# Patient Record
Sex: FEMALE | Race: BLACK OR AFRICAN AMERICAN | NOT HISPANIC OR LATINO | Employment: FULL TIME | URBAN - METROPOLITAN AREA
[De-identification: names, ages, dates, MRNs, and addresses within clinical notes are randomized per-mention and may not be internally consistent; named-entity substitution may affect disease eponyms.]

---

## 2018-01-25 ENCOUNTER — HOSPITAL ENCOUNTER (EMERGENCY)
Facility: HOSPITAL | Age: 38
Discharge: HOME | End: 2018-01-25
Payer: MEDICAID

## 2018-01-25 VITALS
WEIGHT: 280 LBS | OXYGEN SATURATION: 97 % | HEART RATE: 78 BPM | DIASTOLIC BLOOD PRESSURE: 96 MMHG | BODY MASS INDEX: 41.35 KG/M2 | SYSTOLIC BLOOD PRESSURE: 146 MMHG | RESPIRATION RATE: 20 BRPM | TEMPERATURE: 99 F

## 2018-01-25 DIAGNOSIS — M54.9 BACK PAIN: ICD-10-CM

## 2018-01-25 DIAGNOSIS — M54.31 SCIATICA OF RIGHT SIDE: Primary | ICD-10-CM

## 2018-01-25 PROCEDURE — 96372 THER/PROPH/DIAG INJ SC/IM: CPT

## 2018-01-25 PROCEDURE — 63600175 PHARM REV CODE 636 W HCPCS: Performed by: PHYSICIAN ASSISTANT

## 2018-01-25 PROCEDURE — 99284 EMERGENCY DEPT VISIT MOD MDM: CPT | Mod: 25

## 2018-01-25 RX ORDER — METHYLPREDNISOLONE 4 MG/1
TABLET ORAL
Qty: 1 PACKAGE | Refills: 0 | Status: SHIPPED | OUTPATIENT
Start: 2018-01-25 | End: 2018-02-27 | Stop reason: ALTCHOICE

## 2018-01-25 RX ORDER — HYDROCODONE BITARTRATE AND ACETAMINOPHEN 10; 325 MG/1; MG/1
1 TABLET ORAL EVERY 4 HOURS PRN
Qty: 12 TABLET | Refills: 0 | Status: SHIPPED | OUTPATIENT
Start: 2018-01-25 | End: 2018-02-04

## 2018-01-25 RX ORDER — DEXAMETHASONE SODIUM PHOSPHATE 4 MG/ML
8 INJECTION, SOLUTION INTRA-ARTICULAR; INTRALESIONAL; INTRAMUSCULAR; INTRAVENOUS; SOFT TISSUE
Status: COMPLETED | OUTPATIENT
Start: 2018-01-25 | End: 2018-01-25

## 2018-01-25 RX ORDER — METHOCARBAMOL 500 MG/1
1000 TABLET, FILM COATED ORAL 3 TIMES DAILY
Qty: 30 TABLET | Refills: 0 | Status: SHIPPED | OUTPATIENT
Start: 2018-01-25 | End: 2018-01-30

## 2018-01-25 RX ADMIN — DEXAMETHASONE SODIUM PHOSPHATE 8 MG: 4 INJECTION, SOLUTION INTRAMUSCULAR; INTRAVENOUS at 02:01

## 2018-01-25 NOTE — ED PROVIDER NOTES
Encounter Date: 2018       History     Chief Complaint   Patient presents with    Back Pain     lower back pain radiating down right leg after slip and fall last week     The history is provided by the patient.   Back Pain    This is a new problem. The current episode started several days ago. The problem occurs daily. The problem has been unchanged. The pain is associated with falling. The pain is present in the lumbar spine. The quality of the pain is described as shooting. The pain radiates to the right leg. The symptoms are aggravated by twisting, certain positions and bending. Associated symptoms include leg pain. Pertinent negatives include no chest pain, no fever, no numbness, no weight loss, no headaches, no abdominal pain, no abdominal swelling, no bowel incontinence, no perianal numbness, no bladder incontinence, no dysuria, no pelvic pain, no paresthesias, no paresis, no tingling and no weakness. She has tried nothing for the symptoms. The treatment provided no relief.     Review of patient's allergies indicates:  No Known Allergies  Past Medical History:   Diagnosis Date    Cholelithiasis      Past Surgical History:   Procedure Laterality Date     SECTION      COLPOSCOPY       Family History   Problem Relation Age of Onset    Diabetes Mother     Hypertension Mother      Social History   Substance Use Topics    Smoking status: Current Every Day Smoker    Smokeless tobacco: Not on file    Alcohol use 0.0 oz/week     Review of Systems   Constitutional: Negative for chills, fever and weight loss.   HENT: Negative for sore throat.    Eyes: Negative for photophobia and redness.   Respiratory: Negative for cough and shortness of breath.    Cardiovascular: Negative for chest pain.   Gastrointestinal: Negative for abdominal pain, bowel incontinence, diarrhea and nausea.   Endocrine: Negative for polydipsia and polyphagia.   Genitourinary: Negative for bladder incontinence, dysuria and pelvic  pain.   Musculoskeletal: Positive for back pain. Negative for arthralgias and myalgias.   Skin: Negative for rash.   Neurological: Negative for tingling, weakness, numbness, headaches and paresthesias.   Hematological: Does not bruise/bleed easily.   Psychiatric/Behavioral: The patient is not nervous/anxious.    All other systems reviewed and are negative.      Physical Exam     Initial Vitals [01/25/18 1407]   BP Pulse Resp Temp SpO2   (!) 179/95 70 18 98.5 °F (36.9 °C) 97 %      MAP       123         Physical Exam    Nursing note and vitals reviewed.  Constitutional: Vital signs are normal. She appears well-developed and well-nourished. No distress.   HENT:   Head: Normocephalic and atraumatic.   Right Ear: External ear normal.   Left Ear: External ear normal.   Nose: Nose normal.   Mouth/Throat: Oropharynx is clear and moist.   Eyes: Conjunctivae, EOM and lids are normal. Pupils are equal, round, and reactive to light.   Neck: Normal range of motion and full passive range of motion without pain. Neck supple.   Cardiovascular: Normal rate, regular rhythm, S1 normal, S2 normal, normal heart sounds, intact distal pulses and normal pulses.   Pulmonary/Chest: Breath sounds normal. No respiratory distress. She has no wheezes. She has no rales.   Abdominal: Soft. Normal appearance and bowel sounds are normal. She exhibits no distension. There is no tenderness.   Musculoskeletal:        Lumbar back: She exhibits decreased range of motion, tenderness, pain and spasm. She exhibits no bony tenderness, no swelling, no edema, no deformity, no laceration and normal pulse.        Back:    Lymphadenopathy:     She has no cervical adenopathy.   Neurological: She is alert and oriented to person, place, and time. She has normal strength. No cranial nerve deficit or sensory deficit. Coordination and gait normal.   Skin: Skin is warm, dry and intact.   Psychiatric: She has a normal mood and affect. Her speech is normal and behavior  is normal. Judgment and thought content normal. Cognition and memory are normal.         ED Course   Procedures  Labs Reviewed - No data to display                            ED Course      Clinical Impression:   The primary encounter diagnosis was Sciatica of right side. A diagnosis of Back pain was also pertinent to this visit.    Disposition:   Disposition: Discharged  Condition: Stable                        FRANNIE Farias  01/25/18 1717

## 2018-02-05 ENCOUNTER — PATIENT MESSAGE (OUTPATIENT)
Dept: INTERNAL MEDICINE | Facility: CLINIC | Age: 38
End: 2018-02-05

## 2018-02-05 RX ORDER — IBUPROFEN AND FAMOTIDINE 26.6; 8 MG/1; MG/1
2 TABLET ORAL 3 TIMES DAILY
COMMUNITY
End: 2018-02-27 | Stop reason: ALTCHOICE

## 2018-02-06 ENCOUNTER — OFFICE VISIT (OUTPATIENT)
Dept: INTERNAL MEDICINE | Facility: CLINIC | Age: 38
End: 2018-02-06
Payer: MEDICAID

## 2018-02-06 VITALS
OXYGEN SATURATION: 98 % | BODY MASS INDEX: 41.35 KG/M2 | WEIGHT: 280 LBS | SYSTOLIC BLOOD PRESSURE: 140 MMHG | TEMPERATURE: 97 F | HEART RATE: 112 BPM | DIASTOLIC BLOOD PRESSURE: 90 MMHG

## 2018-02-06 DIAGNOSIS — M54.42 ACUTE BILATERAL LOW BACK PAIN WITH LEFT-SIDED SCIATICA: Primary | ICD-10-CM

## 2018-02-06 PROCEDURE — 99999 PR PBB SHADOW E&M-EST. PATIENT-LVL III: CPT | Mod: PBBFAC,,, | Performed by: NURSE PRACTITIONER

## 2018-02-06 PROCEDURE — 99214 OFFICE O/P EST MOD 30 MIN: CPT | Mod: S$PBB,,, | Performed by: NURSE PRACTITIONER

## 2018-02-06 PROCEDURE — 99213 OFFICE O/P EST LOW 20 MIN: CPT | Mod: PBBFAC | Performed by: NURSE PRACTITIONER

## 2018-02-06 PROCEDURE — 3008F BODY MASS INDEX DOCD: CPT | Mod: ,,, | Performed by: NURSE PRACTITIONER

## 2018-02-07 NOTE — PROGRESS NOTES
Subjective:       Patient ID: Karen Rudolph is a 37 y.o. female.    Chief Complaint: Back Pain    Patient presents with back pain that began after she slipped on ice several weeks ago. She has been treated at both [facility] and [facility] for sciatic back pain. She states imaging has been done at both ERs and were negative for acute process.       Back Pain   This is a recurrent problem. The current episode started 1 to 4 weeks ago. The problem occurs constantly. The problem has been waxing and waning since onset. The pain is present in the gluteal, lumbar spine and sacro-iliac. The quality of the pain is described as aching and cramping. The pain radiates to the left foot, left knee and left thigh. The pain is at a severity of 7/10. The pain is moderate. The pain is the same all the time. The symptoms are aggravated by bending, position, lying down, sitting, standing and twisting. Stiffness is present all day. Associated symptoms include leg pain, numbness, tingling and weakness. Pertinent negatives include no abdominal pain, bladder incontinence, bowel incontinence, chest pain, dysuria, fever, headaches, paresis, paresthesias, pelvic pain, perianal numbness or weight loss. Risk factors include obesity and recent trauma. She has tried analgesics, NSAIDs, bed rest, heat, home exercises, muscle relaxant and walking for the symptoms. The treatment provided no relief.     Review of Systems   Constitutional: Negative for chills, diaphoresis, fever, unexpected weight change and weight loss.   Cardiovascular: Negative for chest pain.   Gastrointestinal: Negative for abdominal pain and bowel incontinence.   Genitourinary: Negative for bladder incontinence, dysuria, hematuria and pelvic pain.        Denies urinary incontinence   Musculoskeletal: Positive for back pain.   Neurological: Positive for tingling, weakness and numbness. Negative for headaches and paresthesias.       Objective:      Physical Exam   Constitutional:  She is oriented to person, place, and time. She appears well-developed and well-nourished.   Cardiovascular: Normal rate and regular rhythm.  Exam reveals no friction rub.    No murmur heard.  Pulmonary/Chest: Effort normal and breath sounds normal.   Musculoskeletal:   Lumbar paraspinous muscles tender to palpation.   Negative straight leg bilaterally. Patient unable to lay flat and straighten left leg.    Neurological: She is alert and oriented to person, place, and time.   Psychiatric: She has a normal mood and affect.   Vitals reviewed.      Assessment:       1. Acute bilateral low back pain with left-sided sciatica        Plan:   Acute bilateral low back pain with left-sided sciatica  -     Ambulatory Referral to Physical/Occupational Therapy      Will start with PT and consider further imaging (MRI) if no improvement. Patient wishes to see ortho per last ER recommendation and will contact me with who she wishes to see after she contacts her insurance.   Follow-up in about 2 weeks (around 2/20/2018), or if symptoms worsen or fail to improve, for blood pressure with Dr. Ng.      Answers for HPI/ROS submitted by the patient on 2/5/2018   Back pain  genital pain: No

## 2018-02-13 ENCOUNTER — PATIENT OUTREACH (OUTPATIENT)
Dept: ADMINISTRATIVE | Facility: HOSPITAL | Age: 38
End: 2018-02-13

## 2018-02-27 ENCOUNTER — OFFICE VISIT (OUTPATIENT)
Dept: INTERNAL MEDICINE | Facility: CLINIC | Age: 38
End: 2018-02-27
Payer: MEDICAID

## 2018-02-27 VITALS
HEART RATE: 85 BPM | BODY MASS INDEX: 41.86 KG/M2 | DIASTOLIC BLOOD PRESSURE: 88 MMHG | WEIGHT: 282.63 LBS | SYSTOLIC BLOOD PRESSURE: 138 MMHG | OXYGEN SATURATION: 99 % | RESPIRATION RATE: 18 BRPM | HEIGHT: 69 IN | TEMPERATURE: 98 F

## 2018-02-27 DIAGNOSIS — M79.605 LEFT LEG PAIN: ICD-10-CM

## 2018-02-27 DIAGNOSIS — E66.01 MORBID OBESITY WITH BMI OF 40.0-44.9, ADULT: ICD-10-CM

## 2018-02-27 DIAGNOSIS — R03.0 BORDERLINE HIGH BLOOD PRESSURE: Primary | ICD-10-CM

## 2018-02-27 PROCEDURE — 99213 OFFICE O/P EST LOW 20 MIN: CPT | Mod: S$PBB,,, | Performed by: INTERNAL MEDICINE

## 2018-02-27 PROCEDURE — 99999 PR PBB SHADOW E&M-EST. PATIENT-LVL III: CPT | Mod: PBBFAC,,, | Performed by: INTERNAL MEDICINE

## 2018-02-27 PROCEDURE — 99213 OFFICE O/P EST LOW 20 MIN: CPT | Mod: PBBFAC | Performed by: INTERNAL MEDICINE

## 2018-02-27 NOTE — PATIENT INSTRUCTIONS
Weight Management: Healthy Eating  Food is your bodys fuel. You cant live without it. The key is to give your body enough nutrients and energy without eating too much. Reading food labels can help you make healthy choices. Also, learn new eating habits to manage your weight.     All the values on the label are based on one serving. The serving size is the average portion. Remember to multiply the values on the label by the number of servings you eat.   Eat less fat  A gram of fat has almost 2.5 times the calories of a gram of protein or carbohydrates. Try to balance your food choices so that only 20% to 35% of your calories comes from total fat. This means an average of 2½ to 3½ grams of fat for each 100 calories you eat.  Eat more fiber  High-fiber foods are digested more slowly than low-fiber foods, so you feel full longer. Try to get at least 25 grams of fiber each day for a 2000 calorie diet. Foods high in fiber include:  · Vegetables and fruits  · Whole-grain or bran breads, pastas, and cereals  · Legumes (beans) and peas  As you begin to eat more fiber, be sure to drink plenty of water to keep your digestive system working smoothly.  Tips  Do's and don'ts include:   · Dont skip meals. This often leads to overeating later on. Its best to spread your eating throughout the day.  · Eat a variety of foods, not just a few favorites.  · If you find yourself eating when youre not hungry, ask yourself why. Many of us eat when were bored, stressed, or just to be polite. Listen to your body. If youre not hungry, get busy doing something else instead of eating.  · Eat slower, shooting for 20 to 30 minutes for each meal. It takes 20 minutes for your stomach to tell your brain that its full. Slow eaters tend to eat less and are still satisfied, while fast eaters may tend to be overeaters.   · Pay attention to what you eat. Dont read or watch TV during your meal.  Date Last Reviewed: 1/31/2016  © 4395-5093 [employer]  [employer]. [address] [place] [address], [place] [address]. All rights reserved. This information is not intended as a substitute for professional medical care. Always follow your healthcare professional's instructions.

## 2018-02-28 NOTE — PROGRESS NOTES
Subjective:       Patient ID: Karen Rudolph is a 37 y.o. female.    Chief Complaint: Follow-up    Karen Rudolph  37 y.o.  Black or  female    Patient presents with:  Follow-up    HPI:   Presents to the clinic for two week f/u. She was seen by Tania Clark NP 2 weeks ago for acute back pain and was found to have elevated b/p. She denies a history of HTN. Her b/p is slightly elevated today.   She fell at the end of July. She landed on her buttock. She initially had a lot of numbness and tingling and back pain.   Her back pain has improved but she continues to have pain in her left leg. The pain is worse with standing. She has been placed on leave from work. She will start PT soon.     PMH: Reviewed    MEDS: Reviewed med card    ALLERGIES: Reviewed allergy card        Review of Systems   Constitutional: Negative for activity change and unexpected weight change.   HENT: Negative for hearing loss, rhinorrhea and trouble swallowing.    Eyes: Negative for discharge and visual disturbance.   Respiratory: Negative for chest tightness and wheezing.    Cardiovascular: Negative for chest pain and palpitations.   Gastrointestinal: Negative for blood in stool, constipation, diarrhea and vomiting.   Endocrine: Negative for polydipsia and polyuria.   Genitourinary: Negative for difficulty urinating, dysuria, hematuria and menstrual problem.   Musculoskeletal: Positive for arthralgias. Negative for joint swelling and neck pain.   Neurological: Negative for weakness and headaches.   Psychiatric/Behavioral: Negative for confusion and dysphoric mood.       Objective:      Physical Exam   Constitutional: She is oriented to person, place, and time. She appears well-developed and well-nourished. No distress.   Eyes: No scleral icterus.   Cardiovascular: Normal rate and regular rhythm.    Pulmonary/Chest: Effort normal and breath sounds normal. No respiratory distress.   Abdominal: Soft. Bowel sounds are normal.    Musculoskeletal:        Lumbar back: She exhibits no tenderness, no bony tenderness, no swelling, no deformity and no pain.   Neurological: She is alert and oriented to person, place, and time. She has normal strength. No sensory deficit. She exhibits normal muscle tone. Gait normal.   Skin: Skin is warm and dry.   Psychiatric: She has a normal mood and affect.   Vitals reviewed.      Assessment:       1. Borderline high blood pressure    2. Left leg pain    3. Morbid obesity with BMI of 40.0-44.9, adult        Plan:       Karen was seen today for follow-up.    Diagnoses and all orders for this visit:    Borderline high blood pressure  -     Lifestyle modifications discussed    Left leg pain  -     Agree with PT and time off from work    Morbid obesity with BMI of 40.0-44.9, adult  -     Lifestyle modifications discussed    F/U in 4 weeks for b/p and leg pain.     [place] paperwork will be completed.

## 2018-03-05 ENCOUNTER — CLINICAL SUPPORT (OUTPATIENT)
Dept: REHABILITATION | Facility: HOSPITAL | Age: 38
End: 2018-03-05
Attending: NURSE PRACTITIONER
Payer: MEDICAID

## 2018-03-05 DIAGNOSIS — M54.42 ACUTE BACK PAIN WITH SCIATICA, LEFT: Primary | ICD-10-CM

## 2018-03-05 PROCEDURE — 97161 PT EVAL LOW COMPLEX 20 MIN: CPT | Mod: PO | Performed by: PHYSICAL THERAPIST

## 2018-03-05 NOTE — PROGRESS NOTES
DATE OF INITIAL PHYSICAL THERAPY EVALUATION:             2018      REFERRING PROVIDER:       Tania Clark NP      REFERRING DIAGNOSIS:     Acute bilateral low back pain with left-sided sciatica [M54.42]      ORDERS:   Evaluate and treat as indicated    VISIT    #1       SUBJECTIVE:      Patients primary complaint(s):  Left LE pain  Lumbar stiffness  Impaired ADL's    History of present condition:    Patient reports she was injured in 2018 when she slipped on ice on her driveway and fell landing on her buttocks.  Initially she began to experience lumbar pain with numbness and tingling in the left LE.  Her lumbar pain has lessened and now she reports experiencing mostly lumbar stiffness; however, she continues to experience significant pain in the left LE.  She reports the pain is located throughout the lateral aspect of the left lower leg from just below the knee to her ankle.  She also complains of tenderness along the distribution of the left IT Band.  Patient reports her leg pain is constant and is aggravated by standing, bending, and lifting.  Pain increases at night and when first attempting to move about after sitting for an extended period of time.  Pain eases slightly after moving about for a few minutes; however, extended activity increases symptoms.      Pain Ratin/10      Patient reports the following functional activity limitations:  Most household chores limited; example, laundry, making the bed (bending and lifting impaired)  Prolonged standing and walking impaired  Unable to work due as a     (FUNCTIONAL ASSESSMENT TOOL)    MODIFIED OSWESTRY LOW BACK PAIN DISABILITY QUESTIONNAIRE  Complete during initial evaluation on 2018  The following scores are patient-reported and range from 0-5, with 0 being least impaired and 5 being most impaired.      Section 1- Pain intensity    Score 3/5   Section 2- Person care  Score 3/5   Section 3 Lifting- Optional  Score  3/5     Section 4  Walking  Score 1/5  Section 5 Sitting   Score 1/5   Section 6 Standing  Score 3/5  Section 7 Sleeping  Score 5/5   Section 8 Social Life   Score 4/5  Section 9 Traveling  Score 1/5   Section 10 Employ/home  Score 3/5    Patient reports 54% disability on the Modified Oswestry Low Back Pain Disability Questionnaire.    Past Medical History and co-morbidities:  Past Medical History:   Diagnosis Date    Cholelithiasis      Patient Active Problem List   Diagnosis    Morbid obesity with BMI of 40.0-44.9, adult     No current outpatient prescriptions on file.      Previous physical therapy and treatments:  Patient has not participated in a physical therapy program to date for current referring diagnoses.    Occupational/psychosocial/educational profile:  Not employed at this time as a ; unable to work due to leg pain    OBJECTIVE:    Musculoskeletal Exam:    Postural Exam  No significant postural deviations noted.  SI exam negative for ilial rotation.    ROM  Lumbar Spine:  Flexion:   50% of full expected motion; end point reported to be stiff in the lumbar region, increases left LE pain.     Extension   25% of full expected motion; end point reported to be stiff in the lumbar region; increases left LE pain.   Right side bending  75% of full expected motion   Left side bending  75% of full expected motion      Flexibility  Limited flexibility noted in the HS, IT Band and piriformis bilaterally.    Functional Strength Testing  No strength deficits or imbalances noted throughout the LE or hip girdle muscle groups.    Palpation  Tender along the left IT Band  Tender over left trochanteric bursa  Non tender over painful area in left lower leg region  Lumbar paraspinal muscle guarding present.    Neuromuscular Exam    Gait  Antalgic with weight bearing on the left LE    Transitional Movements  Complains of leg pain with sit to stand, sit to supine, supine to sit.    Sensation  No sensory disturbances  noted throughout the LE's  Patient did not complain of paresthesias.  Positive SLR left LE      PROBLEM LIST - ASSESSMENT  Patient presents with limited lumbar ROM and acute left LE pain which is limiting the patient's mobility and functional activities.  She would benefit from a trial of lumbar traction to address radiating left LE pain and muscle stimulation to decrease lumbar paraspinal muscle guarding.  Patient would also benefit from flexibility exercises for the lumbar, hip girdle, and LE muscle groups as well as core strengthening once she becomes less symptomatic.    Activity Limitations, Participation Restrictions, and CO-MORBIDITIES which may impact the plan of care and potentially impede the patient's progress in therapy include:  none    The patient does not present with any learning or communication barriers which may impact the plan of care and potentially impede the patient's progress in therapy.      CLINICAL PRESENTATION:  Patient's Clinical Presentation is STABLE.      RECOMMENDED PLAN OF CARE:  Out-patient physical therapy program 2 x weed for 6 weeks for the following:  Pelvic traction to address radiating left LE pain.  Muscle stimulation to decrease lumbar paraspinal muscle guarding and stiffness  Lumbar flexibility exercises to improve lumbar ROM  Stretching exercises for the hip girdle and LE muscle groups    TREATMENT PROVIDED:       Patient guided through lumbar flexion stretches in supine.  (5 mins with therapist)      PLAN:    Plan of Care forwarded to the insurance carrier for approval.        SHORT TERM GOALS:    1. Patient will report pain rating for left LE 3/10 or less  2. Instruct patient in lumbar flexibility exercises and stretching for the hip girdle and LE muscle groups  3. Patient to report compliance with daily stretching exercises.    LONG TERM GOALS:  1. Resolution of left LE radiating pain  2. Full, pain free ROM of the lumbar spine  3. Patient will report a 25% or greater  decrease in functional disability on the Oswestry functional assessment tool.      These services are reasonable and necessary for the conditions set forth above while under my care.

## 2018-03-09 ENCOUNTER — CLINICAL SUPPORT (OUTPATIENT)
Dept: REHABILITATION | Facility: HOSPITAL | Age: 38
End: 2018-03-09
Attending: NURSE PRACTITIONER
Payer: MEDICAID

## 2018-03-09 DIAGNOSIS — M54.42 ACUTE BACK PAIN WITH SCIATICA, LEFT: Primary | ICD-10-CM

## 2018-03-09 PROCEDURE — 97012 MECHANICAL TRACTION THERAPY: CPT | Mod: PO | Performed by: PHYSICAL THERAPIST

## 2018-03-09 PROCEDURE — 97014 ELECTRIC STIMULATION THERAPY: CPT | Mod: PO | Performed by: PHYSICAL THERAPIST

## 2018-03-09 NOTE — PROGRESS NOTES
DATE OF INITIAL PHYSICAL THERAPY EVALUATION:             2018      REFERRING PROVIDER:       Tania Clark NP      REFERRING DIAGNOSIS:     Acute bilateral low back pain with left-sided sciatica [M54.42]      ORDERS:   Evaluate and treat as indicated    VISIT    #2      SUBJECTIVE:  Patient comes in today complaining of not feeling well due to severe sinus congestion.       She states she has tried to work on the lumbar flexion exercises a few times but is unsure if they were helpful.  She would like to proceed with traction today.    Patients primary complaint(s):  Left LE pain  Lumbar stiffness  Impaired ADL's    History of present condition:  (from initial evaluation)  Patient reports she was injured in 2018 when she slipped on ice on her driveway and fell landing on her buttocks.  Initially she began to experience lumbar pain with numbness and tingling in the left LE.  Her lumbar pain has lessened and now she reports experiencing mostly lumbar stiffness; however, she continues to experience significant pain in the left LE.  She reports the pain is located throughout the lateral aspect of the left lower leg from just below the knee to her ankle.  She also complains of tenderness along the distribution of the left IT Band.  Patient reports her leg pain is constant and is aggravated by standing, bending, and lifting.  Pain increases at night and when first attempting to move about after sitting for an extended period of time.  Pain eases slightly after moving about for a few minutes; however, extended activity increases symptoms.      Pain Ratin/10      Patient reports the following functional activity limitations:  Most household chores limited; example, laundry, making the bed (bending and lifting impaired)  Prolonged standing and walking impaired  Unable to work due as a     (FUNCTIONAL ASSESSMENT TOOL)    MODIFIED OSWESTRY LOW BACK PAIN DISABILITY QUESTIONNAIRE  Complete  during initial evaluation on March 5, 2018  The following scores are patient-reported and range from 0-5, with 0 being least impaired and 5 being most impaired.      Section 1- Pain intensity    Score 3/5   Section 2- Person care  Score 3/5   Section 3 Lifting- Optional  Score 3/5     Section 4  Walking  Score 1/5  Section 5 Sitting   Score 1/5   Section 6 Standing  Score 3/5  Section 7 Sleeping  Score 5/5   Section 8 Social Life   Score 4/5  Section 9 Traveling  Score 1/5   Section 10 Employ/home  Score 3/5    Patient reports 54% disability on the Modified Oswestry Low Back Pain Disability Questionnaire.    Past Medical History and co-morbidities:  Past Medical History:   Diagnosis Date    Cholelithiasis      Patient Active Problem List   Diagnosis    Morbid obesity with BMI of 40.0-44.9, adult     No current outpatient prescriptions on file.      Previous physical therapy and treatments:  Patient has not participated in a physical therapy program to date for current referring diagnoses.    Occupational/psychosocial/educational profile:  Not employed at this time as a ; unable to work due to leg pain    OBJECTIVE:    Musculoskeletal Exam:  (from initial evaluation)    Postural Exam  No significant postural deviations noted.  SI exam negative for ilial rotation.    ROM  Lumbar Spine:  Flexion:   50% of full expected motion; end point reported to be stiff in the lumbar region, increases left LE pain.     Extension   25% of full expected motion; end point reported to be stiff in the lumbar region; increases left LE pain.   Right side bending  75% of full expected motion   Left side bending  75% of full expected motion      Flexibility  Limited flexibility noted in the HS, IT Band and piriformis bilaterally.    Functional Strength Testing  No strength deficits or imbalances noted throughout the LE or hip girdle muscle groups.    Palpation  Tender along the left IT Band  Tender over left trochanteric bursa  Non  tender over painful area in left lower leg region  Lumbar paraspinal muscle guarding present.    Neuromuscular Exam    Gait  Antalgic with weight bearing on the left LE    Transitional Movements  Complains of leg pain with sit to stand, sit to supine, supine to sit.    Sensation  No sensory disturbances noted throughout the LE's  Patient did not complain of paresthesias.  Positive SLR left LE      PROBLEM LIST - ASSESSMENT  Patient presents with limited lumbar ROM and acute left LE pain which is limiting the patient's mobility and functional activities.  She would benefit from a trial of lumbar traction to address radiating left LE pain and muscle stimulation to decrease lumbar paraspinal muscle guarding.  Patient would also benefit from flexibility exercises for the lumbar, hip girdle, and LE muscle groups as well as core strengthening once she becomes less symptomatic.    Activity Limitations, Participation Restrictions, and CO-MORBIDITIES which may impact the plan of care and potentially impede the patient's progress in therapy include:  none    The patient does not present with any learning or communication barriers which may impact the plan of care and potentially impede the patient's progress in therapy.      CLINICAL PRESENTATION:  Patient's Clinical Presentation is STABLE.      RECOMMENDED PLAN OF CARE:    Pelvic traction to address radiating left LE pain.  Muscle stimulation to decrease lumbar paraspinal muscle guarding and stiffness  Lumbar flexibility exercises to improve lumbar ROM  Stretching exercises for the hip girdle and LE muscle groups    TREATMENT PROVIDED:       -moist heat and muscle stimulation x 20 mins applied to the lumbar paraspinal muscle groups to decrease MFP and lumbar spine stiffness  -static lumbar traction in supine x 10 mins at 70 lbs    Patient reported a slight decrease in radiating left leg pain post traction.     She has been instructed in the following:  -lumbar flexion  stretches in supine    Will expand stretching exercise program when the patient is feeling better.        PLAN:    Patient to return to out-patient physical therapy to continue the recommended plan of care.    SHORT TERM GOALS:    1. Patient will report pain rating for left LE 3/10 or less  2. Instruct patient in lumbar flexibility exercises and stretching for the hip girdle and LE muscle groups  3. Patient to report compliance with daily stretching exercises.    LONG TERM GOALS:  1. Resolution of left LE radiating pain  2. Full, pain free ROM of the lumbar spine  3. Patient will report a 25% or greater decrease in functional disability on the Oswestry functional assessment tool.      These services are reasonable and necessary for the conditions set forth above while under my care.

## 2018-03-13 ENCOUNTER — CLINICAL SUPPORT (OUTPATIENT)
Dept: REHABILITATION | Facility: HOSPITAL | Age: 38
End: 2018-03-13
Attending: NURSE PRACTITIONER
Payer: MEDICAID

## 2018-03-13 DIAGNOSIS — M54.42 ACUTE BACK PAIN WITH SCIATICA, LEFT: Primary | ICD-10-CM

## 2018-03-13 PROCEDURE — 97110 THERAPEUTIC EXERCISES: CPT | Mod: PO | Performed by: PHYSICAL THERAPIST

## 2018-03-13 PROCEDURE — 97012 MECHANICAL TRACTION THERAPY: CPT | Mod: PO | Performed by: PHYSICAL THERAPIST

## 2018-03-13 PROCEDURE — 97014 ELECTRIC STIMULATION THERAPY: CPT | Mod: PO | Performed by: PHYSICAL THERAPIST

## 2018-03-13 NOTE — PROGRESS NOTES
DATE OF INITIAL PHYSICAL THERAPY EVALUATION:             2018      REFERRING PROVIDER:       Tania Clark NP      REFERRING DIAGNOSIS:     Acute bilateral low back pain with left-sided sciatica [M54.42]      ORDERS:   Evaluate and treat as indicated    VISIT    #3      SUBJECTIVE:  Patient states she is still not feeling well due to severe sinus congestion.   She reports the traction seemed to provide temporary relief during her previous visit.    She states she has continued to try to work on the lumbar flexion exercises.    She would like to proceed with traction today and is receptive to additional exercise instruction.    Patients primary complaint(s):  Left LE pain  Lumbar stiffness  Impaired ADL's    History of present condition:  (from initial evaluation)  Patient reports she was injured in 2018 when she slipped on ice on her driveway and fell landing on her buttocks.  Initially she began to experience lumbar pain with numbness and tingling in the left LE.  Her lumbar pain has lessened and now she reports experiencing mostly lumbar stiffness; however, she continues to experience significant pain in the left LE.  She reports the pain is located throughout the lateral aspect of the left lower leg from just below the knee to her ankle.  She also complains of tenderness along the distribution of the left IT Band.  Patient reports her leg pain is constant and is aggravated by standing, bending, and lifting.  Pain increases at night and when first attempting to move about after sitting for an extended period of time.  Pain eases slightly after moving about for a few minutes; however, extended activity increases symptoms.      Pain Ratin/10      Patient reports the following functional activity limitations:  Most household chores limited; example, laundry, making the bed (bending and lifting impaired)  Prolonged standing and walking impaired  Unable to work due as a  yoni    (FUNCTIONAL ASSESSMENT TOOL)    MODIFIED OSWESTRY LOW BACK PAIN DISABILITY QUESTIONNAIRE  Complete during initial evaluation on March 5, 2018  The following scores are patient-reported and range from 0-5, with 0 being least impaired and 5 being most impaired.      Section 1- Pain intensity    Score 3/5   Section 2- Person care  Score 3/5   Section 3 Lifting- Optional  Score 3/5     Section 4  Walking  Score 1/5  Section 5 Sitting   Score 1/5   Section 6 Standing  Score 3/5  Section 7 Sleeping  Score 5/5   Section 8 Social Life   Score 4/5  Section 9 Traveling  Score 1/5   Section 10 Employ/home  Score 3/5    Patient reports 54% disability on the Modified Oswestry Low Back Pain Disability Questionnaire.    Past Medical History and co-morbidities:  Past Medical History:   Diagnosis Date    Cholelithiasis      Patient Active Problem List   Diagnosis    Morbid obesity with BMI of 40.0-44.9, adult     No current outpatient prescriptions on file.      Previous physical therapy and treatments:  Patient has not participated in a physical therapy program to date for current referring diagnoses.    Occupational/psychosocial/educational profile:  Not employed at this time as a ; unable to work due to leg pain    OBJECTIVE:    Musculoskeletal Exam:  (from initial evaluation)    Postural Exam  No significant postural deviations noted.  SI exam negative for ilial rotation.    ROM  Lumbar Spine:  Flexion:   50% of full expected motion; end point reported to be stiff in the lumbar region, increases left LE pain.     Extension   25% of full expected motion; end point reported to be stiff in the lumbar region; increases left LE pain.   Right side bending  75% of full expected motion   Left side bending  75% of full expected motion      Flexibility  Limited flexibility noted in the HS, IT Band and piriformis bilaterally.    Functional Strength Testing  No strength deficits or imbalances noted throughout the LE or  hip girdle muscle groups.    Palpation  Tender along the left IT Band  Tender over left trochanteric bursa  Non tender over painful area in left lower leg region  Lumbar paraspinal muscle guarding present.    Neuromuscular Exam    Gait  Antalgic with weight bearing on the left LE    Transitional Movements  Complains of leg pain with sit to stand, sit to supine, supine to sit.    Sensation  No sensory disturbances noted throughout the LE's  Patient did not complain of paresthesias.  Positive SLR left LE      PROBLEM LIST - ASSESSMENT  Patient presents with limited lumbar ROM and acute left LE pain which is limiting the patient's mobility and functional activities.  She would benefit from a trial of lumbar traction to address radiating left LE pain and muscle stimulation to decrease lumbar paraspinal muscle guarding.  Patient would also benefit from flexibility exercises for the lumbar, hip girdle, and LE muscle groups as well as core strengthening once she becomes less symptomatic.    Activity Limitations, Participation Restrictions, and CO-MORBIDITIES which may impact the plan of care and potentially impede the patient's progress in therapy include:  none    The patient does not present with any learning or communication barriers which may impact the plan of care and potentially impede the patient's progress in therapy.      CLINICAL PRESENTATION:  Patient's Clinical Presentation is STABLE.      RECOMMENDED PLAN OF CARE:    Pelvic traction to address radiating left LE pain.  Muscle stimulation to decrease lumbar paraspinal muscle guarding and stiffness  Lumbar flexibility exercises to improve lumbar ROM  Stretching exercises for the hip girdle and LE muscle groups    TREATMENT PROVIDED:       -moist heat and muscle stimulation x 20 mins applied to the lumbar paraspinal muscle groups to decrease MFP and lumbar spine stiffness  -static lumbar traction in supine x 20 mins at 90 lbs    Patient reported a decrease in  radiating left leg pain post traction.     Patient was guided through the following exercises today:  (one on one with therapist 10 mins)  -Lumbar rotational ROM in supine  -Lumbar rotational stretches in supine  -Lumbar flexion stretches in supine  -hamstring stretching  -piriformis stretching  -IT Band stretching  -gluteus medius stretching  -stretching of the hip internal rotators    Will instruct patient in the following as tolerated:  -Level one trunk stabilization  ---abdominal crunches; neutral position  ---abdominal crunches; diagonal patterns  ---bridging  ---bridging with hip flexion lifts    PLAN:    Patient to return to out-patient physical therapy to continue the recommended plan of care.    SHORT TERM GOALS:    1. Patient will report pain rating for left LE 3/10 or less  2. Instruct patient in lumbar flexibility exercises and stretching for the hip girdle and LE muscle groups  3. Patient to report compliance with daily stretching exercises.    LONG TERM GOALS:  1. Resolution of left LE radiating pain  2. Full, pain free ROM of the lumbar spine  3. Patient will report a 25% or greater decrease in functional disability on the Oswestry functional assessment tool.      These services are reasonable and necessary for the conditions set forth above while under my care.

## 2018-03-15 ENCOUNTER — CLINICAL SUPPORT (OUTPATIENT)
Dept: REHABILITATION | Facility: HOSPITAL | Age: 38
End: 2018-03-15
Attending: NURSE PRACTITIONER
Payer: MEDICAID

## 2018-03-15 DIAGNOSIS — M54.42 ACUTE BACK PAIN WITH SCIATICA, LEFT: Primary | ICD-10-CM

## 2018-03-15 PROCEDURE — 97014 ELECTRIC STIMULATION THERAPY: CPT | Mod: PO | Performed by: PHYSICAL THERAPIST

## 2018-03-15 PROCEDURE — 97012 MECHANICAL TRACTION THERAPY: CPT | Mod: PO | Performed by: PHYSICAL THERAPIST

## 2018-03-15 NOTE — PROGRESS NOTES
DATE OF INITIAL PHYSICAL THERAPY EVALUATION:             2018      REFERRING PROVIDER:       Tania Clark NP      REFERRING DIAGNOSIS:     Acute bilateral low back pain with left-sided sciatica [M54.42]      ORDERS:   Evaluate and treat as indicated    VISIT    #3      SUBJECTIVE:  Patient states she experienced relief of her leg pain for two days following her last treatment of traction.  She reports, however, she cleaned her house yesterday and is now experiencing an increase in leg and back pain.    She states she worked through the exercises yesterday morning and tolerated them well.  She has not tried the exercises again since becoming more symptomatic.      Patients primary complaint(s):  Left LE pain  Lumbar stiffness  Impaired ADL's    History of present condition:  (from initial evaluation)  Patient reports she was injured in 2018 when she slipped on ice on her driveway and fell landing on her buttocks.  Initially she began to experience lumbar pain with numbness and tingling in the left LE.  Her lumbar pain has lessened and now she reports experiencing mostly lumbar stiffness; however, she continues to experience significant pain in the left LE.  She reports the pain is located throughout the lateral aspect of the left lower leg from just below the knee to her ankle.  She also complains of tenderness along the distribution of the left IT Band.  Patient reports her leg pain is constant and is aggravated by standing, bending, and lifting.  Pain increases at night and when first attempting to move about after sitting for an extended period of time.  Pain eases slightly after moving about for a few minutes; however, extended activity increases symptoms.      Pain Ratin/10      Patient reports the following functional activity limitations:  Most household chores limited; example, laundry, making the bed (bending and lifting impaired)  Prolonged standing and walking  impaired  Unable to work due as a     (FUNCTIONAL ASSESSMENT TOOL)    MODIFIED OSWESTRY LOW BACK PAIN DISABILITY QUESTIONNAIRE  Complete during initial evaluation on March 5, 2018  The following scores are patient-reported and range from 0-5, with 0 being least impaired and 5 being most impaired.      Section 1- Pain intensity    Score 3/5   Section 2- Person care  Score 3/5   Section 3 Lifting- Optional  Score 3/5     Section 4  Walking  Score 1/5  Section 5 Sitting   Score 1/5   Section 6 Standing  Score 3/5  Section 7 Sleeping  Score 5/5   Section 8 Social Life   Score 4/5  Section 9 Traveling  Score 1/5   Section 10 Employ/home  Score 3/5    Patient reports 54% disability on the Modified Oswestry Low Back Pain Disability Questionnaire.    Past Medical History and co-morbidities:  Past Medical History:   Diagnosis Date    Cholelithiasis      Patient Active Problem List   Diagnosis    Morbid obesity with BMI of 40.0-44.9, adult     No current outpatient prescriptions on file.      Previous physical therapy and treatments:  Patient has not participated in a prior physical therapy program to date for current referring diagnoses.    Occupational/psychosocial/educational profile:  Not employed at this time as a ; unable to work due to leg pain    OBJECTIVE:    Musculoskeletal Exam:  (from initial evaluation)    Postural Exam  No significant postural deviations noted.  SI exam negative for ilial rotation.    ROM  Lumbar Spine:  Flexion:   50% of full expected motion; end point reported to be stiff in the lumbar region, increases left LE pain.     Extension   25% of full expected motion; end point reported to be stiff in the lumbar region; increases left LE pain.   Right side bending  75% of full expected motion   Left side bending  75% of full expected motion      Flexibility  Limited flexibility noted in the HS, IT Band and piriformis bilaterally.    Functional Strength Testing  No strength deficits or  imbalances noted throughout the LE or hip girdle muscle groups.    Palpation  Tender along the left IT Band  Tender over left trochanteric bursa  Non tender over painful area in left lower leg region  Lumbar paraspinal muscle guarding present.    Neuromuscular Exam    Gait  Antalgic with weight bearing on the left LE    Transitional Movements  Complains of leg pain with sit to stand, sit to supine, supine to sit.    Sensation  No sensory disturbances noted throughout the LE's  Patient did not complain of paresthesias.  Positive SLR left LE      PROBLEM LIST - ASSESSMENT  Patient presents with limited lumbar ROM and acute left LE pain which is limiting the patient's mobility and functional activities.  She would benefit from a trial of lumbar traction to address radiating left LE pain and muscle stimulation to decrease lumbar paraspinal muscle guarding.  Patient is benefiting from flexibility exercises for the lumbar, hip girdle, and LE muscle groups.  Will proceed with core strengthening once she becomes less symptomatic.    Activity Limitations, Participation Restrictions, and CO-MORBIDITIES which may impact the plan of care and potentially impede the patient's progress in therapy include:  none    The patient does not present with any learning or communication barriers which may impact the plan of care and potentially impede the patient's progress in therapy.      CLINICAL PRESENTATION:  Patient's Clinical Presentation is STABLE.      RECOMMENDED PLAN OF CARE:    Pelvic traction to address radiating left LE pain.  Muscle stimulation to decrease lumbar paraspinal muscle guarding and stiffness  Lumbar flexibility exercises to improve lumbar ROM  Stretching exercises for the hip girdle and LE muscle groups    TREATMENT PROVIDED:       -moist heat and muscle stimulation x 20 mins applied to the lumbar paraspinal muscle groups to decrease MFP and lumbar spine stiffness  -static lumbar traction in supine x 20 mins at 90  lbs    Patient reported a decrease in radiating left leg pain following traction today.       Patient has been instructed in the following exercises.  -Lumbar rotational ROM in supine  -Lumbar rotational stretches in supine  -Lumbar flexion stretches in supine  -hamstring stretching  -piriformis stretching  -IT Band stretching  -gluteus medius stretching  -stretching of the hip internal rotators    Will instruct patient in the following as tolerated:  -Level one trunk stabilization  ---abdominal crunches; neutral position  ---abdominal crunches; diagonal patterns  ---bridging  ---bridging with hip flexion lifts    PLAN:    Patient to return to out-patient physical therapy to continue the recommended plan of care.    SHORT TERM GOALS:    1. Patient will report pain rating for left LE 3/10 or less  2. Instruct patient in lumbar flexibility exercises and stretching for the hip girdle and LE muscle groups  3. Patient to report compliance with daily stretching exercises.    LONG TERM GOALS:  1. Resolution of left LE radiating pain  2. Full, pain free ROM of the lumbar spine  3. Patient will report a 25% or greater decrease in functional disability on the Oswestry functional assessment tool.      These services are reasonable and necessary for the conditions set forth above while under my care.

## 2018-03-19 ENCOUNTER — CLINICAL SUPPORT (OUTPATIENT)
Dept: REHABILITATION | Facility: HOSPITAL | Age: 38
End: 2018-03-19
Attending: NURSE PRACTITIONER
Payer: MEDICAID

## 2018-03-19 DIAGNOSIS — M54.42 ACUTE BACK PAIN WITH SCIATICA, LEFT: Primary | ICD-10-CM

## 2018-03-19 PROCEDURE — 97014 ELECTRIC STIMULATION THERAPY: CPT | Mod: PO | Performed by: PHYSICAL THERAPIST

## 2018-03-19 PROCEDURE — 97110 THERAPEUTIC EXERCISES: CPT | Mod: PO | Performed by: PHYSICAL THERAPIST

## 2018-03-19 PROCEDURE — 97012 MECHANICAL TRACTION THERAPY: CPT | Mod: PO | Performed by: PHYSICAL THERAPIST

## 2018-03-19 NOTE — PROGRESS NOTES
DATE OF INITIAL PHYSICAL THERAPY EVALUATION:             2018      REFERRING PROVIDER:       Tania Clark NP      REFERRING DIAGNOSIS:     Acute bilateral low back pain with left-sided sciatica [M54.42]      ORDERS:   Evaluate and treat as indicated    VISIT    #4      SUBJECTIVE:  Patient states she experiences a slight decrease in radiating leg pain immediately following traction; however, her pain returns as soon as she begins to walk or engage in any type activity.    Patient is also complaining of myofascial pain in the right lumbar paraspinal and SI joint region.    She states she is working on the stretching exercises daily.       Patients primary complaint(s):  Left LE pain  Lumbar stiffness  Impaired ADL's    History of present condition:  (from initial evaluation)  Patient reports she was injured in 2018 when she slipped on ice on her driveway and fell landing on her buttocks.  Initially she began to experience lumbar pain with numbness and tingling in the left LE.  Her lumbar pain has lessened and now she reports experiencing mostly lumbar stiffness; however, she continues to experience significant pain in the left LE.  She reports the pain is located throughout the lateral aspect of the left lower leg from just below the knee to her ankle.  She also complains of tenderness along the distribution of the left IT Band.  Patient reports her leg pain is constant and is aggravated by standing, bending, and lifting.  Pain increases at night and when first attempting to move about after sitting for an extended period of time.  Pain eases slightly after moving about for a few minutes; however, extended activity increases symptoms.      Pain Ratin/10         4/10 post traction      Patient reports the following functional activity limitations:  Most household chores limited; example, laundry, making the bed (bending and lifting impaired)  Prolonged standing and walking  impaired  Unable to work due as a     (FUNCTIONAL ASSESSMENT TOOL)    MODIFIED OSWESTRY LOW BACK PAIN DISABILITY QUESTIONNAIRE  Complete during initial evaluation on March 5, 2018  The following scores are patient-reported and range from 0-5, with 0 being least impaired and 5 being most impaired.      Section 1- Pain intensity    Score 3/5   Section 2- Person care  Score 3/5   Section 3 Lifting- Optional  Score 3/5     Section 4  Walking  Score 1/5  Section 5 Sitting   Score 1/5   Section 6 Standing  Score 3/5  Section 7 Sleeping  Score 5/5   Section 8 Social Life   Score 4/5  Section 9 Traveling  Score 1/5   Section 10 Employ/home  Score 3/5    Patient reports 54% disability on the Modified Oswestry Low Back Pain Disability Questionnaire.    Past Medical History and co-morbidities:  Past Medical History:   Diagnosis Date    Cholelithiasis      Patient Active Problem List   Diagnosis    Morbid obesity with BMI of 40.0-44.9, adult     No current outpatient prescriptions on file.      Previous physical therapy and treatments:  Patient has not participated in a prior physical therapy program to date for current referring diagnoses.    Occupational/psychosocial/educational profile:  Not employed at this time as a ; unable to work due to leg pain    OBJECTIVE:    Musculoskeletal Exam:  (from initial evaluation)    Postural Exam  No significant postural deviations noted.  SI exam negative for ilial rotation.    ROM  Lumbar Spine:  Flexion:   50% of full expected motion; end point reported to be stiff in the lumbar region, increases left LE pain.     Extension   25% of full expected motion; end point reported to be stiff in the lumbar region; increases left LE pain.   Right side bending  75% of full expected motion   Left side bending  75% of full expected motion      Flexibility  Limited flexibility noted in the HS, IT Band and piriformis bilaterally.    Functional Strength Testing  No strength deficits or  imbalances noted throughout the LE or hip girdle muscle groups.    Palpation  Tender along the left IT Band  Tender over left trochanteric bursa  Non tender over painful area in left lower leg region  Lumbar paraspinal muscle guarding present.    Neuromuscular Exam    Gait  Antalgic with weight bearing on the left LE    Transitional Movements  Complains of leg pain with sit to stand, sit to supine, supine to sit.    Sensation  No sensory disturbances noted throughout the LE's  Patient did not complain of paresthesias.  Positive SLR left LE      PROBLEM LIST - ASSESSMENT  Patient presents with limited lumbar ROM and acute left LE pain which is limiting the patient's mobility and functional activities.  She would benefit from a trial of lumbar traction to address radiating left LE pain and muscle stimulation to decrease lumbar paraspinal muscle guarding.  Patient is benefiting from flexibility exercises for the lumbar, hip girdle, and LE muscle groups.  Will proceed with core strengthening once she becomes less symptomatic.    Activity Limitations, Participation Restrictions, and CO-MORBIDITIES which may impact the plan of care and potentially impede the patient's progress in therapy include:  none    The patient does not present with any learning or communication barriers which may impact the plan of care and potentially impede the patient's progress in therapy.      CLINICAL PRESENTATION:  Patient's Clinical Presentation is STABLE.      RECOMMENDED PLAN OF CARE:    Pelvic traction to address radiating left LE pain.  Muscle stimulation to decrease lumbar paraspinal muscle guarding and stiffness  Lumbar flexibility exercises to improve lumbar ROM  Stretching exercises for the hip girdle and LE muscle groups    TREATMENT PROVIDED:       -moist heat and muscle stimulation x 20 mins applied to the lumbar paraspinal muscle groups to decrease MFP and lumbar spine stiffness  -static lumbar traction in supine x 20 mins at 90  lbs  -passive stretching provided by the therapist (one on one 15 mins)  -Lumbar rotational stretches in supine  -Lumbar flexion stretches in supine  -hamstring stretching  -piriformis stretching  -IT Band stretching  -gluteus medius stretching  -stretching of the hip internal rotators      Patient has been instructed in the following exercises.  -Lumbar rotational ROM in supine  -Lumbar rotational stretches in supine  -Lumbar flexion stretches in supine  -hamstring stretching  -piriformis stretching  -IT Band stretching  -gluteus medius stretching  -stretching of the hip internal rotators    Will instruct patient in the following as tolerated:  -Level one trunk stabilization  ---abdominal crunches; neutral position  ---abdominal crunches; diagonal patterns  ---bridging  ---bridging with hip flexion lifts    PLAN:    Patient to return to out-patient physical therapy to continue the recommended plan of care.    SHORT TERM GOALS:    1. Patient will report pain rating for left LE 3/10 or less  2. Instruct patient in lumbar flexibility exercises and stretching for the hip girdle and LE muscle groups  3. Patient to report compliance with daily stretching exercises.    LONG TERM GOALS:  1. Resolution of left LE radiating pain  2. Full, pain free ROM of the lumbar spine  3. Patient will report a 25% or greater decrease in functional disability on the Oswestry functional assessment tool.      These services are reasonable and necessary for the conditions set forth above while under my care.

## 2018-03-22 ENCOUNTER — CLINICAL SUPPORT (OUTPATIENT)
Dept: REHABILITATION | Facility: HOSPITAL | Age: 38
End: 2018-03-22
Attending: NURSE PRACTITIONER
Payer: MEDICAID

## 2018-03-22 DIAGNOSIS — M54.42 ACUTE BACK PAIN WITH SCIATICA, LEFT: Primary | ICD-10-CM

## 2018-03-22 PROCEDURE — 97110 THERAPEUTIC EXERCISES: CPT | Mod: PO | Performed by: PHYSICAL THERAPIST

## 2018-03-22 PROCEDURE — 97014 ELECTRIC STIMULATION THERAPY: CPT | Mod: PO | Performed by: PHYSICAL THERAPIST

## 2018-03-22 PROCEDURE — 97012 MECHANICAL TRACTION THERAPY: CPT | Mod: PO | Performed by: PHYSICAL THERAPIST

## 2018-03-22 NOTE — PROGRESS NOTES
DATE OF INITIAL PHYSICAL THERAPY EVALUATION:             2018      REFERRING PROVIDER:       Tania Clark NP      REFERRING DIAGNOSIS:     Acute bilateral low back pain with left-sided sciatica [M54.42]      ORDERS:   Evaluate and treat as indicated    VISIT    #5      SUBJECTIVE:  Patient states the decrease in radiating leg pain experienced immediately following traction continues to be temporary.    Patient is complaining of myofascial pain in the right lumbar paraspinal and SI joint region.    She states she is working on the stretching exercises daily.       Patients primary complaint(s):  Left LE pain  Lumbar stiffness  Impaired ADL's    History of present condition:  (from initial evaluation)  Patient reports she was injured in 2018 when she slipped on ice on her driveway and fell landing on her buttocks.  Initially she began to experience lumbar pain with numbness and tingling in the left LE.  Her lumbar pain has lessened and now she reports experiencing mostly lumbar stiffness; however, she continues to experience significant pain in the left LE.  She reports the pain is located throughout the lateral aspect of the left lower leg from just below the knee to her ankle.  She also complains of tenderness along the distribution of the left IT Band.  Patient reports her leg pain is constant and is aggravated by standing, bending, and lifting.  Pain increases at night and when first attempting to move about after sitting for an extended period of time.  Pain eases slightly after moving about for a few minutes; however, extended activity increases symptoms.      Pain Ratin/10         4/10 post traction      Patient reports the following functional activity limitations:  Most household chores limited; example, laundry, making the bed (bending and lifting impaired)  Prolonged standing and walking impaired  Unable to work due as a     (FUNCTIONAL ASSESSMENT TOOL)    MODIFIED  OSWESTRY LOW BACK PAIN DISABILITY QUESTIONNAIRE  Complete during initial evaluation on March 5, 2018  The following scores are patient-reported and range from 0-5, with 0 being least impaired and 5 being most impaired.      Section 1- Pain intensity    Score 3/5   Section 2- Person care  Score 3/5   Section 3 Lifting- Optional  Score 3/5     Section 4  Walking  Score 1/5  Section 5 Sitting   Score 1/5   Section 6 Standing  Score 3/5  Section 7 Sleeping  Score 5/5   Section 8 Social Life   Score 4/5  Section 9 Traveling  Score 1/5   Section 10 Employ/home  Score 3/5    Patient reports 54% disability on the Modified Oswestry Low Back Pain Disability Questionnaire.    Past Medical History and co-morbidities:  Past Medical History:   Diagnosis Date    Cholelithiasis      Patient Active Problem List   Diagnosis    Morbid obesity with BMI of 40.0-44.9, adult     No current outpatient prescriptions on file.      Previous physical therapy and treatments:  Patient has not participated in a prior physical therapy program to date for current referring diagnoses.    Occupational/psychosocial/educational profile:  Not employed at this time as a ; unable to work due to leg pain    OBJECTIVE:    Musculoskeletal Exam:  (from initial evaluation)    Postural Exam  No significant postural deviations noted.  SI exam negative for ilial rotation.    ROM  Lumbar Spine:  Flexion:   50% of full expected motion; end point reported to be stiff in the lumbar region, increases left LE pain.     Extension   25% of full expected motion; end point reported to be stiff in the lumbar region; increases left LE pain.   Right side bending  75% of full expected motion   Left side bending  75% of full expected motion      Flexibility  Limited flexibility noted in the HS, IT Band and piriformis bilaterally.    Functional Strength Testing  No strength deficits or imbalances noted throughout the LE or hip girdle muscle groups.    Palpation  Tender  along the left IT Band  Tender over left trochanteric bursa  Non tender over painful area in left lower leg region  Lumbar paraspinal muscle guarding present.    Neuromuscular Exam    Gait  Antalgic with weight bearing on the left LE    Transitional Movements  Complains of leg pain with sit to stand, sit to supine, supine to sit.    Sensation  No sensory disturbances noted throughout the LE's  Patient did not complain of paresthesias.  Positive SLR left LE      PROBLEM LIST - ASSESSMENT  Patient presents with limited lumbar ROM and acute left LE pain which is limiting the patient's mobility and functional activities.  She would benefit from a trial of lumbar traction to address radiating left LE pain and muscle stimulation to decrease lumbar paraspinal muscle guarding.  Patient is benefiting from flexibility exercises for the lumbar, hip girdle, and LE muscle groups.  Will proceed with core strengthening once she becomes less symptomatic.    Activity Limitations, Participation Restrictions, and CO-MORBIDITIES which may impact the plan of care and potentially impede the patient's progress in therapy include:  none    The patient does not present with any learning or communication barriers which may impact the plan of care and potentially impede the patient's progress in therapy.      CLINICAL PRESENTATION:  Patient's Clinical Presentation is STABLE.      RECOMMENDED PLAN OF CARE:    Pelvic traction to address radiating left LE pain.  Muscle stimulation to decrease lumbar paraspinal muscle guarding and stiffness  Lumbar flexibility exercises to improve lumbar ROM  Stretching exercises for the hip girdle and LE muscle groups    TREATMENT PROVIDED:       -moist heat and muscle stimulation x 20 mins applied to the lumbar paraspinal muscle groups to decrease MFP and lumbar spine stiffness  -static lumbar traction in supine x 20 mins at 90 lbs  -passive stretching provided by the therapist (one on one 15 mins)  -Lumbar  rotational stretches in supine  -Lumbar flexion stretches in supine  -hamstring stretching  -piriformis stretching  -IT Band stretching  -gluteus medius stretching  -stretching of the hip internal rotators      Patient has been instructed in the following exercises.  -Lumbar rotational ROM in supine  -Lumbar rotational stretches in supine  -Lumbar flexion stretches in supine  -hamstring stretching  -piriformis stretching  -IT Band stretching  -gluteus medius stretching  -stretching of the hip internal rotators    Will instruct patient in the following as tolerated:  -Level one trunk stabilization  ---abdominal crunches; neutral position  ---abdominal crunches; diagonal patterns  ---bridging  ---bridging with hip flexion lifts    PLAN:    Patient to return to out-patient physical therapy to continue the recommended plan of care.    SHORT TERM GOALS:    1. Patient will report pain rating for left LE 3/10 or less  2. Instruct patient in lumbar flexibility exercises and stretching for the hip girdle and LE muscle groups  3. Patient to report compliance with daily stretching exercises.    LONG TERM GOALS:  1. Resolution of left LE radiating pain  2. Full, pain free ROM of the lumbar spine  3. Patient will report a 25% or greater decrease in functional disability on the Oswestry functional assessment tool.      These services are reasonable and necessary for the conditions set forth above while under my care.

## 2018-03-27 ENCOUNTER — OFFICE VISIT (OUTPATIENT)
Dept: INTERNAL MEDICINE | Facility: CLINIC | Age: 38
End: 2018-03-27
Payer: MEDICAID

## 2018-03-27 VITALS
HEIGHT: 69 IN | OXYGEN SATURATION: 99 % | DIASTOLIC BLOOD PRESSURE: 88 MMHG | BODY MASS INDEX: 41.83 KG/M2 | TEMPERATURE: 98 F | WEIGHT: 282.44 LBS | HEART RATE: 85 BPM | SYSTOLIC BLOOD PRESSURE: 118 MMHG

## 2018-03-27 DIAGNOSIS — M79.605 LEFT LEG PAIN: Primary | ICD-10-CM

## 2018-03-27 DIAGNOSIS — R03.0 ELEVATED BLOOD PRESSURE READING WITHOUT DIAGNOSIS OF HYPERTENSION: ICD-10-CM

## 2018-03-27 DIAGNOSIS — R51.9 RECURRENT HEADACHE: ICD-10-CM

## 2018-03-27 PROCEDURE — 99999 PR PBB SHADOW E&M-EST. PATIENT-LVL III: CPT | Mod: PBBFAC,,, | Performed by: INTERNAL MEDICINE

## 2018-03-27 PROCEDURE — 99214 OFFICE O/P EST MOD 30 MIN: CPT | Mod: S$PBB,,, | Performed by: INTERNAL MEDICINE

## 2018-03-27 PROCEDURE — 99213 OFFICE O/P EST LOW 20 MIN: CPT | Mod: PBBFAC | Performed by: INTERNAL MEDICINE

## 2018-03-27 RX ORDER — TIZANIDINE 4 MG/1
4 TABLET ORAL EVERY 12 HOURS PRN
Qty: 30 TABLET | Refills: 0 | Status: SHIPPED | OUTPATIENT
Start: 2018-03-27 | End: 2019-07-12

## 2018-03-27 NOTE — PROGRESS NOTES
Subjective:       Patient ID: Karen Rudolph is a 37 y.o. female.    Chief Complaint: Leg Pain    Karen Rudolph  37 y.o.  Black or  female    Patient presents with:  Leg Pain      HPI:  Presents to the clinic for a 4 week f/u on left leg pain. She is currently in physical therapy but does not feel it is helping. She is no longer taking prescribed medication.   She states the pain in her back occurs on occasion but the pain in her left leg is constant. She denies weakness, numbness or tingling in her leg. She denies hip or knee pain. She describes it as dull and states it feels like muscle spasms.   She is also concerned about her b/p fluctuating. She only checks it when she has a headache and states it is high. This occurs approximately 2-3 times per week.   She does not have a history of HTN.      PMH: Reviewed    MEDS: Reviewed med card    ALLERGIES: Reviewed allergy card        Review of Systems   Constitutional: Positive for activity change. Negative for unexpected weight change.   HENT: Negative for hearing loss, rhinorrhea and trouble swallowing.    Eyes: Negative for discharge and visual disturbance.   Respiratory: Negative for chest tightness and wheezing.    Cardiovascular: Negative for chest pain and palpitations.   Gastrointestinal: Negative for blood in stool, constipation, diarrhea and vomiting.   Endocrine: Negative for polydipsia and polyuria.   Genitourinary: Negative for difficulty urinating, dysuria, hematuria and menstrual problem.   Musculoskeletal: Positive for arthralgias. Negative for gait problem, joint swelling and neck pain.   Neurological: Positive for headaches. Negative for weakness and numbness.   Psychiatric/Behavioral: Negative for confusion and dysphoric mood.       Objective:      Physical Exam   Constitutional: She is oriented to person, place, and time. She appears well-developed and well-nourished. No distress.   Eyes: No scleral icterus.   Cardiovascular:  Normal rate.    Pulmonary/Chest: Effort normal. No respiratory distress.   Musculoskeletal:        Lumbar back: She exhibits no tenderness, no bony tenderness, no swelling, no edema, no deformity, no laceration, no pain and no spasm.        Left upper leg: She exhibits tenderness (lateral leg--mild). She exhibits no bony tenderness, no swelling and no edema.   Neurological: She is alert and oriented to person, place, and time. She has normal strength. No sensory deficit. She exhibits normal muscle tone. Gait normal.   Skin: Skin is warm and dry.   Psychiatric: She has a normal mood and affect.   Vitals reviewed.      Assessment:       1. Left leg pain        Plan:       Karen was seen today for leg pain.    Diagnoses and all orders for this visit:    Left leg pain  -     Ambulatory consult to Physiatry  -     tiZANidine (ZANAFLEX) 4 MG tablet; Take 1 tablet (4 mg total) by mouth every 12 (twelve) hours as needed.    Elevated blood pressure reading without diagnosis of hypertension  -     Recommend home b/p monitoring daily over the next 2 weeks    Recurrent headache  -     Recommend keeping a diary    Advised her to notify me of b/p readings in the next 2 weeks.

## 2018-03-27 NOTE — PATIENT INSTRUCTIONS
Tizanidine tablets or capsules  What is this medicine?  TIZANIDINE (lu oro) helps to relieve muscle spasms. It may be used to help in the treatment of multiple sclerosis and spinal cord injury.  How should I use this medicine?  Take this medicine by mouth with a full glass of water. Take this medicine on an empty stomach, at least 30 minutes before or 2 hours after food. Do not take with food unless you talk with your doctor. Follow the directions on the prescription label. Take your medicine at regular intervals. Do not take your medicine more often than directed. Do not stop taking except on your doctor's advice. Suddenly stopping the medicine can be very dangerous.  Talk to your pediatrician regarding the use of this medicine in children.  Patients over 65 years old may have a stronger reaction and need a smaller dose.  What side effects may I notice from receiving this medicine?  Side effects that you should report to your doctor or health care professional as soon as possible:  · allergic reactions like skin rash, itching or hives, swelling of the face, lips, or tongue  · breathing problems  · hallucinations  · signs and symptoms of liver injury like dark yellow or brown urine; general ill feeling or flu-like symptoms; light-colored stools; loss of appetite; nausea; right upper quadrant belly pain; unusually weak or tired; yellowing of the eyes or skin  · signs and symptoms of low blood pressure like dizziness; feeling faint or lightheaded, falls; unusually weak or tired  · unusually slow heartbeat  · unusually weak or tired  Side effects that usually do not require medical attention (report to your doctor or health care professional if they continue or are bothersome):  · blurred vision  · constipation  · dizziness  · dry mouth  · tiredness  What may interact with this medicine?  Do not take this medicine with any of the following  medications:  · ciprofloxacin  · cisapride  · dofetilide  · dronedarone  · fluvoxamine  · narcotic medicines for cough  · pimozide  · thiabendazole  · thioridazine  · ziprasidone  This medicine may also interact with the following medications:  · acyclovir  · alcohol  · antihistamines for allergy, cough and cold  · baclofen  · certain antibiotics like levofloxacin, ofloxacin  · certain medicines for anxiety or sleep  · certain medicines for blood pressure, heart disease, irregular heart beat  · certain medicines for depression like amitriptyline, fluoxetine, sertraline  · certain medicines for seizures like phenobarbital, primidone  · certain medicines for stomach problems like cimetidine, famotidine  · female hormones, like estrogens or progestins and birth control pills, patches, rings, or injections  · general anesthetics like halothane, isoflurane, methoxyflurane, propofol  · local anesthetics like lidocaine, pramoxine, tetracaine  · medicines that relax muscles for surgery  · narcotic medicines for pain  · other medicines that prolong the QT interval (cause an abnormal heart rhythm)  · phenothiazines like chlorpromazine, mesoridazine, prochlorperazine  · ticlopidine  · zileuton  What if I miss a dose?  If you miss a dose, take it as soon as you can. If it is almost time for your next dose, take only that dose. Do not take double or extra doses.  Where should I keep my medicine?  Keep out of the reach of children.  Store at room temperature between 15 and 30 degrees C (59 and 86 degrees F). Throw away any unused medicine after the expiration date.  What should I tell my health care provider before I take this medicine?  They need to know if you have any of these conditions:  · kidney disease  · liver disease  · low blood pressure  · mental disorder  · an unusual or allergic reaction to tizanidine, other medicines, lactose (tablets only), foods, dyes, or preservatives  · pregnant or trying to get  pregnant  · breast-feeding  What should I watch for while using this medicine?  Tell your doctor or health care professional if your symptoms do not start to get better or if they get worse.  You may get drowsy or dizzy. Do not drive, use machinery, or do anything that needs mental alertness until you know how this medicine affects you. Do not stand or sit up quickly, especially if you are an older patient. This reduces the risk of dizzy or fainting spells. Alcohol may interfere with the effect of this medicine. Avoid alcoholic drinks.  If you are taking another medicine that also causes drowsiness, you may have more side effects. Give your health care provider a list of all medicines you use. Your doctor will tell you how much medicine to take. Do not take more medicine than directed. Call emergency for help if you have problems breathing or unusual sleepiness.  Your mouth may get dry. Chewing sugarless gum or sucking hard candy, and drinking plenty of water may help. Contact your doctor if the problem does not go away or is severe.  NOTE:This sheet is a summary. It may not cover all possible information. If you have questions about this medicine, talk to your doctor, pharmacist, or health care provider. Copyright© 2017 [employer]

## 2018-04-05 ENCOUNTER — CLINICAL SUPPORT (OUTPATIENT)
Dept: REHABILITATION | Facility: HOSPITAL | Age: 38
End: 2018-04-05
Attending: NURSE PRACTITIONER
Payer: MEDICAID

## 2018-04-05 DIAGNOSIS — M54.42 ACUTE BACK PAIN WITH SCIATICA, LEFT: Primary | ICD-10-CM

## 2018-04-05 PROCEDURE — 97014 ELECTRIC STIMULATION THERAPY: CPT | Mod: PO | Performed by: PHYSICAL THERAPIST

## 2018-04-05 PROCEDURE — 97012 MECHANICAL TRACTION THERAPY: CPT | Mod: PO | Performed by: PHYSICAL THERAPIST

## 2018-04-05 NOTE — PROGRESS NOTES
DATE OF INITIAL PHYSICAL THERAPY EVALUATION:             2018      REFERRING PROVIDER:       Tania Clark NP      REFERRING DIAGNOSIS:     Acute bilateral low back pain with left-sided sciatica [M54.42]      ORDERS:   Evaluate and treat as indicated    VISIT    #7      SUBJECTIVE:  Patient is complaining of an increase in radiating leg pain today.  She states she has been referred to physiatry.  Patient requesting continuation of the traction.    She states she is working on the stretching exercises daily and only tolerating them fair.    Patients primary complaint(s):  Left LE pain  Lumbar stiffness  Impaired ADL's    History of present condition:  (from initial evaluation)  Patient reports she was injured in 2018 when she slipped on ice on her driveway and fell landing on her buttocks.  Initially she began to experience lumbar pain with numbness and tingling in the left LE.  Her lumbar pain has lessened and now she reports experiencing mostly lumbar stiffness; however, she continues to experience significant pain in the left LE.  She reports the pain is located throughout the lateral aspect of the left lower leg from just below the knee to her ankle.  She also complains of tenderness along the distribution of the left IT Band.  Patient reports her leg pain is constant and is aggravated by standing, bending, and lifting.  Pain increases at night and when first attempting to move about after sitting for an extended period of time.  Pain eases slightly after moving about for a few minutes; however, extended activity increases symptoms.      Pain Ratin/10         4/10 post traction      Patient reports the following functional activity limitations:  Most household chores limited; example, laundry, making the bed (bending and lifting impaired)  Prolonged standing and walking impaired  Unable to work due as a     (FUNCTIONAL ASSESSMENT TOOL)    MODIFIED OSWESTRY LOW BACK PAIN  DISABILITY QUESTIONNAIRE  Complete during initial evaluation on March 5, 2018  The following scores are patient-reported and range from 0-5, with 0 being least impaired and 5 being most impaired.      Section 1- Pain intensity    Score 3/5   Section 2- Person care  Score 3/5   Section 3 Lifting- Optional  Score 3/5     Section 4  Walking  Score 1/5  Section 5 Sitting   Score 1/5   Section 6 Standing  Score 3/5  Section 7 Sleeping  Score 5/5   Section 8 Social Life   Score 4/5  Section 9 Traveling  Score 1/5   Section 10 Employ/home  Score 3/5    Patient reports 54% disability on the Modified Oswestry Low Back Pain Disability Questionnaire.    Past Medical History and co-morbidities:  Past Medical History:   Diagnosis Date    Cholelithiasis      Patient Active Problem List   Diagnosis    Morbid obesity with BMI of 40.0-44.9, adult       Current Outpatient Prescriptions:     tiZANidine (ZANAFLEX) 4 MG tablet, Take 1 tablet (4 mg total) by mouth every 12 (twelve) hours as needed., Disp: 30 tablet, Rfl: 0      Previous physical therapy and treatments:  Patient has not participated in a prior physical therapy program to date for current referring diagnoses.    Occupational/psychosocial/educational profile:  Not employed at this time as a ; unable to work due to leg pain    OBJECTIVE:    Musculoskeletal Exam:  (from initial evaluation)    Postural Exam  No significant postural deviations noted.  SI exam negative for ilial rotation.    ROM  Lumbar Spine:  Flexion:   50% of full expected motion; end point reported to be stiff in the lumbar region, increases left LE pain.     Extension   25% of full expected motion; end point reported to be stiff in the lumbar region; increases left LE pain.   Right side bending  75% of full expected motion   Left side bending  75% of full expected motion      Flexibility  Limited flexibility noted in the HS, IT Band and piriformis bilaterally.    Functional Strength Testing  No  strength deficits or imbalances noted throughout the LE or hip girdle muscle groups.    Palpation  Tender along the left IT Band  Tender over left trochanteric bursa  Non tender over painful area in left lower leg region  Lumbar paraspinal muscle guarding present.    Neuromuscular Exam    Gait  Antalgic with weight bearing on the left LE    Transitional Movements  Complains of leg pain with sit to stand, sit to supine, supine to sit.    Sensation  No sensory disturbances noted throughout the LE's  Patient did not complain of paresthesias.  Positive SLR left LE      PROBLEM LIST - ASSESSMENT  Patient presents with limited lumbar ROM and acute left LE pain which is limiting the patient's mobility and functional activities.  She would benefit from a trial of lumbar traction to address radiating left LE pain and muscle stimulation to decrease lumbar paraspinal muscle guarding.  Patient is benefiting from flexibility exercises for the lumbar, hip girdle, and LE muscle groups.  Will proceed with core strengthening once she becomes less symptomatic.    Activity Limitations, Participation Restrictions, and CO-MORBIDITIES which may impact the plan of care and potentially impede the patient's progress in therapy include:  none    The patient does not present with any learning or communication barriers which may impact the plan of care and potentially impede the patient's progress in therapy.      CLINICAL PRESENTATION:  Patient's Clinical Presentation is STABLE.      RECOMMENDED PLAN OF CARE:    Pelvic traction to address radiating left LE pain.  Muscle stimulation to decrease lumbar paraspinal muscle guarding and stiffness  Lumbar flexibility exercises to improve lumbar ROM  Stretching exercises for the hip girdle and LE muscle groups    TREATMENT PROVIDED:       -moist heat and muscle stimulation x 20 mins applied to the lumbar paraspinal muscle groups to decrease MFP and lumbar spine stiffness  -static lumbar traction in  supine x 20 mins at 90 lbs  -passive stretching provided by the therapist (one on one 5 mins)  -Lumbar rotational stretches in supine  -Lumbar flexion stretches in supine  -hamstring stretching  -piriformis stretching  -IT Band stretching  -gluteus medius stretching  -stretching of the hip internal rotators    Patient was unable to tolerate the stretching today due to complaints of muscle spasms in the left LE.    Patient has been instructed in the following exercises.  -Lumbar rotational ROM in supine  -Lumbar rotational stretches in supine  -Lumbar flexion stretches in supine  -hamstring stretching  -piriformis stretching  -IT Band stretching  -gluteus medius stretching  -stretching of the hip internal rotators    Will instruct patient in the following as tolerated:  -Level one trunk stabilization  ---abdominal crunches; neutral position  ---abdominal crunches; diagonal patterns  ---bridging  ---bridging with hip flexion lifts    PLAN:    Patient to return to out-patient physical therapy to continue the recommended plan of care.    SHORT TERM GOALS:    1. Patient will report pain rating for left LE 3/10 or less  2. Instruct patient in lumbar flexibility exercises and stretching for the hip girdle and LE muscle groups  3. Patient to report compliance with daily stretching exercises.    LONG TERM GOALS:  1. Resolution of left LE radiating pain  2. Full, pain free ROM of the lumbar spine  3. Patient will report a 25% or greater decrease in functional disability on the Oswestry functional assessment tool.      These services are reasonable and necessary for the conditions set forth above while under my care.

## 2018-04-10 ENCOUNTER — CLINICAL SUPPORT (OUTPATIENT)
Dept: REHABILITATION | Facility: HOSPITAL | Age: 38
End: 2018-04-10
Attending: NURSE PRACTITIONER
Payer: MEDICAID

## 2018-04-10 DIAGNOSIS — M54.42 ACUTE BACK PAIN WITH SCIATICA, LEFT: Primary | ICD-10-CM

## 2018-04-10 PROCEDURE — 97014 ELECTRIC STIMULATION THERAPY: CPT | Mod: PO | Performed by: PHYSICAL THERAPIST

## 2018-04-10 PROCEDURE — 97012 MECHANICAL TRACTION THERAPY: CPT | Mod: PO | Performed by: PHYSICAL THERAPIST

## 2018-04-12 NOTE — PROGRESS NOTES
DATE OF INITIAL PHYSICAL THERAPY EVALUATION:             2018      REFERRING PROVIDER:       Tania Clark NP      REFERRING DIAGNOSIS:     Acute bilateral low back pain with left-sided sciatica [M54.42]      ORDERS:   Evaluate and treat as indicated    VISIT    #8      SUBJECTIVE:  Patient states the only thing providing any relief at this time is traction; however, benefit from traction continues to be temporary.  Patient reports she is waiting to see Dr. Nava.    She states she is working on the stretching exercises daily and only tolerating them fair.    Patients primary complaint(s):  Left LE pain  Lumbar stiffness  Impaired ADL's    History of present condition:  (from initial evaluation)  Patient reports she was injured in 2018 when she slipped on ice on her driveway and fell landing on her buttocks.  Initially she began to experience lumbar pain with numbness and tingling in the left LE.  Her lumbar pain has lessened and now she reports experiencing mostly lumbar stiffness; however, she continues to experience significant pain in the left LE.  She reports the pain is located throughout the lateral aspect of the left lower leg from just below the knee to her ankle.  She also complains of tenderness along the distribution of the left IT Band.  Patient reports her leg pain is constant and is aggravated by standing, bending, and lifting.  Pain increases at night and when first attempting to move about after sitting for an extended period of time.  Pain eases slightly after moving about for a few minutes; however, extended activity increases symptoms.      Pain Ratin/10         4/10 post traction      Patient reports the following functional activity limitations:  Most household chores limited; example, laundry, making the bed (bending and lifting impaired)  Prolonged standing and walking impaired  Unable to work due as a     (FUNCTIONAL ASSESSMENT TOOL)    MODIFIED  OSWESTRY LOW BACK PAIN DISABILITY QUESTIONNAIRE  Complete during initial evaluation on March 5, 2018  The following scores are patient-reported and range from 0-5, with 0 being least impaired and 5 being most impaired.      Section 1- Pain intensity    Score 3/5   Section 2- Person care  Score 3/5   Section 3 Lifting- Optional  Score 3/5     Section 4  Walking  Score 1/5  Section 5 Sitting   Score 1/5   Section 6 Standing  Score 3/5  Section 7 Sleeping  Score 5/5   Section 8 Social Life   Score 4/5  Section 9 Traveling  Score 1/5   Section 10 Employ/home  Score 3/5    Patient reports 54% disability on the Modified Oswestry Low Back Pain Disability Questionnaire.    Past Medical History and co-morbidities:  Past Medical History:   Diagnosis Date    Cholelithiasis      Patient Active Problem List   Diagnosis    Morbid obesity with BMI of 40.0-44.9, adult       Current Outpatient Prescriptions:     tiZANidine (ZANAFLEX) 4 MG tablet, Take 1 tablet (4 mg total) by mouth every 12 (twelve) hours as needed., Disp: 30 tablet, Rfl: 0      Previous physical therapy and treatments:  Patient has not participated in a prior physical therapy program to date for current referring diagnoses.    Occupational/psychosocial/educational profile:  Not employed at this time as a ; unable to work due to leg pain    OBJECTIVE:    Musculoskeletal Exam:  (from initial evaluation)    Postural Exam  No significant postural deviations noted.  SI exam negative for ilial rotation.    ROM  Lumbar Spine:  Flexion:   50% of full expected motion; end point reported to be stiff in the lumbar region, increases left LE pain.     Extension   25% of full expected motion; end point reported to be stiff in the lumbar region; increases left LE pain.   Right side bending  75% of full expected motion   Left side bending  75% of full expected motion      Flexibility  Limited flexibility noted in the HS, IT Band and piriformis bilaterally.    Functional  Strength Testing  No strength deficits or imbalances noted throughout the LE or hip girdle muscle groups.    Palpation  Tender along the left IT Band  Tender over left trochanteric bursa  Non tender over painful area in left lower leg region  Lumbar paraspinal muscle guarding present.    Neuromuscular Exam    Gait  Antalgic with weight bearing on the left LE    Transitional Movements  Complains of leg pain with sit to stand, sit to supine, supine to sit.    Sensation  No sensory disturbances noted throughout the LE's  Patient did not complain of paresthesias.  Positive SLR left LE      PROBLEM LIST - ASSESSMENT  Patient presents with limited lumbar ROM and acute left LE pain which is limiting the patient's mobility and functional activities.  Lumbar traction is providing temporary relief to her radiating left LE pain as she waits to see Dr. Nava.  Muscle stimulation relieves lumbar paraspinal muscle guarding.  Patient is benefiting from flexibility exercises for the lumbar, hip girdle, and LE muscle groups.  Will proceed with core strengthening once she becomes less symptomatic.    Activity Limitations, Participation Restrictions, and CO-MORBIDITIES which may impact the plan of care and potentially impede the patient's progress in therapy include:  none    The patient does not present with any learning or communication barriers which may impact the plan of care and potentially impede the patient's progress in therapy.      CLINICAL PRESENTATION:  Patient's Clinical Presentation is STABLE.      RECOMMENDED PLAN OF CARE:    Pelvic traction to address radiating left LE pain.  Muscle stimulation to decrease lumbar paraspinal muscle guarding and stiffness  Lumbar flexibility exercises to improve lumbar ROM  Stretching exercises for the hip girdle and LE muscle groups    TREATMENT PROVIDED:       -moist heat and muscle stimulation x 20 mins applied to the lumbar paraspinal muscle groups to decrease MFP and lumbar  spine stiffness  -static lumbar traction in supine x 20 mins at 90 lbs  -passive stretching provided by the therapist - deferred  -Lumbar rotational stretches in supine  -Lumbar flexion stretches in supine  -hamstring stretching  -piriformis stretching  -IT Band stretching  -gluteus medius stretching  -stretching of the hip internal rotators    Patient was unable to tolerate the stretching today due to complaints of muscle spasms in the left LE.    Patient has been instructed in the following exercises.  -Lumbar rotational ROM in supine  -Lumbar rotational stretches in supine  -Lumbar flexion stretches in supine  -hamstring stretching  -piriformis stretching  -IT Band stretching  -gluteus medius stretching  -stretching of the hip internal rotators    Will instruct patient in the following as tolerated:  -Level one trunk stabilization  ---abdominal crunches; neutral position  ---abdominal crunches; diagonal patterns  ---bridging  ---bridging with hip flexion lifts    PLAN:    Patient to return to out-patient physical therapy to continue the recommended plan of care.    SHORT TERM GOALS:    1. Patient will report pain rating for left LE 3/10 or less  2. Instruct patient in lumbar flexibility exercises and stretching for the hip girdle and LE muscle groups  3. Patient to report compliance with daily stretching exercises.    LONG TERM GOALS:  1. Resolution of left LE radiating pain  2. Full, pain free ROM of the lumbar spine  3. Patient will report a 25% or greater decrease in functional disability on the Oswestry functional assessment tool.      These services are reasonable and necessary for the conditions set forth above while under my care.

## 2018-04-13 ENCOUNTER — CLINICAL SUPPORT (OUTPATIENT)
Dept: REHABILITATION | Facility: HOSPITAL | Age: 38
End: 2018-04-13
Attending: NURSE PRACTITIONER
Payer: MEDICAID

## 2018-04-13 DIAGNOSIS — M54.42 ACUTE BACK PAIN WITH SCIATICA, LEFT: Primary | ICD-10-CM

## 2018-04-13 PROCEDURE — 97110 THERAPEUTIC EXERCISES: CPT | Mod: PO | Performed by: PHYSICAL THERAPIST

## 2018-04-13 PROCEDURE — 97014 ELECTRIC STIMULATION THERAPY: CPT | Mod: PO | Performed by: PHYSICAL THERAPIST

## 2018-04-13 PROCEDURE — 97140 MANUAL THERAPY 1/> REGIONS: CPT | Mod: PO | Performed by: PHYSICAL THERAPIST

## 2018-04-13 NOTE — PROGRESS NOTES
DATE OF INITIAL PHYSICAL THERAPY EVALUATION:             2018      REFERRING PROVIDER:       Tania Clark NP      REFERRING DIAGNOSIS:     Acute bilateral low back pain with left-sided sciatica [M54.42]      ORDERS:   Evaluate and treat as indicated    VISIT    #9      SUBJECTIVE:  Patient states she is not as uncomfortable today, although she experienced mild muscle spasms in the LE's following her last treatment of traction.   Traction continues to be beneficial overall.  Patient reports she is waiting to see Dr. Nava on .    She states she is working on the stretching exercises daily and only tolerating them fair.    Patients primary complaint(s):  Left LE pain  Lumbar stiffness  Impaired ADL's    History of present condition:  (from initial evaluation)  Patient reports she was injured in 2018 when she slipped on ice on her driveway and fell landing on her buttocks.  Initially she began to experience lumbar pain with numbness and tingling in the left LE.  Her lumbar pain has lessened and now she reports experiencing mostly lumbar stiffness; however, she continues to experience significant pain in the left LE.  She reports the pain is located throughout the lateral aspect of the left lower leg from just below the knee to her ankle.  She also complains of tenderness along the distribution of the left IT Band.  Patient reports her leg pain is constant and is aggravated by standing, bending, and lifting.  Pain increases at night and when first attempting to move about after sitting for an extended period of time.  Pain eases slightly after moving about for a few minutes; however, extended activity increases symptoms.      Pain Ratin/10         4/10 post traction      Patient reports the following functional activity limitations:  Most household chores limited; example, laundry, making the bed (bending and lifting impaired)  Prolonged standing and walking  impaired  Unable to work due as a     (FUNCTIONAL ASSESSMENT TOOL)    MODIFIED OSWESTRY LOW BACK PAIN DISABILITY QUESTIONNAIRE  Complete during initial evaluation on March 5, 2018  The following scores are patient-reported and range from 0-5, with 0 being least impaired and 5 being most impaired.      Section 1- Pain intensity    Score 3/5   Section 2- Person care  Score 3/5   Section 3 Lifting- Optional  Score 3/5     Section 4  Walking  Score 1/5  Section 5 Sitting   Score 1/5   Section 6 Standing  Score 3/5  Section 7 Sleeping  Score 5/5   Section 8 Social Life   Score 4/5  Section 9 Traveling  Score 1/5   Section 10 Employ/home  Score 3/5    Patient reports 54% disability on the Modified Oswestry Low Back Pain Disability Questionnaire.    Past Medical History and co-morbidities:  Past Medical History:   Diagnosis Date    Cholelithiasis      Patient Active Problem List   Diagnosis    Morbid obesity with BMI of 40.0-44.9, adult       Current Outpatient Prescriptions:     tiZANidine (ZANAFLEX) 4 MG tablet, Take 1 tablet (4 mg total) by mouth every 12 (twelve) hours as needed., Disp: 30 tablet, Rfl: 0      Previous physical therapy and treatments:  Patient has not participated in a prior physical therapy program to date for current referring diagnoses.    Occupational/psychosocial/educational profile:  Not employed at this time as a ; unable to work due to leg pain    OBJECTIVE:    Musculoskeletal Exam:  (from initial evaluation)    Postural Exam  No significant postural deviations noted.  SI exam negative for ilial rotation.    ROM  Lumbar Spine:  Flexion:   50% of full expected motion; end point reported to be stiff in the lumbar region, increases left LE pain.     Extension   25% of full expected motion; end point reported to be stiff in the lumbar region; increases left LE pain.   Right side bending  75% of full expected motion   Left side bending  75% of full expected  motion      Flexibility  Limited flexibility noted in the HS, IT Band and piriformis bilaterally.    Functional Strength Testing  No strength deficits or imbalances noted throughout the LE or hip girdle muscle groups.    Palpation  Tender along the left IT Band  Tender over left trochanteric bursa  Non tender over painful area in left lower leg region  Lumbar paraspinal muscle guarding present.    Neuromuscular Exam    Gait  Antalgic with weight bearing on the left LE    Transitional Movements  Complains of leg pain with sit to stand, sit to supine, supine to sit.    Sensation  No sensory disturbances noted throughout the LE's  Patient did not complain of paresthesias.  Positive SLR left LE      PROBLEM LIST - ASSESSMENT  Patient presents with limited lumbar ROM and acute left LE pain which is limiting the patient's mobility and functional activities.  Lumbar traction is providing temporary relief to her radiating left LE pain as she waits to see Dr. Nava.  Muscle stimulation relieves lumbar paraspinal muscle guarding.  Patient is benefiting from flexibility exercises for the lumbar, hip girdle, and LE muscle groups.  Will proceed with core strengthening once she becomes less symptomatic.    Activity Limitations, Participation Restrictions, and CO-MORBIDITIES which may impact the plan of care and potentially impede the patient's progress in therapy include:  none    The patient does not present with any learning or communication barriers which may impact the plan of care and potentially impede the patient's progress in therapy.      CLINICAL PRESENTATION:  Patient's Clinical Presentation is STABLE.      RECOMMENDED PLAN OF CARE:    Pelvic traction to address radiating left LE pain.  Muscle stimulation to decrease lumbar paraspinal muscle guarding and stiffness  Lumbar flexibility exercises to improve lumbar ROM  Stretching exercises for the hip girdle and LE muscle groups    TREATMENT PROVIDED:       -moist heat  and muscle stimulation x 20 mins applied to the lumbar paraspinal muscle groups to decrease MFP and lumbar spine stiffness  -static lumbar traction in supine x 20 mins at 90 lbs  -passive stretching provided by the therapist - 10 mins one on one with there therapist  -Lumbar rotational stretches in supine  -Lumbar flexion stretches in supine  -hamstring stretching  -piriformis stretching  -IT Band stretching  -gluteus medius stretching  -stretching of the hip internal rotators      Patient has been instructed in the following exercises.  -Lumbar rotational ROM in supine  -Lumbar rotational stretches in supine  -Lumbar flexion stretches in supine  -hamstring stretching  -piriformis stretching  -IT Band stretching  -gluteus medius stretching  -stretching of the hip internal rotators    Will instruct patient in the following as tolerated:  -Level one trunk stabilization  ---abdominal crunches; neutral position  ---abdominal crunches; diagonal patterns  ---bridging  ---bridging with hip flexion lifts    PLAN:    Patient to return to out-patient physical therapy to continue the recommended plan of care.    SHORT TERM GOALS:    1. Patient will report pain rating for left LE 3/10 or less  2. Instruct patient in lumbar flexibility exercises and stretching for the hip girdle and LE muscle groups  3. Patient to report compliance with daily stretching exercises.    LONG TERM GOALS:  1. Resolution of left LE radiating pain  2. Full, pain free ROM of the lumbar spine  3. Patient will report a 25% or greater decrease in functional disability on the Oswestry functional assessment tool.      These services are reasonable and necessary for the conditions set forth above while under my care.

## 2018-04-16 ENCOUNTER — CLINICAL SUPPORT (OUTPATIENT)
Dept: REHABILITATION | Facility: HOSPITAL | Age: 38
End: 2018-04-16
Attending: NURSE PRACTITIONER
Payer: MEDICAID

## 2018-04-16 DIAGNOSIS — M54.42 ACUTE BACK PAIN WITH SCIATICA, LEFT: Primary | ICD-10-CM

## 2018-04-16 PROCEDURE — 97014 ELECTRIC STIMULATION THERAPY: CPT | Mod: PO | Performed by: PHYSICAL THERAPIST

## 2018-04-16 PROCEDURE — 97012 MECHANICAL TRACTION THERAPY: CPT | Mod: PO | Performed by: PHYSICAL THERAPIST

## 2018-04-16 NOTE — PROGRESS NOTES
DATE OF INITIAL PHYSICAL THERAPY EVALUATION:             March 5, 2018      REFERRING PROVIDER:       Tania Clark NP      REFERRING DIAGNOSIS:     Acute bilateral low back pain with left-sided sciatica [M54.42]      ORDERS:   Evaluate and treat as indicated    VISIT    #10      SUBJECTIVE:  Patient states she is experiencing less pain overall today.  She has not experienced and additional episodes of LE muscle spasms.  She reports benefiting from the traction.    Patient reports she is waiting to see Dr. Nava on April 20th.    She states she is working on the stretching exercises daily and only tolerating them fair.    Patients primary complaint(s):  Left LE pain  Lumbar stiffness  Impaired ADL's    History of present condition:  (from initial evaluation)  Patient reports she was injured in January 2018 when she slipped on ice on her driveway and fell landing on her buttocks.  Initially she began to experience lumbar pain with numbness and tingling in the left LE.  Her lumbar pain has lessened and now she reports experiencing mostly lumbar stiffness; however, she continues to experience significant pain in the left LE.  She reports the pain is located throughout the lateral aspect of the left lower leg from just below the knee to her ankle.  She also complains of tenderness along the distribution of the left IT Band.  Patient reports her leg pain is constant and is aggravated by standing, bending, and lifting.  Pain increases at night and when first attempting to move about after sitting for an extended period of time.  Pain eases slightly after moving about for a few minutes; however, extended activity increases symptoms.      Pain Rating:     3/10        Patient reports the following functional activity limitations:  Most household chores limited; example, laundry, making the bed (bending and lifting impaired)  Prolonged standing and walking impaired  Unable to work due as a     (FUNCTIONAL  ASSESSMENT TOOL)    MODIFIED OSWESTRY LOW BACK PAIN DISABILITY QUESTIONNAIRE  Complete during initial evaluation on March 5, 2018  The following scores are patient-reported and range from 0-5, with 0 being least impaired and 5 being most impaired.      Section 1- Pain intensity    Score 3/5   Section 2- Person care  Score 3/5   Section 3 Lifting- Optional  Score 3/5     Section 4  Walking  Score 1/5  Section 5 Sitting   Score 1/5   Section 6 Standing  Score 3/5  Section 7 Sleeping  Score 5/5   Section 8 Social Life   Score 4/5  Section 9 Traveling  Score 1/5   Section 10 Employ/home  Score 3/5    Patient reports 54% disability on the Modified Oswestry Low Back Pain Disability Questionnaire.    Past Medical History and co-morbidities:  Past Medical History:   Diagnosis Date    Cholelithiasis      Patient Active Problem List   Diagnosis    Morbid obesity with BMI of 40.0-44.9, adult       Current Outpatient Prescriptions:     tiZANidine (ZANAFLEX) 4 MG tablet, Take 1 tablet (4 mg total) by mouth every 12 (twelve) hours as needed., Disp: 30 tablet, Rfl: 0      Previous physical therapy and treatments:  Patient has not participated in a prior physical therapy program to date for current referring diagnoses.    Occupational/psychosocial/educational profile:  Not employed at this time as a ; unable to work due to leg pain    OBJECTIVE:    Musculoskeletal Exam:  (from initial evaluation)    Postural Exam  No significant postural deviations noted.  SI exam negative for ilial rotation.    ROM  Lumbar Spine:  Flexion:   50% of full expected motion; end point reported to be stiff in the lumbar region, increases left LE pain.     Extension   25% of full expected motion; end point reported to be stiff in the lumbar region; increases left LE pain.   Right side bending  75% of full expected motion   Left side bending  75% of full expected motion      Flexibility  Limited flexibility noted in the HS, IT Band and  piriformis bilaterally.    Functional Strength Testing  No strength deficits or imbalances noted throughout the LE or hip girdle muscle groups.    Palpation  Tender along the left IT Band  Tender over left trochanteric bursa  Non tender over painful area in left lower leg region  Lumbar paraspinal muscle guarding present.    Neuromuscular Exam    Gait  Antalgic with weight bearing on the left LE    Transitional Movements  Complains of leg pain with sit to stand, sit to supine, supine to sit.    Sensation  No sensory disturbances noted throughout the LE's  Patient did not complain of paresthesias.  Positive SLR left LE      PROBLEM LIST - ASSESSMENT  Patient presents with limited lumbar ROM and acute left LE pain which is limiting the patient's mobility and functional activities.  Lumbar traction is providing temporary relief to her radiating left LE pain as she waits to see Dr. Nava.  Muscle stimulation relieves lumbar paraspinal muscle guarding.  Patient is benefiting from flexibility exercises for the lumbar, hip girdle, and LE muscle groups.  Will proceed with core strengthening once she becomes less symptomatic.    Activity Limitations, Participation Restrictions, and CO-MORBIDITIES which may impact the plan of care and potentially impede the patient's progress in therapy include:  none    The patient does not present with any learning or communication barriers which may impact the plan of care and potentially impede the patient's progress in therapy.      CLINICAL PRESENTATION:  Patient's Clinical Presentation is STABLE.      RECOMMENDED PLAN OF CARE:    Pelvic traction to address radiating left LE pain.  Muscle stimulation to decrease lumbar paraspinal muscle guarding and stiffness  Lumbar flexibility exercises to improve lumbar ROM  Stretching exercises for the hip girdle and LE muscle groups    TREATMENT PROVIDED:       -moist heat and muscle stimulation x 20 mins applied to the lumbar paraspinal muscle  groups to decrease MFP and lumbar spine stiffness  -static lumbar traction in supine x 20 mins at 90 lbs      -passive stretching provided by the therapist - deferred  -Lumbar rotational stretches in supine  -Lumbar flexion stretches in supine  -hamstring stretching  -piriformis stretching  -IT Band stretching  -gluteus medius stretching  -stretching of the hip internal rotators      Patient has been instructed in the following exercises.  -Lumbar rotational ROM in supine  -Lumbar rotational stretches in supine  -Lumbar flexion stretches in supine  -hamstring stretching  -piriformis stretching  -IT Band stretching  -gluteus medius stretching  -stretching of the hip internal rotators    Will instruct patient in the following as tolerated:  -Level one trunk stabilization  ---abdominal crunches; neutral position  ---abdominal crunches; diagonal patterns  ---bridging  ---bridging with hip flexion lifts    PLAN:    Patient to return to out-patient physical therapy to continue the recommended plan of care.    SHORT TERM GOALS:    1. Patient will report pain rating for left LE 3/10 or less  2. Instruct patient in lumbar flexibility exercises and stretching for the hip girdle and LE muscle groups  3. Patient to report compliance with daily stretching exercises.    LONG TERM GOALS:  1. Resolution of left LE radiating pain  2. Full, pain free ROM of the lumbar spine  3. Patient will report a 25% or greater decrease in functional disability on the Oswestry functional assessment tool.      These services are reasonable and necessary for the conditions set forth above while under my care.

## 2018-04-19 ENCOUNTER — CLINICAL SUPPORT (OUTPATIENT)
Dept: REHABILITATION | Facility: HOSPITAL | Age: 38
End: 2018-04-19
Attending: NURSE PRACTITIONER
Payer: MEDICAID

## 2018-04-19 DIAGNOSIS — M54.42 ACUTE BACK PAIN WITH SCIATICA, LEFT: Primary | ICD-10-CM

## 2018-04-19 PROCEDURE — 97012 MECHANICAL TRACTION THERAPY: CPT | Mod: PO | Performed by: PHYSICAL THERAPIST

## 2018-04-19 PROCEDURE — 97014 ELECTRIC STIMULATION THERAPY: CPT | Mod: PO | Performed by: PHYSICAL THERAPIST

## 2018-04-19 NOTE — PROGRESS NOTES
DATE OF INITIAL PHYSICAL THERAPY EVALUATION:             2018      REFERRING PROVIDER:       Tania Clark NP      REFERRING DIAGNOSIS:     Acute bilateral low back pain with left-sided sciatica [M54.42]      ORDERS:   Evaluate and treat as indicated    VISIT    #11      SUBJECTIVE:  Patient states she is experiencing an increase in lumbar and leg pain today after working a busy shift at work yesterday.  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    Patient reports she is waiting to see Dr. Nava on .    She states she is working on the stretching exercises daily and only tolerating them fair.  She has not been able to tolerated initiating core exercises.      Patients primary complaint(s):  Left LE pain  Lumbar stiffness  Impaired ADL's    History of present condition:  (from initial evaluation)  Patient reports she was injured in 2018 when she slipped on ice on her driveway and fell landing on her buttocks.  Initially she began to experience lumbar pain with numbness and tingling in the left LE.  Her lumbar pain has lessened and now she reports experiencing mostly lumbar stiffness; however, she continues to experience significant pain in the left LE.  She reports the pain is located throughout the lateral aspect of the left lower leg from just below the knee to her ankle.  She also complains of tenderness along the distribution of the left IT Band.  Patient reports her leg pain is constant and is aggravated by standing, bending, and lifting.  Pain increases at night and when first attempting to move about after sitting for an extended period of time.  Pain eases slightly after moving about for a few minutes; however, extended activity increases symptoms.      Pain Ratin/10        Patient reports the following functional activity limitations:  Most household chores limited; example, laundry, making the bed (bending and lifting  impaired)  Prolonged standing and walking impaired  Unable to work due as a     (FUNCTIONAL ASSESSMENT TOOL)    MODIFIED OSWESTRY LOW BACK PAIN DISABILITY QUESTIONNAIRE  Complete during initial evaluation on March 5, 2018  The following scores are patient-reported and range from 0-5, with 0 being least impaired and 5 being most impaired.      Section 1- Pain intensity    Score 3/5   Section 2- Person care  Score 3/5   Section 3 Lifting- Optional  Score 3/5     Section 4  Walking  Score 1/5  Section 5 Sitting   Score 1/5   Section 6 Standing  Score 3/5  Section 7 Sleeping  Score 5/5   Section 8 Social Life   Score 4/5  Section 9 Traveling  Score 1/5   Section 10 Employ/home  Score 3/5    Patient reports 54% disability on the Modified Oswestry Low Back Pain Disability Questionnaire.    Past Medical History and co-morbidities:  Past Medical History:   Diagnosis Date    Cholelithiasis      Patient Active Problem List   Diagnosis    Morbid obesity with BMI of 40.0-44.9, adult       Current Outpatient Prescriptions:     tiZANidine (ZANAFLEX) 4 MG tablet, Take 1 tablet (4 mg total) by mouth every 12 (twelve) hours as needed., Disp: 30 tablet, Rfl: 0      Previous physical therapy and treatments:  Patient has not participated in a prior physical therapy program to date for current referring diagnoses.    Occupational/psychosocial/educational profile:  Not employed at this time as a ; unable to work due to leg pain    OBJECTIVE:    Musculoskeletal Exam:  (from initial evaluation)    Postural Exam  No significant postural deviations noted.  SI exam negative for ilial rotation.    ROM  Lumbar Spine:  Flexion:   50% of full expected motion; end point reported to be stiff in the lumbar region, increases left LE pain.     Extension   25% of full expected motion; end point reported to be stiff in the lumbar region; increases left LE pain.   Right side bending  75% of full expected motion   Left side bending  75%  of full expected motion      Flexibility  Limited flexibility noted in the HS, IT Band and piriformis bilaterally.    Functional Strength Testing  No strength deficits or imbalances noted throughout the LE or hip girdle muscle groups.    Palpation  Tender along the left IT Band  Tender over left trochanteric bursa  Non tender over painful area in left lower leg region  Lumbar paraspinal muscle guarding present.    Neuromuscular Exam    Gait  Antalgic with weight bearing on the left LE    Transitional Movements  Complains of leg pain with sit to stand, sit to supine, supine to sit.    Sensation  No sensory disturbances noted throughout the LE's  Patient did not complain of paresthesias.  Positive SLR left LE      PROBLEM LIST - ASSESSMENT  Patient presents with limited lumbar ROM and acute left LE pain which is limiting the patient's mobility and functional activities.  Lumbar traction is providing temporary relief to her radiating left LE pain as she waits to see Dr. Nava.  Muscle stimulation relieves lumbar paraspinal muscle guarding.  Patient is benefiting from flexibility exercises for the lumbar, hip girdle, and LE muscle groups.  Will proceed with core strengthening once she becomes less symptomatic.    Activity Limitations, Participation Restrictions, and CO-MORBIDITIES which may impact the plan of care and potentially impede the patient's progress in therapy include:  none    The patient does not present with any learning or communication barriers which may impact the plan of care and potentially impede the patient's progress in therapy.      CLINICAL PRESENTATION:  Patient's Clinical Presentation is STABLE.      RECOMMENDED PLAN OF CARE:    Pelvic traction to address radiating left LE pain.  Muscle stimulation to decrease lumbar paraspinal muscle guarding and stiffness  Lumbar flexibility exercises to improve lumbar ROM  Stretching exercises for the hip girdle and LE muscle groups    TREATMENT PROVIDED:        -moist heat and muscle stimulation x 20 mins applied to the lumbar paraspinal muscle groups to decrease MFP and lumbar spine stiffness  -static lumbar traction in supine x 20 mins at 90 lbs    Patient has not been able to tolerate initiating core exercises.        -passive stretching provided by the therapist - deferred  -Lumbar rotational stretches in supine  -Lumbar flexion stretches in supine  -hamstring stretching  -piriformis stretching  -IT Band stretching  -gluteus medius stretching  -stretching of the hip internal rotators      Patient has been instructed in the following exercises.  -Lumbar rotational ROM in supine  -Lumbar rotational stretches in supine  -Lumbar flexion stretches in supine  -hamstring stretching  -piriformis stretching  -IT Band stretching  -gluteus medius stretching  -stretching of the hip internal rotators    Will instruct patient in the following as tolerated:  -Level one trunk stabilization  ---abdominal crunches; neutral position  ---abdominal crunches; diagonal patterns  ---bridging  ---bridging with hip flexion lifts    PLAN:    Patient to return to out-patient physical therapy to continue the recommended plan of care.    SHORT TERM GOALS:    1. Patient will report pain rating for left LE 3/10 or less  2. Instruct patient in lumbar flexibility exercises and stretching for the hip girdle and LE muscle groups  3. Patient to report compliance with daily stretching exercises.    LONG TERM GOALS:  1. Resolution of left LE radiating pain  2. Full, pain free ROM of the lumbar spine  3. Patient will report a 25% or greater decrease in functional disability on the Oswestry functional assessment tool.      These services are reasonable and necessary for the conditions set forth above while under my care.

## 2018-04-20 ENCOUNTER — OFFICE VISIT (OUTPATIENT)
Dept: PHYSICAL MEDICINE AND REHAB | Facility: CLINIC | Age: 38
End: 2018-04-20
Payer: MEDICAID

## 2018-04-20 VITALS
RESPIRATION RATE: 14 BRPM | WEIGHT: 282 LBS | DIASTOLIC BLOOD PRESSURE: 93 MMHG | HEART RATE: 81 BPM | HEIGHT: 69 IN | SYSTOLIC BLOOD PRESSURE: 148 MMHG | BODY MASS INDEX: 41.77 KG/M2

## 2018-04-20 DIAGNOSIS — M47.26 OSTEOARTHRITIS OF SPINE WITH RADICULOPATHY, LUMBAR REGION: Primary | ICD-10-CM

## 2018-04-20 DIAGNOSIS — E66.01 MORBID OBESITY WITH BMI OF 40.0-44.9, ADULT: ICD-10-CM

## 2018-04-20 PROCEDURE — 99213 OFFICE O/P EST LOW 20 MIN: CPT | Mod: PBBFAC,PO | Performed by: PHYSICAL MEDICINE & REHABILITATION

## 2018-04-20 PROCEDURE — 99204 OFFICE O/P NEW MOD 45 MIN: CPT | Mod: S$PBB,,, | Performed by: PHYSICAL MEDICINE & REHABILITATION

## 2018-04-20 PROCEDURE — 99999 PR PBB SHADOW E&M-EST. PATIENT-LVL III: CPT | Mod: PBBFAC,,, | Performed by: PHYSICAL MEDICINE & REHABILITATION

## 2018-04-20 RX ORDER — GABAPENTIN 300 MG/1
300 CAPSULE ORAL NIGHTLY
Qty: 30 CAPSULE | Refills: 1 | Status: SHIPPED | OUTPATIENT
Start: 2018-04-20 | End: 2019-02-06 | Stop reason: SDUPTHER

## 2018-04-20 RX ORDER — ETODOLAC 400 MG/1
400 TABLET, FILM COATED ORAL 2 TIMES DAILY
Qty: 60 TABLET | Refills: 1 | Status: SHIPPED | OUTPATIENT
Start: 2018-04-20 | End: 2019-02-06 | Stop reason: SDUPTHER

## 2018-04-20 NOTE — PATIENT INSTRUCTIONS
Degenerative Disk Disease    Spinal disks are gel-filled cushions between the bones, or vertebrae, of the spine. The disks act like shock absorbers. Over time, the disks may break down. This is called degenerative disk disease.  This condition can affect the neck or back. It is one of the most common causes of low back pain. It is the leading cause of disability in people under age 45 in the [place]. The pain often remains localized to the lower back or neck. Muscle spasm is often present and adds to the pain.  Disk degeneration is a natural part of aging. But it is not painful for most people. It may also occur as a result of repeated minor injuries due to daily activities, sports, or accidents. It may lead to osteoarthritis of the spine. Back pain related to disk disease may come and go. Or it may become chronic and last for months or years. The disk may bulge or rupture. This is called a slipped disk or herniated disk. That can put pressure on a nearby spinal nerve and cause neck or back pain that spreads down one arm or leg.  X-rays or an MRI may help to diagnose this condition. For acute pain, treatment includes anti-inflammatory medicines, muscle relaxants, rest, ice, or heat. Strong prescription pain medicines, called opioids, may be needed for short-term treatment if pain suddenly gets worse. Opioid medicines can be addictive. So they are not advised for long-term pain management. Other types of medicines are preferred. Surgery is generally not used to treat this condition unless there is a complication.    Home care  · For neck pain: Use a comfortable pillow that supports the head and keeps the spine in a neutral position. Your head should not be tilted forward or backward.  · For back pain: Avoid sitting for long periods of time. This puts more stress on the lower back than standing or walking. Starting a regular exercise program to strengthen the supporting muscles of the spine will make it easier  to live with degenerative disk disease.  · Apply an ice pack over the injured area for no more than 15 to 20 minutes. Do this every 3 to 6 hours for the first 24 to 48 hours. To make an ice pack, put ice cubes in a plastic bag that seals at the top. Wrap the bag in a clean, thin towel or cloth. Never put ice or an ice pack directly on the skin. Keep using ice packs to ease pain and swelling as needed. After 48 hours, apply heat (warm shower or warm bath) for 20 minutes several times a day, or switch between ice and heat.   · You may use over-the-counter pain medicine to control pain, unless another pain medicine was prescribed. If you have chronic liver or kidney disease or ever had a stomach ulcer or GI bleeding, talk with your provider before using these medicines.  Follow-up care  Follow up with your healthcare provider, or as directed.  If X-rays, a CT scan or an MRI were done, you will be notified of any new findings that may affect your care.  When to seek medical advice  Contact your healthcare provider right away if any of these occur:  · Increasing back pain  · Your foot drags when you walk, a condition called foot drop  · You have new weakness, numbness, or pain in one or both arms or legs  · Loss of bowel or bladder control  · Numbness or tingling in the buttock or groin area  Date Last Reviewed: 11/23/2015  © 4617-0276 [employer]. [address] [place] [address]. All rights reserved. This information is not intended as a substitute for professional medical care. Always follow your healthcare professional's instructions.        Osteoarthritis: Natural and Alternative Treatments     Therapeutic massage is one alternative treatment option.   The treatment for osteoarthritis includes lifestyle changes like weight loss and exercise. Medicines and surgery may also be part of the treatment. There are also many natural and alternative treatments. These treatments may also help relieve  pain and stiffness caused by osteoarthritis.  Heat and cold  Using heat and cold treatments are simple ways to lessen arthritis symptoms:  · Heat soothes stiff joints and tired muscles. Heat works well before exercise, for example. Heat treatments include:  ¨ A warm shower or baths, or soak (for example, fill the sink with warm water and move your fingers, hands, and wrists around in the water)  ¨ A moist heating pad  ¨ A warm, moist wash cloth  ¨ An electric blanket or throw  · Cold treatments help to numb painful areas and decrease swelling. Cold treatments include the following wrapped in a thin towel:  ¨ An ice pack or bag of ice  ¨ A gel-filled cold pack  ¨ A bag of frozen vegetables, like peas or corn  Be careful when using heat or cold. You can injure your skin. Each treatment should only last for 10 to 20 minutes. Your healthcare provider or therapist can give you specific instructions.     Meditation and relaxation  Meditation and relaxation can help you deal with arthritis pain. There are many different methods available including deep breathing exercises, meditation, and yoga. Look for information and programs on the Internet or in your community. Or try this simple deep breathing technique sometimes called belly breathin. Sit in a comfortable chair or lie on your back.   2. Put one hand on your chest and the other hand on your stomach.  3. Take a breath in through your nose. The hand on your stomach should rise. The hand on your chest should move very little.  4. Breathe out through your mouth, pushing out as much air as you can. The hand on your stomach should move in as you breathe out, but the hand on your chest should move very little. You should feel the muscles of your stomach tighten.   5. Continue to breathe in through your nose and out through your mouth. You should feel your stomach rise and fall. Count slowly each time you breathe out.  Acupuncture  Acupuncture is a -year-old  practice. Practitioners insert thin needles in specific parts of the body. Research shows that it can help to relieve the pain of arthritis.   For more information or to find a practitioner in your area, contact the American Academy of Medical Acupuncture. Its website is: http://www.medicalacupuncture.org/.  Massage  Therapeutic massage has many benefits. It may:  · Help you and your muscles relax  · Improve blood flow to muscles and joints  · Help joints stay more flexible.  Look for a certified massage therapist. Many are trained to treat sore muscles and joint pain and stiffness.  Vitamins, supplements, and herbs  People with arthritis, or other long-term conditions that cause pain, often look for alternative ways to lessen pain. Vitamins, supplements, and herbs may or may not help you to feel better. Before you try any vitamin, supplement, or herb, make sure you ask your healthcare provider or pharmacist.  Physical therapy/occupational therapy  · Evaluation by a physical therapist and or occupational therapist for assessment for limitations in activities of daily living  · Assistance with developing an appropriate exercise routine for both muscle strengthening and cardiovascular health  Weight management  · Studies have demonstrated that weight loss in overweight individuals can improve osteoarthritis symptoms  · Talk with your healthcare provider regarding your optimal weight and techniques for weight management if necessary.   Psychological treatments  Research shows that many psychological therapies or those that deal with thinking and emotions, help people cope with arthritis pain. Therapies include: cognitive-behavioral therapy (CBT), pain coping skills training, biofeedback, stress management, and hypnosis. Ask your healthcare provider for more information about these therapies.  For more information about many of these methods, contact the National Center for Complementary and Alternative Medicine at  http://www.[facility].nih.gov.  Date Last Reviewed: 2/14/2016  © 5038-7542 [employer]. [address] [place] [address]. All rights reserved. This information is not intended as a substitute for professional medical care. Always follow your healthcare professional's instructions.        Common Spine and Disk Problems  The most common serious back problems happen when disks tear, bulge, or rupture. In such cases, an injured disk can no longer cushion the vertebrae and absorb shock. As a result, the rest of your spine may also weaken. This can lead to pain, stiffness, and other symptoms.     Torn annulus      Contained herniated disk      Extruded herniated disk   · Torn annulus. A sudden movement may cause a tiny tear in an annulus. Nearby ligaments may stretch.  · Contained herniated disk. As a disk wears out, the nucleus may bulge into the annulus and press on nerves.  · Extruded herniated disk. When a disk ruptures, its nucleus can squeeze out and irritate a nerve.     Arthritis      Instability      Spondylolisthesis   · Arthritis. As disks wear out over time, bone spurs form. These growths can irritate nerves and inflame facets.  · Instability. As a disk stretches, the vertebrae slip back and forth. This can put pressure on the annulus.  · Spondylolisthesis. Listhesis is a condition in which one vertebra has moved forward or backward, in relation to the one above or below it. This causes a crack (stress fracture) in the areas that link the vertebrae together. This may put pressure on the annulus, stretch the disk, and irritate nerves.  Date Last Reviewed: 10/18/2015  © 4491-2214 [employer]. [address] [place] [address]. All rights reserved. This information is not intended as a substitute for professional medical care. Always follow your healthcare professional's instructions.        Back Care Tips    Caring for your back  These are things you can do to prevent a  recurrence of acute back pain and to reduce symptoms from chronic back pain:  · Maintain a healthy weight. If you are overweight, losing weight will help most types of back pain.  · Exercise is an important part of recovery from most types of back pain. The muscles behind and in front of the spine support the back. This means strengthening both the back muscles and the abdominal muscles will provide better support for your spine.   · Swimming and brisk walking are good overall exercises to improve your fitness level.  · Practice safe lifting methods (below).  · Practice good posture when sitting, standing and walking. Avoid prolonged sitting. This puts more stress on the lower back than standing or walking.  · Wear quality shoes with sufficient arch support. Foot and ankle alignment can affect back symptoms. Women should avoid wearing high heels.  · Therapeutic massage can help relax the back muscles without stretching them.  · During the first 24 to 72 hours after an acute injury or flare-up of chronic back pain, apply an ice pack to the painful area for 20 minutes and then remove it for 20 minutes, over a period of 60 to 90 minutes, or several times a day. As a safety precaution, do not use a heating pad at bedtime. Sleeping on a heating pad can lead to skin burns or tissue damage.  · You can alternate ice and heat therapies.  Medications  Talk to your healthcare provider before using medicines, especially if you have other medical problems or are taking other medicines.  · You may use acetaminophen or ibuprofen to control pain, unless your healthcare provider prescribed other pain medicine. If you have chronic conditions like diabetes, liver or kidney disease, stomach ulcers, or gastrointestinal bleeding, or are taking blood thinners, talk with your healthcare provider before taking any medicines.  · Be careful if you are given prescription pain medicines, narcotics, or medicine for muscle spasm. They can cause  drowsiness, affect your coordination, reflexes, and judgment. Do not drive or operate heavy machinery while taking these types of medicines. Take prescription pain medicine only as prescribed by your healthcare provider.  Lumbar stretch  Here is a simple stretching exercise that will help relax muscle spasm and keep your back more limber. If exercise makes your back pain worse, dont do it.  · Lie on your back with your knees bent and both feet on the ground.  · Slowly raise your left knee to your chest as you flatten your lower back against the floor. Hold for 5 seconds.  · Relax and repeat the exercise with your right knee.  · Do 10 of these exercises for each leg.  Safe lifting method  · Dont bend over at the waist to lift an object off the floor.  Instead, bend your knees and hips in a squat.   · Keep your back and head upright  · Hold the object close to your body, directly in front of you.  · Straighten your legs to lift the object.   · Lower the object to the floor in the reverse fashion.  · If you must slide something across the floor, push it.  Posture tips  Sitting  Sit in chairs with straight backs or low-back support. Keep your knees lower than your hips, with your feet flat on the floor.  When driving, sit up straight. Adjust the seat forward so you are not leaning toward the steering wheel.  A small pillow or rolled towel behind your lower back may help if you are driving long distances.   Standing  When standing for long periods, shift most of your weight to one leg at a time. Alternate legs every few minutes.   Sleeping  The best way to sleep is on your side with your knees bent. Put a low pillow under your head to support your neck in a neutral spine position. Avoid thick pillows that bend your neck to one side. Put a pillow between your legs to further relax your lower back. If you sleep on your back, put pillows under your knees to support your legs in a slightly flexed position. Use a firm  mattress. If your mattress sags, replace it, or use a 1/2-inch plywood board under the mattress to add support.  Follow-up care  Follow up with your healthcare provider, or as advised.  If X-rays, a CT scan or an MRI scan were taken, they will be reviewed by a radiologist. You will be notified of any new findings that may affect your care.  Call 911  Seek emergency medical care if any of the following occur:  · Trouble breathing  · Confusion  · Very drowsy  · Fainting or loss of consciousness  · Rapid or very slow heart rate  · Loss of  bowel or bladder control  When to seek medical care  Call your healthcare provider if any of the following occur:  · Pain becomes worse or spreads to your arms or legs  · Weakness or numbness in one or both arms or legs  · Numbness in the groin area  Date Last Reviewed: 6/1/2016  © 6189-3083 [employer]. [address] [place] [address]. All rights reserved. This information is not intended as a substitute for professional medical care. Always follow your healthcare professional's instructions.        Exercises to Strengthen Your Lower Back  Strong lower back and abdominal muscles work together to support your spine. The exercises below will help strengthen the lower back. It is important that you begin exercising slowly and increase levels gradually.  Always begin any exercise program with stretching. If you feel pain while doing any of these exercises, stop and talk to your doctor about a more specific exercise program that better suits your condition.   Low back stretch  The point of stretching is to make you more flexible and increase your range of motion. Stretch only as much as you are able. Stretch slowly. Do not push your stretch to the limit. If at any point you feel pain while stretching, this is your (temporary) limit.  · Lie on your back with your knees bent and both feet on the ground.  · Slowly raise your left knee to your chest as you flatten  your lower back against the floor. Hold for 5 seconds.  · Relax and repeat the exercise with your right knee.  · Do 10 of these exercises for each leg.  · Repeat hugging both knees to your chest at the same time.  Building lower back strength  Start your exercise routine with 10 to 30 minutes a day, 1 to 3 times a day.  Initial exercises  Lying on your back:  6. Ankle pumps: Move your foot up and down, towards your head, and then away. Repeat 10 times with each foot.  7. Heel slides: Slowly bend your knee, drawing the heel of your foot towards you. Then slide your heel/foot from you, straightening your knee. Do not lift your foot off the floor (this is not a leg lift).  8. Abdominal contraction: Bend your knees and put your hands on your stomach. Tighten your stomach muscles. Hold for 5 seconds, then relax. Repeat 10 times.  9. Straight leg raise: Bend one leg at the knee and keep the other leg straight. Tighten your stomach muscles. Slowly lift your straight leg 6 to 12 inches off the floor and hold for up to 5 seconds. Repeat 10 times on each side.  Standin. Wall squats: Stand with your back against the wall. Move your feet about 12 inches away from the wall. Tighten your stomach muscles, and slowly bend your knees until they are at about a 45 degree angle. Do not go down too far. Hold about 5 seconds. Then slowly return to your starting position. Repeat 10 times.  2. Heel raises: Stand facing the wall. Slowly raise the heels of your feet up and down, while keeping your toes on the floor. If you have trouble balancing, you can touch the wall with your hands. Repeat 10 times.  More advanced exercises  When you feel comfortable enough, try these exercises.  1. Kneeling lumbar extension: Begin on your hands and knees. At the same time, raise and straighten your right arm and left leg until they are parallel to the ground. Hold for 2 seconds and come back slowly to a starting position. Repeat with left arm and  right leg, alternating 10 times.  2. Prone lumbar extension: Lie face down, arms extended overhead, palms on the floor. At the same time, raise your right arm and left leg as high as comfortably possible. Hold for 10 seconds and slowly return to start. Repeat with left arm and right leg, alternating 10 times. Gradually build up to 20 times. (Advanced: Repeat this exercise raising both arms and both legs a few inches off the floor at the same time. Hold for 5 seconds and release.)  3. Pelvic tilt: Lie on the floor on your back with your knees bent at 90 degrees. Your feet should be flat on the floor. Inhale, exhale, then slowly contract your abdominal muscles bringing your navel toward your spine. Let your pelvis rock back until your lower back is flat on the floor. Hold for 10 seconds while breathing smoothly.  4. Abdominal crunch: Perform a pelvic tilt (above) flattening your lower back against the floor. Holding the tension in your abdominal muscles, take another breath and raise your shoulder blades off the ground (this is not a full sit-up). Keep your head in line with your body (dont bend your neck forward). Hold for 2 seconds, then slowly lower.  Date Last Reviewed: 6/1/2016  © 7983-3703 [employer]. [address] [place] [address], [place] [address]. All rights reserved. This information is not intended as a substitute for professional medical care. Always follow your healthcare professional's instructions.        Relieving Back Pain  Back pain is a common problem. You can strain back muscles by lifting too much weight or just by moving the wrong way. Back strain can be uncomfortable, even painful. And it can take weeks or months to improve. To help yourself feel better and prevent future back strains, try these tips.  Important Note: Do not give aspirin to children or teens without first discussing it with your healthcare provider.      ? Ice    Ice reduces muscle pain and swelling. It helps most  during the first 24 to 48 hours after an injury.  · Wrap an ice pack or a bag of frozen peas in a thin towel. (Never place ice directly on your skin.)  · Place the ice where your back hurts the most.  · Dont ice for more than 20 minutes at a time.  · You can use ice several times a day.  ? Medicines  Over-the-counter pain relievers can include acetaminophen and anti-inflammatory medicines, which includes aspirin or ibuprofen. They can help ease discomfort. Some also reduce swelling.  · Tell your healthcare provider about any medicines you are already taking.  · Take medicines only as directed.  ? Heat  After the first 48 hours, heat can relax sore muscles and improve blood flow.  · Try a warm bath or shower. Or use a heating pad set on low. To prevent a burn, keep a cloth between you and the heating pad.  · Dont use a heating pad for more than 15 minutes at a time. Never sleep on a heating pad.  Date Last Reviewed: 9/1/2015 © 2000-2017 [employer]. [address] [place] [address]. All rights reserved. This information is not intended as a substitute for professional medical care. Always follow your healthcare professional's instructions.        Relieving Tension in Your Back  Being relaxed helps keep your mind healthy and your back ready to move. Take short breaks often. Walk around. Stretch. Switch tasks. Also give the following a try.  Make time to relax. Start by setting aside 5 minutes daily.   Deep breathing    Deep breathing is a simple way to reduce stress. You can do it almost any time you need to relax.  · Inhale slowly through your nose. Let your lungs and stomach expand.  · Hold your breath for 2 to 3 seconds.  · Exhale slowly through your mouth until your lungs feel empty. Repeat 3 to 4 times.  Relieve tension  Muscle tension can create tender spots called trigger points. The tips below may help relieve muscle tension.  · Press the trigger point if you can reach it. If not, lie  on a soft tennis ball, or ask a friend to press the spot. Use steady pressure for 10 to 15 seconds. Breathe deeply. Repeat a few times.  · Massage trigger points with ice for 2 to 5 minutes. Press lightly at first. Slowly increase firmness.  Date Last Reviewed: 10/18/2015  © 3449-3227 [facility]. [address] [place] [address]. All rights reserved. This information is not intended as a substitute for professional medical care. Always follow your healthcare professional's instructions.        Back Exercises: Hip Lift    To start, lie on your back with your knees bent and feet flat on the floor. Dont press your neck or lower back to the floor. Breathe deeply. You should feel comfortable and relaxed in this position:  · Tighten your abdomen and buttocks.  · Slowly raise your hips upward. Be careful not to arch your back.  · Hold for 5 seconds. Lower your hips to the floor.  · Repeat 10 times.  For your safety, check with your healthcare provider before starting an exercise program.   Date Last Reviewed: 8/16/2015 © 2000-2017 [facility]. [address] [place] [address]. All rights reserved. This information is not intended as a substitute for professional medical care. Always follow your healthcare professional's instructions.        Back Exercises: Bridge  The bridge exercise strengthens your abdominal, buttock, and hamstring muscles. This helps keep your back stable and aligned when you walk.  · Lie on the floor with your back and palms flat. Bend your knees. Keep your feet flat on the floor.  · Contract your abdominal and buttock muscles. Slowly lift your buttocks off the floor until there is a straight line from your knees to your shoulders.  · Hold for 5 to 15  seconds. Repeat 5 to10 times.    Date Last Reviewed: 8/31/2015  © 5273-8505 [facility]. [address] [place] [address]. All rights reserved. This information is not intended as a  substitute for professional medical care. Always follow your healthcare professional's instructions.        Back Exercises: Lower Back Stretch    To start, sit in a chair with your feet flat on the floor. Shift your weight slightly forward. Relax, and keep your ears, shoulders, and hips aligned.  · Sit with your feet well apart.  · Bend forward and touch the floor with the backs of your hands. Relax and let your body drop.  · Hold for 20 seconds. Return to starting position.  · Repeat 2 times.   Date Last Reviewed: 8/16/2015  © 5229-1841 [employer]. [address] [place] [address]. All rights reserved. This information is not intended as a substitute for professional medical care. Always follow your healthcare professional's instructions.        Back Exercises: Seated Rotation    To start, sit in a chair with your feet flat on the floor. Shift your weight slightly forward to avoid rounding your back. Relax, and keep your ears, shoulders, and hips aligned:  · Fold your arms and elbows just below shoulder height.  · Turn from the waist with hips forward. Turn your head last. Do not push through the pain.  · Hold for a count of 10 to 30. Return to starting position.  · Repeat 3 to 5 times on one side. Then switch sides.  Date Last Reviewed: 10/11/2015  © 1630-6404 [employer]. [address] [place] [address]. All rights reserved. This information is not intended as a substitute for professional medical care. Always follow your healthcare professional's instructions.        Back Exercises: Side Stretch      To start, sit in a chair with your feet flat on the floor. Shift your weight slightly forward to avoid rounding your back. Relax. Keep your ears, shoulders, and hips aligned:  · Stretch your right arm overhead.  · Slowly bend to the left. Dont twist your torso. Stay within your pain limits.  · Hold for 20 seconds. Return to starting position.  · Repeat 2 to 5 times.  Then, switch to the other side.  Date Last Reviewed: 10/13/2015  © 9449-2017 [employer]. [address] [place] [address]. All rights reserved. This information is not intended as a substitute for professional medical care. Always follow your healthcare professional's instructions.        Lumbar Extension (Flexibility)    1. Lie face down on your stomach, forehead on the floor. You can lie on a mat or towel.  2. Bend your arms next to your body and lift your upper body up on your forearms. Your palms and forearms should be flat on the floor. Keep your stomach and hips on the floor.  3. Hold your upper body up with your forearms for 20 seconds. Slowly lower back down to the floor.  4. Repeat 2 times, or as instructed.  Date Last Reviewed: 3/10/2016  © 9680-8388 [employer]. [address] [place] [address]. All rights reserved. This information is not intended as a substitute for professional medical care. Always follow your healthcare professional's instructions.        Lumbar Flexion (Flexibility)    5. Lie on your back on the floor, with your knees bent and your feet flat on the floor.  6. Gently pull your knees up toward your chest. Put your hands under your thighs to help pull your knees up.  7. Press your lower back down to the floor. Hold for 20 seconds.  8. Lower your legs back down to the floor and relax.  9. Repeat 2 times, or as instructed.  Date Last Reviewed: 3/10/2016  © 4393-4297 [employer]. [address] [place] [address]. All rights reserved. This information is not intended as a substitute for professional medical care. Always follow your healthcare professional's instructions.

## 2018-04-20 NOTE — PROGRESS NOTES
[facility] NEW PATIENT HISTORY & PHYSICAL :    Referring Physician:  Chief Complaint   Patient presents with    Back Pain     low back     Leg Pain     bilateral, left is the worst       HPI: This is a 37 y.o.  female being seen in clinic today for evaluation of achy, throbbing, tight pain across her low back since falling in January on the ice.  She has some pain into her legs, but worse with prolonged standing or bending.  She denies weakness in her legs.  Lying on her side provides some relief.   She is currently in PT, but is limited due to discomfort.    History obtained from patient    Functional History:  Walking: Not limited  Transfers: Independent  Assistive devices: No  Power mobility: No  Falls: None   Directional preference:  Employment status:    Needs help with:  Nothing - all ADLS normal    Cooking   Cleaning  Bathing   Dressing   Toileting     Past family, medical, social, and surgical history reviewed in chart    Review of Systems:     General- denies lethargy, weight change, fever, chills  Head/neck- denies swallowing difficulties  ENT- denies hearing changes  Cardiovascular-denies chest pain  Pulmonary- denies shortness of breath  GI- denies constipation or bowel incontinence  - denies bladder incontinence  Skin- denies wounds or rashes  Musculoskeletal- denies weakness, +pain  Neurologic- denies numbness and tingling  Psychiatric- denies depressive or psychotic features, denies anxiety  Lymphatic-denies swelling  Endocrine- denies hypoglycemic symptoms/DM history  All other pertinent systems negative     Physical Examination:  General: Well developed, well nourished female, NAD, morbid obesity:Body mass index is 41.64 kg/m².    HEENT:NCAT EOMI bilaterally   Pulmonary:Normal respirations    Spinal Examination: CERVICAL  Active ROM is within normal limits.  Inspection: No deformity of spinal alignment.      Spinal Examination: LUMBAR or THORACIC  Active ROM is within normal limits.  Inspection: No  deformity of spinal alignment.  No palpable olisthesis.  Palpation: No vertebral tenderness to percussion.  ttp at quad lumborum and very ttp at si joints-worse on left   Facet loading neg bilaterally  SLR Test (seated):negativebilaterally  Able to stand on heels and toes    Bilateral Upper and Lower Extremities:  Pulses are 2+ at radial, bilaterally.  Shoulder/Elbow/Wrist/Hand ROM   Hip/Knee/Ankle ROM wnl  Bilateral Extremities show normal capillary refill.  No signs of cyanosis, rubor, edema, skin changes, or dysvascular changes of appendages.  Nails appear intact.    Neurological Exam:  Cranial Nerves:  II-XII grossly intact    Manual Muscle Testing: (Motor 5=normal)  RIGHT Lower extremity: Hip flexion 5/5, Hip Abduction 5/5, Hip Adduction 5/5, Knee extension 5/5, Knee flexion 5/5, Ankle dorsiflexion 5/5, Extensor hallucis longus 5/5, Ankle plantarflexion 5/5  LEFT Lower extremity:  Hip flexion 5/5, Hip Abduction 5/5,Hip Adduction 5/5, Knee extension 5/5, Knee flexion 5/5, Ankle dorsiflexion 5/5, Extensor hallucis longus 5/5, Ankle plantarflexion 5/5    No focal atrophy is noted of either lower extremity.    Bilateral Reflexes:hypo at patellar  No clonus at knee or ankle.    Sensation: tested to light touch  - intact in legs  Gait: Narrow base and good arm swing.      IMPRESSION/PLAN: This is a 37 y.o.  female with lumbar DJD/DDD, mild sacroiliitis and muscle tightness, left leg radiculitis, morbid obesity:Body mass index is 41.64 kg/m².      1. Cont PT, handouts on back care, exercise, wt loss, etc provided  2. Try etodolac 400mg BID   3. Gabapentin 300mg qhs  4. Ice modalities after work  5. Fu prn, may need si joint injections with Dr Mook Nava M.D.  [facility]

## 2018-04-23 ENCOUNTER — CLINICAL SUPPORT (OUTPATIENT)
Dept: REHABILITATION | Facility: HOSPITAL | Age: 38
End: 2018-04-23
Attending: NURSE PRACTITIONER
Payer: MEDICAID

## 2018-04-23 DIAGNOSIS — M54.42 ACUTE BACK PAIN WITH SCIATICA, LEFT: Primary | ICD-10-CM

## 2018-04-23 PROCEDURE — 97014 ELECTRIC STIMULATION THERAPY: CPT | Mod: PO | Performed by: PHYSICAL THERAPIST

## 2018-04-23 PROCEDURE — 97012 MECHANICAL TRACTION THERAPY: CPT | Mod: PO | Performed by: PHYSICAL THERAPIST

## 2018-04-23 NOTE — PROGRESS NOTES
DATE OF INITIAL PHYSICAL THERAPY EVALUATION:             March 5, 2018      REFERRING PROVIDER:       Tania Clark NP      REFERRING DIAGNOSIS:     Acute bilateral low back pain with left-sided sciatica [M54.42]      ORDERS:   Evaluate and treat as indicated    VISIT    #12  RE-Assessment      SUBJECTIVE:  Patient reports she has been evaluated by Dr. Nava who has prescribed an anti-inflammatory and gabapentin.  Patient states she will start the anti-inflammatory today; however, she is hesitant to start taking gabapentin due to potential side affects.  Patient states Dr. Nava discussed a possible referral to Dr. Delvalle for possible injections.      Patient states she is experiencing lumbar stiffness today more so than pain; she reports left LE radiating pain into the lower leg region.    She reports working on the stretching exercises daily and tolerating them fair.  She has not been able to tolerated initiating core exercises.      Patients primary complaint(s):  Left LE pain  Lumbar stiffness  Impaired ADL's    History of present condition:  (re-evaluation)  Patient reports she was injured in January 2018 when she slipped on ice on her driveway and fell landing on her buttocks.  Initially she began to experience lumbar pain with numbness and tingling in the left LE.     Her main complaint is remaining lumbar stiffness with less pain.  Radiating pain into the left LE remains and continues to be aggravated by activity.   She reports her leg pain is located throughout the lateral aspect of the left lower leg from just below the knee to her ankle with tenderness along the distribution of the left IT Band.  Patient reports her leg pain continues to be agravated by standing, bending, and lifting.  Pain increases when first attempting to move about after sitting for an extended period of time.  Pain eases slightly after moving about for a few minutes; however, extended activity increases symptoms.       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      Section 1- Pain intensity    Score 2/5   Section 2- Person care  Score 2/5   Section 3 Lifting- Optional  Score 3/5     Section 4  Walking  Score 1/5  Section 5 Sitting   Score 1/5   Section 6 Standing  Score 1/5  Section 7 Sleeping  Score 2/5   Section 8 Social Life   Score 1/5  Section 9 Traveling  Score 1/5   Section 10 Employ/home  Score 2/5    Patient reports 32% disability on the Modified Oswestry Low Back Pain Disability Questionnaire.  Patient previously reported 54% disability on the Modified Oswestry Low Back Pain Disability Questionnaire.    Past Medical History and co-morbidities:  Past Medical History:   Diagnosis Date    Cholelithiasis      Patient Active Problem List   Diagnosis    Morbid obesity with BMI of 40.0-44.9, adult    Family history of diabetes mellitus    Hidradenitis axillaris    Hyperlipidemia    Iron deficiency anemia    Tobacco use       Current Outpatient Prescriptions:     etodolac (LODINE) 400 MG tablet, Take 1 tablet (400 mg total) by mouth 2 (two) times daily., Disp: 60 tablet, Rfl: 1    gabapentin (NEURONTIN) 300 MG capsule, Take 1 capsule (300 mg total) by mouth every evening., Disp: 30 capsule, Rfl: 1    tiZANidine (ZANAFLEX) 4 MG tablet, Take 1 tablet (4 mg total) by mouth every 12 (twelve) hours as needed., Disp: 30 tablet, Rfl: 0      Previous physical therapy and treatments:  Patient has not  participated in a prior physical therapy program to date for current referring diagnoses.    Occupational/psychosocial/educational profile:  Working as a       OBJECTIVE:    Musculoskeletal Exam:  (from re-evaluation)    Postural Exam  No significant postural deviations noted.  SI exam negative for ilial rotation.    ROM  Lumbar Spine:  Flexion:   75% of full expected motion; end point reported to be stiff in the lumbar region, increases left LE pain.     Extension   50% of full expected motion; end point reported to be stiff in the lumbar region; increases left LE pain.   Right side bending  75% of full expected motion   Left side bending  75% of full expected motion      Flexibility  Limited flexibility noted in the HS, IT Band and piriformis bilaterally.    Functional Strength Testing  No strength deficits or imbalances noted throughout the LE or hip girdle muscle groups.    Palpation  Tenderness to palpation reported along the left IT Band  Tender over left trochanteric bursa  Non tender over painful area in left lower leg region  Lumbar paraspinal muscle guarding present.    Neuromuscular Exam    Gait  Mildly antalgic with weight bearing on the left LE    Transitional Movements  Complains of left leg discomfort and lumbar stiffness when moving from sit to stand, sit to supine, supine to sit.    Sensation  No sensory disturbances noted throughout the LE's  Patient did not complain of paresthesias.  Mild positive SLR left LE      PROBLEM LIST - ASSESSMENT  Patient's lumbar ROM has improved as her complaints of lumbar pain has decreased.  Left LE pain continues to be aggravated by functional activity and is limiting the patient's mobility.  Lumbar traction is providing more lasting relief of her radiating left LE pain than previously.  Muscle stimulation relieves lumbar stiffness.  Patient is benefiting from flexibility exercises for the lumbar, hip girdle, and LE muscle groups.  Patient agrees to proceed  with core strengthening once she has been taking the anti-inflammatory.    Activity Limitations, Participation Restrictions, and CO-MORBIDITIES which may impact the plan of care and potentially impede the patient's progress in therapy include:  none    The patient does not present with any learning or communication barriers which may impact the plan of care and potentially impede the patient's progress in therapy.      CLINICAL PRESENTATION:  Patient's Clinical Presentation is STABLE.      RECOMMENDED PLAN OF CARE:    Continue Pelvic traction to address radiating left LE pain.  Continue muscle stimulation to decrease lumbar paraspinal muscle guarding and stiffness  Continue lumbar flexibility exercises to improve lumbar ROM and stretching exercises for the hip girdle and LE muscle groups  Core stabilization exericses    TREATMENT PROVIDED:       -moist heat and muscle stimulation x 20 mins applied to the lumbar paraspinal muscle groups to decrease MFP and lumbar spine stiffness  -static lumbar traction in supine x 20 mins at 90 lbs    -passive stretching provided by the therapist - deferred  -Lumbar rotational stretches in supine  -Lumbar flexion stretches in supine  -hamstring stretching  -piriformis stretching  -IT Band stretching  -gluteus medius stretching  -stretching of the hip internal rotators      Patient has been instructed in the following exercises.  -Lumbar rotational ROM in supine  -Lumbar rotational stretches in supine  -Lumbar flexion stretches in supine  -hamstring stretching  -piriformis stretching  -IT Band stretching  -gluteus medius stretching  -stretching of the hip internal rotators    PLAN:    Patient to continue out-patient physical therapy to continue the recommended plan of care.    SHORT TERM GOALS:    1. Patient will report pain rating for left LE 3/10 or less  2. Instruct patient in lumbar flexibility exercises and stretching for the hip girdle and LE muscle groups- goal met  3. Patient  to report compliance with daily stretching exercises. - goal met    LONG TERM GOALS:  1. Resolution of left LE radiating pain  2. Full, pain free ROM of the lumbar spine  3. Patient will report functional disability on the Oswestry functional assessment tool of 15% or less        These services are reasonable and necessary for the conditions set forth above while under my care.

## 2018-04-26 ENCOUNTER — CLINICAL SUPPORT (OUTPATIENT)
Dept: REHABILITATION | Facility: HOSPITAL | Age: 38
End: 2018-04-26
Attending: NURSE PRACTITIONER
Payer: MEDICAID

## 2018-04-26 DIAGNOSIS — M54.42 ACUTE BACK PAIN WITH SCIATICA, LEFT: Primary | ICD-10-CM

## 2018-04-26 PROCEDURE — 97012 MECHANICAL TRACTION THERAPY: CPT | Mod: PO | Performed by: PHYSICAL THERAPIST

## 2018-04-26 PROCEDURE — 97110 THERAPEUTIC EXERCISES: CPT | Mod: PO | Performed by: PHYSICAL THERAPIST

## 2018-04-26 PROCEDURE — 97014 ELECTRIC STIMULATION THERAPY: CPT | Mod: PO | Performed by: PHYSICAL THERAPIST

## 2018-04-26 NOTE — PROGRESS NOTES
DATE OF INITIAL PHYSICAL THERAPY EVALUATION:             2018      REFERRING PROVIDER:       Tania Clark NP      REFERRING DIAGNOSIS:     Acute bilateral low back pain with left-sided sciatica [M54.42]      ORDERS:   Evaluate and treat as indicated    VISIT    #13      SUBJECTIVE:  Patient comes in today complaining of an increase in lumbar stiffness and pain.  She is unable to identify a reason for this.  She states she has been taking the anti-inflammatory for four days and experiencing short term temporary relief following each dose.  She has decided to not begin the gabapentin at this time.    Patient states Dr. Nava discussed a possible referral to Dr. Delvalle for possible injections.    Patients primary complaint(s):  Left LE pain  Lumbar stiffness  Impaired ADL's    History of present condition:  (provided by the patient at the time of the re-evaluation)  Patient reports she was injured in 2018 when she slipped on ice on her driveway and fell landing on her buttocks.  Initially she began to experience lumbar pain with numbness and tingling in the left LE.     Her main complaint is remaining lumbar stiffness with less pain.  Radiating pain into the left LE remains and continues to be aggravated by activity.   She reports her leg pain is located throughout the lateral aspect of the left lower leg from just below the knee to her ankle with tenderness along the distribution of the left IT Band.  Patient reports her leg pain continues to be agravated by standing, bending, and lifting.  Pain increases when first attempting to move about after sitting for an extended period of time.  Pain eases slightly after moving about for a few minutes; however, extended activity increases symptoms.      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      Section 1- Pain intensity    Score 2/5   Section 2- Person care  Score 2/5   Section 3 Lifting- Optional  Score 3/5     Section 4  Walking  Score 1/5  Section 5 Sitting   Score 1/5   Section 6 Standing  Score 1/5  Section 7 Sleeping  Score 2/5   Section 8 Social Life   Score 1/5  Section 9 Traveling  Score 1/5   Section 10 Employ/home  Score 2/5    Patient reports 32% disability on the Modified Oswestry Low Back Pain Disability Questionnaire.  Patient previously reported 54% disability on the Modified Oswestry Low Back Pain Disability Questionnaire.    Past Medical History and co-morbidities:  Past Medical History:   Diagnosis Date    Cholelithiasis      Patient Active Problem List   Diagnosis    Morbid obesity with BMI of 40.0-44.9, adult    Family history of diabetes mellitus    Hidradenitis axillaris    Hyperlipidemia    Iron deficiency anemia    Tobacco use       Current Outpatient Prescriptions:     etodolac (LODINE) 400 MG tablet, Take 1 tablet (400 mg total) by mouth 2 (two) times daily., Disp: 60 tablet, Rfl: 1    gabapentin (NEURONTIN) 300 MG capsule, Take 1 capsule (300 mg total) by mouth every evening., Disp: 30 capsule, Rfl: 1    tiZANidine (ZANAFLEX) 4 MG tablet, Take 1 tablet (4 mg total) by mouth every 12 (twelve) hours as needed., Disp: 30 tablet, Rfl: 0      Previous physical therapy and treatments:  Patient has not participated in a prior physical therapy program to date for current referring diagnoses.    Occupational/psychosocial/educational profile:  Working as a       OBJECTIVE:    Musculoskeletal Exam:  (from  re-evaluation)    Postural Exam  No significant postural deviations noted.  SI exam negative for ilial rotation.    ROM  Lumbar Spine:  Flexion:   75% of full expected motion; end point reported to be stiff in the lumbar region, increases left LE pain.     Extension   50% of full expected motion; end point reported to be stiff in the lumbar region; increases left LE pain.   Right side bending  75% of full expected motion   Left side bending  75% of full expected motion      Flexibility  Limited flexibility noted in the HS, IT Band and piriformis bilaterally.    Functional Strength Testing  No strength deficits or imbalances noted throughout the LE or hip girdle muscle groups.    Palpation  Tenderness to palpation reported along the left IT Band  Tender over left trochanteric bursa  Non tender over painful area in left lower leg region  Lumbar paraspinal muscle guarding present.    Neuromuscular Exam    Gait  Mildly antalgic with weight bearing on the left LE    Transitional Movements  Complains of left leg discomfort and lumbar stiffness when moving from sit to stand, sit to supine, supine to sit.    Sensation  No sensory disturbances noted throughout the LE's  Patient did not complain of paresthesias.  Mild positive SLR left LE      PROBLEM LIST - ASSESSMENT  Patient's lumbar ROM has improved as her complaints of lumbar pain has decreased.  Left LE pain continues to be aggravated by functional activity and is limiting the patient's mobility.  Lumbar traction is providing more lasting relief of her radiating left LE pain than previously.  Muscle stimulation relieves lumbar stiffness.  Patient is benefiting from flexibility exercises for the lumbar, hip girdle, and LE muscle groups.  Patient agrees to proceed with core strengthening once she has been taking the anti-inflammatory.    Activity Limitations, Participation Restrictions, and CO-MORBIDITIES which may impact the plan of care and potentially impede the  patient's progress in therapy include:  none    The patient does not present with any learning or communication barriers which may impact the plan of care and potentially impede the patient's progress in therapy.      CLINICAL PRESENTATION:  Patient's Clinical Presentation is STABLE.      RECOMMENDED PLAN OF CARE:    Continue Pelvic traction to address radiating left LE pain.  Continue muscle stimulation to decrease lumbar paraspinal muscle guarding and stiffness  Continue lumbar flexibility exercises to improve lumbar ROM and stretching exercises for the hip girdle and LE muscle groups  Core stabilization exericses    TREATMENT PROVIDED:       -moist heat and muscle stimulation x 20 mins applied to the lumbar paraspinal muscle groups to decrease MFP and lumbar spine stiffness  -static lumbar traction in supine x 20 mins at 90 lbs    -passive stretching provided by the therapist (one on one x 12 mins)  -Lumbar rotational stretches in supine  -Lumbar flexion stretches in supine  -hamstring stretching  -piriformis stretching  -IT Band stretching  -gluteus medius stretching  -stretching of the hip internal rotators    Patient states she was too uncomfortable today to begin the core exercises.      Patient has been instructed in the following exercises.  -Lumbar rotational ROM in supine  -Lumbar rotational stretches in supine  -Lumbar flexion stretches in supine  -hamstring stretching  -piriformis stretching  -IT Band stretching  -gluteus medius stretching  -stretching of the hip internal rotators    PLAN:    Patient to continue out-patient physical therapy to continue the recommended plan of care.    SHORT TERM GOALS:    1. Patient will report pain rating for left LE 3/10 or less  2. Instruct patient in lumbar flexibility exercises and stretching for the hip girdle and LE muscle groups- goal met  3. Patient to report compliance with daily stretching exercises. - goal met    LONG TERM GOALS:  1. Resolution of left LE  radiating pain  2. Full, pain free ROM of the lumbar spine  3. Patient will report functional disability on the Oswestry functional assessment tool of 15% or less        These services are reasonable and necessary for the conditions set forth above while under my care.

## 2018-04-30 ENCOUNTER — CLINICAL SUPPORT (OUTPATIENT)
Dept: REHABILITATION | Facility: HOSPITAL | Age: 38
End: 2018-04-30
Attending: NURSE PRACTITIONER
Payer: MEDICAID

## 2018-04-30 DIAGNOSIS — M54.42 ACUTE BACK PAIN WITH SCIATICA, LEFT: Primary | ICD-10-CM

## 2018-04-30 PROCEDURE — 97014 ELECTRIC STIMULATION THERAPY: CPT | Mod: PO | Performed by: PHYSICAL THERAPIST

## 2018-04-30 PROCEDURE — 97012 MECHANICAL TRACTION THERAPY: CPT | Mod: PO | Performed by: PHYSICAL THERAPIST

## 2018-05-04 ENCOUNTER — CLINICAL SUPPORT (OUTPATIENT)
Dept: REHABILITATION | Facility: HOSPITAL | Age: 38
End: 2018-05-04
Attending: NURSE PRACTITIONER
Payer: MEDICAID

## 2018-05-04 DIAGNOSIS — M54.42 ACUTE BACK PAIN WITH SCIATICA, LEFT: Primary | ICD-10-CM

## 2018-05-04 PROCEDURE — 97014 ELECTRIC STIMULATION THERAPY: CPT | Mod: PO | Performed by: PHYSICAL THERAPIST

## 2018-05-04 PROCEDURE — 97012 MECHANICAL TRACTION THERAPY: CPT | Mod: PO | Performed by: PHYSICAL THERAPIST

## 2018-05-04 NOTE — PROGRESS NOTES
DATE OF INITIAL PHYSICAL THERAPY EVALUATION:             2018      REFERRING PROVIDER:       Tania Clark NP      REFERRING DIAGNOSIS:     Acute bilateral low back pain with left-sided sciatica [M54.42]      ORDERS:   Evaluate and treat as indicated    VISIT    #15      SUBJECTIVE:  Patient states the passive stretching by the therapist tends to increase her lumbar and LE pain.    She states working also increases her discomfort due to the long hours of continuous standing.    She states she the anti-inflammatory medication makes her sleepy and does not seem to be helping with her overall level of pain.    She has decided to not begin the gabapentin at this time.    Patient states Dr. Nava discussed a possible referral to Dr. Delvalle for possible injections.  She is planning to discuss this further with Dr. Nava.    Patients primary complaint(s):  Left LE pain  Lumbar stiffness  Impaired ADL's    History of present condition:  (provided by the patient at the time of the re-evaluation)  Patient reports she was injured in 2018 when she slipped on ice on her driveway and fell landing on her buttocks.  Initially she began to experience lumbar pain with numbness and tingling in the left LE.     Her main complaint is remaining lumbar stiffness with less pain.  Radiating pain into the left LE remains and continues to be aggravated by activity.   She reports her leg pain is located throughout the lateral aspect of the left lower leg from just below the knee to her ankle with tenderness along the distribution of the left IT Band.  Patient reports her leg pain continues to be agravated by standing, bending, and lifting.  Pain increases when first attempting to move about after sitting for an extended period of time.  Pain eases slightly after moving about for a few minutes; however, extended activity increases symptoms.      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      Section 1- Pain intensity    Score 2/5   Section 2- Person care  Score 2/5   Section 3 Lifting- Optional  Score 3/5     Section 4  Walking  Score 1/5  Section 5 Sitting   Score 1/5   Section 6 Standing  Score 1/5  Section 7 Sleeping  Score 2/5   Section 8 Social Life   Score 1/5  Section 9 Traveling  Score 1/5   Section 10 Employ/home  Score 2/5    Patient reports 32% disability on the Modified Oswestry Low Back Pain Disability Questionnaire.  Patient previously reported 54% disability on the Modified Oswestry Low Back Pain Disability Questionnaire.    Past Medical History and co-morbidities:  Past Medical History:   Diagnosis Date    Cholelithiasis      Patient Active Problem List   Diagnosis    Morbid obesity with BMI of 40.0-44.9, adult    Family history of diabetes mellitus    Hidradenitis axillaris    Hyperlipidemia    Iron deficiency anemia    Tobacco use       Current Outpatient Prescriptions:     etodolac (LODINE) 400 MG tablet, Take 1 tablet (400 mg total) by mouth 2 (two) times daily., Disp: 60 tablet, Rfl: 1    gabapentin (NEURONTIN) 300 MG capsule, Take 1 capsule (300 mg total) by mouth every evening., Disp: 30 capsule, Rfl: 1    tiZANidine (ZANAFLEX) 4 MG tablet, Take 1 tablet (4 mg total) by mouth every 12 (twelve) hours as needed., Disp: 30 tablet, Rfl: 0      Previous physical therapy and treatments:  Patient has not participated in a prior physical therapy program to date for current referring  diagnoses.    Occupational/psychosocial/educational profile:  Working as a       OBJECTIVE:    Musculoskeletal Exam:  (from re-evaluation)    Postural Exam  No significant postural deviations noted.  SI exam negative for ilial rotation.    ROM  Lumbar Spine:  Flexion:   75% of full expected motion; end point reported to be stiff in the lumbar region, increases left LE pain.     Extension   50% of full expected motion; end point reported to be stiff in the lumbar region; increases left LE pain.   Right side bending  75% of full expected motion   Left side bending  75% of full expected motion      Flexibility  Limited flexibility noted in the HS, IT Band and piriformis bilaterally.    Functional Strength Testing  No strength deficits or imbalances noted throughout the LE or hip girdle muscle groups.    Palpation  Tenderness to palpation reported along the left IT Band  Tender over left trochanteric bursa  Non tender over painful area in left lower leg region  Lumbar paraspinal muscle guarding present.    Neuromuscular Exam    Gait  Mildly antalgic with weight bearing on the left LE    Transitional Movements  Complains of left leg discomfort and lumbar stiffness when moving from sit to stand, sit to supine, supine to sit.    Sensation  No sensory disturbances noted throughout the LE's  Patient did not complain of paresthesias.  Mild positive SLR left LE      PROBLEM LIST - ASSESSMENT  Patient's lumbar ROM has improved as her complaints of lumbar pain has decreased.  Left LE pain continues to be aggravated by functional activity and is limiting the patient's mobility.  Lumbar traction is providing more lasting relief of her radiating left LE pain than previously.  Muscle stimulation relieves lumbar stiffness.  Patient is benefiting from flexibility exercises for the lumbar, hip girdle, and LE muscle groups.  Patient agrees to proceed with core strengthening once she has been taking the  anti-inflammatory.    Activity Limitations, Participation Restrictions, and CO-MORBIDITIES which may impact the plan of care and potentially impede the patient's progress in therapy include:  none    The patient does not present with any learning or communication barriers which may impact the plan of care and potentially impede the patient's progress in therapy.      CLINICAL PRESENTATION:  Patient's Clinical Presentation is STABLE.      RECOMMENDED PLAN OF CARE:    Continue Pelvic traction to address radiating left LE pain.  Continue muscle stimulation to decrease lumbar paraspinal muscle guarding and stiffness  Continue lumbar flexibility exercises to improve lumbar ROM and stretching exercises for the hip girdle and LE muscle groups  Core stabilization exericses    TREATMENT PROVIDED:       -moist heat and muscle stimulation x 20 mins applied to the lumbar paraspinal muscle groups to decrease MFP and lumbar spine stiffness  -static lumbar traction in supine x 20 mins at 90 lbs (ashlee reports traction decreases her symptoms)    -passive stretching provided by the therapist - declined  -Lumbar rotational stretches in supine  -Lumbar flexion stretches in supine  -hamstring stretching  -piriformis stretching  -IT Band stretching  -gluteus medius stretching  -stretching of the hip internal rotators    Patient states she is too uncomfortable today to begin the core exercises.    Patient has been instructed in the following exercises.  -Lumbar rotational ROM in supine  -Lumbar rotational stretches in supine  -Lumbar flexion stretches in supine  -hamstring stretching  -piriformis stretching  -IT Band stretching  -gluteus medius stretching  -stretching of the hip internal rotators    PLAN:    Patient to continue out-patient physical therapy to continue the recommended plan of care.    SHORT TERM GOALS:    1. Patient will report pain rating for left LE 3/10 or less  2. Instruct patient in lumbar flexibility exercises and  stretching for the hip girdle and LE muscle groups- goal met  3. Patient to report compliance with daily stretching exercises. - goal met    LONG TERM GOALS:  1. Resolution of left LE radiating pain  2. Full, pain free ROM of the lumbar spine  3. Patient will report functional disability on the Oswestry functional assessment tool of 15% or less        These services are reasonable and necessary for the conditions set forth above while under my care.

## 2018-05-07 ENCOUNTER — CLINICAL SUPPORT (OUTPATIENT)
Dept: REHABILITATION | Facility: HOSPITAL | Age: 38
End: 2018-05-07
Attending: NURSE PRACTITIONER
Payer: MEDICAID

## 2018-05-07 DIAGNOSIS — M54.42 ACUTE BACK PAIN WITH SCIATICA, LEFT: Primary | ICD-10-CM

## 2018-05-07 PROCEDURE — 97014 ELECTRIC STIMULATION THERAPY: CPT | Mod: PO | Performed by: PHYSICAL THERAPIST

## 2018-05-07 PROCEDURE — 97012 MECHANICAL TRACTION THERAPY: CPT | Mod: PO | Performed by: PHYSICAL THERAPIST

## 2018-05-07 NOTE — PROGRESS NOTES
DATE OF INITIAL PHYSICAL THERAPY EVALUATION:             March 5, 2018      REFERRING PROVIDER:       Tania Clark NP      REFERRING DIAGNOSIS:     Acute bilateral low back pain with left-sided sciatica [M54.42]      ORDERS:   Evaluate and treat as indicated    VISIT    #16      SUBJECTIVE:    Patient comes in today complaining of pain in her LE's.  Traction continue to provide relief of her leg pain; however, with activity her leg pain returns.      Patient prevously reported the passive stretching by the therapist tends to increase her lumbar and LE pain.    She states working also increases her discomfort due to the long hours of continuous standing.    She has reported the anti-inflammatory medication makes her sleepy and does not seem to be helping with her overall level of pain.    She has not yet begun the gabapentin.    Patient states Dr. Nava discussed a possible referral to Dr. Delvalle for possible injections.  She is planning to discuss this further with Dr. Nava.    Patients primary complaint(s):  Left LE pain  Lumbar stiffness  Impaired ADL's    History of present condition:  (provided by the patient at the time of the re-evaluation)  Patient reports she was injured in January 2018 when she slipped on ice on her driveway and fell landing on her buttocks.  Initially she began to experience lumbar pain with numbness and tingling in the left LE.     Her main complaint is remaining lumbar stiffness with less pain.  Radiating pain into the left LE remains and continues to be aggravated by activity.   She reports her leg pain is located throughout the lateral aspect of the left lower leg from just below the knee to her ankle with tenderness along the distribution of the left IT Band.  Patient reports her leg pain continues to be agravated by standing, bending, and lifting.  Pain increases when first attempting to move about after sitting for an extended period of time.  Pain eases slightly  after moving about for a few minutes; however, extended activity increases symptoms.      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      Section 1- Pain intensity    Score 2/5   Section 2- Person care  Score 2/5   Section 3 Lifting- Optional  Score 3/5     Section 4  Walking  Score 1/5  Section 5 Sitting   Score 1/5   Section 6 Standing  Score 1/5  Section 7 Sleeping  Score 2/5   Section 8 Social Life   Score 1/5  Section 9 Traveling  Score 1/5   Section 10 Employ/home  Score 2/5    Patient reports 32% disability on the Modified Oswestry Low Back Pain Disability Questionnaire.  Patient previously reported 54% disability on the Modified Oswestry Low Back Pain Disability Questionnaire.    Past Medical History and co-morbidities:  Past Medical History:   Diagnosis Date    Cholelithiasis      Patient Active Problem List   Diagnosis    Morbid obesity with BMI of 40.0-44.9, adult    Family history of diabetes mellitus    Hidradenitis axillaris    Hyperlipidemia    Iron deficiency anemia    Tobacco use       Current Outpatient Prescriptions:     etodolac (LODINE) 400 MG tablet, Take 1 tablet (400 mg total) by mouth 2 (two) times daily., Disp: 60 tablet, Rfl: 1    gabapentin (NEURONTIN) 300 MG capsule, Take 1 capsule (300 mg total) by mouth every evening., Disp: 30 capsule, Rfl: 1    tiZANidine (ZANAFLEX) 4 MG tablet, Take 1 tablet (4 mg total) by mouth every 12 (twelve) hours as needed., Disp: 30  tablet, Rfl: 0      Previous physical therapy and treatments:  Patient has not participated in a prior physical therapy program to date for current referring diagnoses.    Occupational/psychosocial/educational profile:  Working as a       OBJECTIVE:    Musculoskeletal Exam:  (from re-evaluation)    Postural Exam  No significant postural deviations noted.  SI exam negative for ilial rotation.    ROM  Lumbar Spine:  Flexion:   75% of full expected motion; end point reported to be stiff in the lumbar region, increases left LE pain.     Extension   50% of full expected motion; end point reported to be stiff in the lumbar region; increases left LE pain.   Right side bending  75% of full expected motion   Left side bending  75% of full expected motion      Flexibility  Limited flexibility noted in the HS, IT Band and piriformis bilaterally.    Functional Strength Testing  No strength deficits or imbalances noted throughout the LE or hip girdle muscle groups.    Palpation  Tenderness to palpation reported along the left IT Band  Tender over left trochanteric bursa  Non tender over painful area in left lower leg region  Lumbar paraspinal muscle guarding present.    Neuromuscular Exam    Gait  Mildly antalgic with weight bearing on the left LE    Transitional Movements  Complains of left leg discomfort and lumbar stiffness when moving from sit to stand, sit to supine, supine to sit.    Sensation  No sensory disturbances noted throughout the LE's  Patient did not complain of paresthesias.  Mild positive SLR left LE      PROBLEM LIST - ASSESSMENT  Patient's lumbar ROM has improved as her complaints of lumbar pain has decreased.  Left LE pain continues to be aggravated by functional activity and is limiting the patient's mobility.  Lumbar traction is providing more lasting relief of her radiating left LE pain than previously.  Muscle stimulation relieves lumbar stiffness.  Patient is benefiting from flexibility exercises  for the lumbar, hip girdle, and LE muscle groups.  Patient agrees to proceed with core strengthening once she has been taking the anti-inflammatory.    Activity Limitations, Participation Restrictions, and CO-MORBIDITIES which may impact the plan of care and potentially impede the patient's progress in therapy include:  none    The patient does not present with any learning or communication barriers which may impact the plan of care and potentially impede the patient's progress in therapy.      CLINICAL PRESENTATION:  Patient's Clinical Presentation is STABLE.      RECOMMENDED PLAN OF CARE:    Continue Pelvic traction to address radiating left LE pain.  Continue muscle stimulation to decrease lumbar paraspinal muscle guarding and stiffness  Continue lumbar flexibility exercises to improve lumbar ROM and stretching exercises for the hip girdle and LE muscle groups  Core stabilization exericses    TREATMENT PROVIDED:       -moist heat and muscle stimulation x 20 mins applied to the lumbar paraspinal muscle groups to decrease MFP and lumbar spine stiffness  -static lumbar traction in supine x 20 mins at 90 lbs  Patient reported experiencing less radiating leg pain post traction today.    -passive stretching provided by the therapist - declined  -Lumbar rotational stretches in supine  -Lumbar flexion stretches in supine  -hamstring stretching  -piriformis stretching  -IT Band stretching  -gluteus medius stretching  -stretching of the hip internal rotators    Patient states she is too uncomfortable today to begin the core exercises.    Patient has been instructed in the following exercises.  -Lumbar rotational ROM in supine  -Lumbar rotational stretches in supine  -Lumbar flexion stretches in supine  -hamstring stretching  -piriformis stretching  -IT Band stretching  -gluteus medius stretching  -stretching of the hip internal rotators    PLAN:    Patient to continue out-patient physical therapy to continue the  recommended plan of care.    SHORT TERM GOALS:    1. Patient will report pain rating for left LE 3/10 or less  2. Instruct patient in lumbar flexibility exercises and stretching for the hip girdle and LE muscle groups- goal met  3. Patient to report compliance with daily stretching exercises. - goal met    LONG TERM GOALS:  1. Resolution of left LE radiating pain  2. Full, pain free ROM of the lumbar spine  3. Patient will report functional disability on the Oswestry functional assessment tool of 15% or less        These services are reasonable and necessary for the conditions set forth above while under my care.

## 2018-05-11 ENCOUNTER — CLINICAL SUPPORT (OUTPATIENT)
Dept: REHABILITATION | Facility: HOSPITAL | Age: 38
End: 2018-05-11
Attending: NURSE PRACTITIONER
Payer: MEDICAID

## 2018-05-11 DIAGNOSIS — M54.42 ACUTE BACK PAIN WITH SCIATICA, LEFT: Primary | ICD-10-CM

## 2018-05-11 PROCEDURE — 97012 MECHANICAL TRACTION THERAPY: CPT | Mod: PO | Performed by: PHYSICAL THERAPIST

## 2018-05-11 PROCEDURE — 97014 ELECTRIC STIMULATION THERAPY: CPT | Mod: PO | Performed by: PHYSICAL THERAPIST

## 2018-05-11 NOTE — PROGRESS NOTES
"    DATE OF INITIAL PHYSICAL THERAPY EVALUATION:             March 5, 2018      REFERRING PROVIDER:       Tania Clark NP      REFERRING DIAGNOSIS:     Acute bilateral low back pain with left-sided sciatica [M54.42]      ORDERS:   Evaluate and treat as indicated    VISIT    #17      SUBJECTIVE:    "My back and legs are hurting."      She reports traction provides temporary relief of her leg pain; however, with activity her leg pain returns.      Patient prevously reported the passive stretching by the therapist tends to increase her lumbar and LE pain.    She states working also increases her discomfort due to the long hours of continuous standing.     She states the anti-inflammatory medication makes her sleepy and does not seem to be helping with her overall level of pain.    She reports taking the gabapentin every other day.    Patient states Dr. Nava discussed a possible referral to Dr. Delvalle for possible injections.  She is planning to discuss this further with Dr. Nava.    Patients primary complaint(s):  Left LE pain  Lumbar stiffness  Impaired ADL's    History of present condition:  (provided by the patient at the time of the re-evaluation)  Patient reports she was injured in January 2018 when she slipped on ice on her driveway and fell landing on her buttocks.  Initially she began to experience lumbar pain with numbness and tingling in the left LE.     Her main complaint is remaining lumbar stiffness with less pain.  Radiating pain into the left LE remains and continues to be aggravated by activity.   She reports her leg pain is located throughout the lateral aspect of the left lower leg from just below the knee to her ankle with tenderness along the distribution of the left IT Band.  Patient reports her leg pain continues to be agravated by standing, bending, and lifting.  Pain increases when first attempting to move about after sitting for an extended period of time.  Pain eases slightly after " moving about for a few minutes; however, extended activity increases symptoms.      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      Section 1- Pain intensity    Score 2/5   Section 2- Person care  Score 2/5   Section 3 Lifting- Optional  Score 3/5     Section 4  Walking  Score 1/5  Section 5 Sitting   Score 1/5   Section 6 Standing  Score 1/5  Section 7 Sleeping  Score 2/5   Section 8 Social Life   Score 1/  Section 9 Traveling  Score 1/   Section 10 Employ/home  Score 2/5    Patient reports 32% disability on the Modified Oswestry Low Back Pain Disability Questionnaire.  Patient previously reported 54% disability on the Modified Oswestry Low Back Pain Disability Questionnaire.    Past Medical History and co-morbidities:  Past Medical History:   Diagnosis Date    Cholelithiasis      Patient Active Problem List   Diagnosis    Morbid obesity with BMI of 40.0-44.9, adult    Family history of diabetes mellitus    Hidradenitis axillaris    Hyperlipidemia    Iron deficiency anemia    Tobacco use       Current Outpatient Prescriptions:     etodolac (LODINE) 400 MG tablet, Take 1 tablet (400 mg total) by mouth 2 (two) times daily., Disp: 60 tablet, Rfl: 1    gabapentin (NEURONTIN) 300 MG capsule, Take 1 capsule (300 mg total) by mouth every evening., Disp: 30 capsule, Rfl: 1    tiZANidine (ZANAFLEX) 4 MG tablet, Take 1 tablet (4 mg total) by mouth every 12 (twelve) hours as needed., Disp: 30 tablet,  Rfl: 0      Previous physical therapy and treatments:  Patient has not participated in a prior physical therapy program to date for current referring diagnoses.    Occupational/psychosocial/educational profile:  Working as a       OBJECTIVE:    Musculoskeletal Exam:  (from re-evaluation)    Postural Exam  No significant postural deviations noted.  SI exam negative for ilial rotation.    ROM  Lumbar Spine:  Flexion:   75% of full expected motion; end point reported to be stiff in the lumbar region, increases left LE pain.     Extension   50% of full expected motion; end point reported to be stiff in the lumbar region; increases left LE pain.   Right side bending  75% of full expected motion   Left side bending  75% of full expected motion      Flexibility  Limited flexibility noted in the HS, IT Band and piriformis bilaterally.    Functional Strength Testing  No strength deficits or imbalances noted throughout the LE or hip girdle muscle groups.    Palpation  Tenderness to palpation reported along the left IT Band  Tender over left trochanteric bursa  Non tender over painful area in left lower leg region  Lumbar paraspinal muscle guarding present.    Neuromuscular Exam    Gait  Mildly antalgic with weight bearing on the left LE    Transitional Movements  Complains of left leg discomfort and lumbar stiffness when moving from sit to stand, sit to supine, supine to sit.    Sensation  No sensory disturbances noted throughout the LE's  Patient did not complain of paresthesias.  Mild positive SLR left LE      PROBLEM LIST - ASSESSMENT  Patient's lumbar ROM has improved as her complaints of lumbar pain has decreased.  Left LE pain continues to be aggravated by functional activity and is limiting the patient's mobility.  Lumbar traction is providing more lasting relief of her radiating left LE pain than previously.  Muscle stimulation relieves lumbar stiffness.  Patient is benefiting from flexibility exercises for the  lumbar, hip girdle, and LE muscle groups.  Patient agrees to proceed with core strengthening once she has been taking the anti-inflammatory.    Activity Limitations, Participation Restrictions, and CO-MORBIDITIES which may impact the plan of care and potentially impede the patient's progress in therapy include:  none    The patient does not present with any learning or communication barriers which may impact the plan of care and potentially impede the patient's progress in therapy.      CLINICAL PRESENTATION:  Patient's Clinical Presentation is STABLE.      RECOMMENDED PLAN OF CARE:    Continue Pelvic traction to address radiating left LE pain.  Continue muscle stimulation to decrease lumbar paraspinal muscle guarding and stiffness  Continue lumbar flexibility exercises to improve lumbar ROM and stretching exercises for the hip girdle and LE muscle groups  Core stabilization exericses    TREATMENT PROVIDED:       -moist heat and muscle stimulation x 20 mins applied to the lumbar paraspinal muscle groups to decrease MFP and lumbar spine stiffness  -static lumbar traction in supine x 20 mins at 90 lbs  Patient reported experiencing less radiating leg pain post traction today.    -passive stretching provided by the therapist - declined  -Lumbar rotational stretches in supine  -Lumbar flexion stretches in supine  -hamstring stretching  -piriformis stretching  -IT Band stretching  -gluteus medius stretching  -stretching of the hip internal rotators    Patient states she is too uncomfortable today to begin the core exercises.    Patient has been instructed in the following exercises.  -Lumbar rotational ROM in supine  -Lumbar rotational stretches in supine  -Lumbar flexion stretches in supine  -hamstring stretching  -piriformis stretching  -IT Band stretching  -gluteus medius stretching  -stretching of the hip internal rotators    PLAN:    Patient to continue out-patient physical therapy to continue the recommended plan  of care.    SHORT TERM GOALS:    1. Patient will report pain rating for left LE 3/10 or less  2. Instruct patient in lumbar flexibility exercises and stretching for the hip girdle and LE muscle groups- goal met  3. Patient to report compliance with daily stretching exercises. - goal met    LONG TERM GOALS:  1. Resolution of left LE radiating pain  2. Full, pain free ROM of the lumbar spine  3. Patient will report functional disability on the Oswestry functional assessment tool of 15% or less        These services are reasonable and necessary for the conditions set forth above while under my care.

## 2018-05-28 ENCOUNTER — CLINICAL SUPPORT (OUTPATIENT)
Dept: REHABILITATION | Facility: HOSPITAL | Age: 38
End: 2018-05-28
Attending: NURSE PRACTITIONER
Payer: MEDICAID

## 2018-05-28 DIAGNOSIS — M54.42 ACUTE BACK PAIN WITH SCIATICA, LEFT: Primary | ICD-10-CM

## 2018-05-28 PROCEDURE — 97110 THERAPEUTIC EXERCISES: CPT | Mod: PO | Performed by: PHYSICAL THERAPIST

## 2018-05-28 NOTE — PROGRESS NOTES
DATE OF INITIAL PHYSICAL THERAPY EVALUATION:             2018      REFERRING PROVIDER:       Tania Clark NP      REFERRING DIAGNOSIS:     Acute bilateral low back pain with left-sided sciatica [M54.42]      ORDERS:   Evaluate and treat as indicated    VISIT    #18      SUBJECTIVE:  Patient comes in today stating she has been pain free since her last therapy appointment on 2018.  She states she has continued with the stretching stretching exercises as recommended and she is still planning to follow up with Dr. Delvalle.    Patients previous complaint(s):  Left LE pain  Lumbar stiffness  Impaired ADL's    History of present condition:  (provided by the patient at the time of the re-evaluation)  Patient reports she was injured in 2018 when she slipped on ice on her driveway and fell landing on her buttocks.  Initially she began to experience lumbar pain with numbness and tingling in the left LE.     Her main complaint is remaining lumbar stiffness with less pain.  Radiating pain into the left LE remains and continues to be aggravated by activity.   She reports her leg pain is located throughout the lateral aspect of the left lower leg from just below the knee to her ankle with tenderness along the distribution of the left IT Band.  Patient reports her leg pain continues to be agravated by standing, bending, and lifting.  Pain increases when first attempting to move about after sitting for an extended period of time.  Pain eases slightly after moving about for a few minutes; however, extended activity increases symptoms.      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      Section 1- Pain intensity    Score 2/5   Section 2- Person care  Score 2/5   Section 3 Lifting- Optional  Score 3/5     Section 4  Walking  Score 1/5  Section 5 Sitting   Score 1/5   Section 6 Standing  Score 1/5  Section 7 Sleeping  Score 2/5   Section 8 Social Life   Score 1/5  Section 9 Traveling  Score 1/5   Section 10 Employ/home  Score 2/5    Patient reports 32% disability on the Modified Oswestry Low Back Pain Disability Questionnaire.  Patient previously reported 54% disability on the Modified Oswestry Low Back Pain Disability Questionnaire.    Past Medical History and co-morbidities:  Past Medical History:   Diagnosis Date    Cholelithiasis      Patient Active Problem List   Diagnosis    Morbid obesity with BMI of 40.0-44.9, adult    Family history of diabetes mellitus    Hidradenitis axillaris    Hyperlipidemia    Iron deficiency anemia    Tobacco use       Current Outpatient Prescriptions:     etodolac (LODINE) 400 MG tablet, Take 1 tablet (400 mg total) by mouth 2 (two) times daily., Disp: 60 tablet, Rfl: 1    gabapentin (NEURONTIN) 300 MG capsule, Take 1 capsule (300 mg total) by mouth every evening., Disp: 30 capsule, Rfl: 1    tiZANidine (ZANAFLEX) 4 MG tablet, Take 1 tablet (4 mg total) by mouth every 12 (twelve) hours as needed., Disp: 30 tablet, Rfl: 0      Previous physical therapy and treatments:  Patient has not participated in a prior physical therapy program to date for current referring diagnoses.    Occupational/psychosocial/educational profile:  Working as a       OBJECTIVE:    Musculoskeletal Exam:  (from re-evaluation)    Postural Exam  No significant postural deviations noted.  SI exam negative for ilial rotation.    ROM  Lumbar Spine:  Flexion:   75% of full expected motion; end point  reported to be stiff in the lumbar region   Extension   75% of full expected motion; end point reported to be stiff in the lumbar region   Right side bending  75% of full expected motion   Left side bending  75% of full expected motion      Flexibility  Limited flexibility noted in the HS, IT Band and piriformis bilaterally.    Functional Strength Testing  No strength deficits or imbalances noted throughout the LE or hip girdle muscle groups.    Palpation  Tenderness to palpation reported along the left IT Band  Tender over left trochanteric bursa  Non tender over painful area in left lower leg region  Lumbar paraspinal muscle guarding present.    Neuromuscular Exam    Gait  Mildly antalgic with weight bearing on the left LE    Transitional Movements  Unrestricted and non-painful    Sensation  No sensory disturbances noted throughout the LE's  Patient did not complain of paresthesias.        PROBLEM LIST - ASSESSMENT  Patient's lumbar ROM has improved as her complaints of lumbar pain has decreased.  She is not experiencing any radiating Left LE pain today.  Functional activities are not limited.   Patient is benefiting from flexibility exercises for the lumbar, hip girdle, and LE muscle groups.  Patient agrees to proceed with core strengthening today.  Recommend suspending the traction since she is no longer experiencing any radiating LE pain.    Activity Limitations, Participation Restrictions, and CO-MORBIDITIES which may impact the plan of care and potentially impede the patient's progress in therapy include:  none    The patient does not present with any learning or communication barriers which may impact the plan of care and potentially impede the patient's progress in therapy.      CLINICAL PRESENTATION:  Patient's Clinical Presentation is STABLE.      RECOMMENDED PLAN OF CARE:    Hold Pelvic traction with absence of radiating left LE pain.  Discontinue muscle stimulation  Continue lumbar flexibility exercises  to improve lumbar ROM and stretching exercises for the hip girdle and LE muscle groups  Begin Core stabilization exericses    TREATMENT PROVIDED:       -moist heat and muscle stimulation x 20 mins applied to the lumbar paraspinal muscle groups to decrease MFP and lumbar spine stiffness - deferred  -static lumbar traction in supine x 20 mins at 90 lbs - deferred      -passive stretching provided by the therapist - deferred  -Lumbar rotational stretches in supine  -Lumbar flexion stretches in supine  -hamstring stretching  -piriformis stretching  -IT Band stretching  -gluteus medius stretching  -stretching of the hip internal rotators    Patient performed the following core exercises today:  (one on one for therapeutic exercises x 25 mins)  -modified lateral pull downs  -cross body punch outs  -alternating reverse rows  -trunk stabilization/balance of theraball with single hip flexion lifts/TKE        Patient has been instructed in the following exercises.  -Lumbar rotational ROM in supine  -Lumbar rotational stretches in supine  -Lumbar flexion stretches in supine  -hamstring stretching  -piriformis stretching  -IT Band stretching  -gluteus medius stretching  -stretching of the hip internal rotators    PLAN:    Patient to continue out-patient physical therapy to continue the recommended plan of care.    SHORT TERM GOALS:    1. Patient will report pain rating for left LE 3/10 or less - goal met  2. Instruct patient in lumbar flexibility exercises and stretching for the hip girdle and LE muscle groups- goal met  3. Patient to report compliance with daily stretching exercises. - goal met    LONG TERM GOALS:  1. Resolution of left LE radiating pain - goal met  2. Full, pain free ROM of the lumbar spine  3. Patient will report functional disability on the Oswestry functional assessment tool of 15% or less        These services are reasonable and necessary for the conditions set forth above while under my care.

## 2018-05-30 ENCOUNTER — TELEPHONE (OUTPATIENT)
Dept: INTERNAL MEDICINE | Facility: CLINIC | Age: 38
End: 2018-05-30

## 2018-05-30 ENCOUNTER — OFFICE VISIT (OUTPATIENT)
Dept: INTERNAL MEDICINE | Facility: CLINIC | Age: 38
End: 2018-05-30
Payer: MEDICAID

## 2018-05-30 VITALS
OXYGEN SATURATION: 99 % | DIASTOLIC BLOOD PRESSURE: 88 MMHG | SYSTOLIC BLOOD PRESSURE: 120 MMHG | WEIGHT: 286.38 LBS | HEART RATE: 84 BPM | TEMPERATURE: 98 F | HEIGHT: 69 IN | BODY MASS INDEX: 42.42 KG/M2

## 2018-05-30 DIAGNOSIS — G89.29 CHRONIC BILATERAL LOW BACK PAIN WITHOUT SCIATICA: Primary | ICD-10-CM

## 2018-05-30 DIAGNOSIS — M54.50 CHRONIC BILATERAL LOW BACK PAIN WITHOUT SCIATICA: Primary | ICD-10-CM

## 2018-05-30 PROCEDURE — 99213 OFFICE O/P EST LOW 20 MIN: CPT | Mod: PBBFAC | Performed by: INTERNAL MEDICINE

## 2018-05-30 PROCEDURE — 99999 PR PBB SHADOW E&M-EST. PATIENT-LVL III: CPT | Mod: PBBFAC,,, | Performed by: INTERNAL MEDICINE

## 2018-05-30 PROCEDURE — 99213 OFFICE O/P EST LOW 20 MIN: CPT | Mod: S$PBB,,, | Performed by: INTERNAL MEDICINE

## 2018-05-30 NOTE — LETTER
May 30, 2018               [facility]  Internal Medicine  [address] [place] [id number]  Phone: [phone]  Fax: [phone]   May 30, 2018     Patient: Karen Rudolph   YOB: 1980   Date of Visit: 5/30/2018       To Whom it May Concern:    Karen Rudolph was seen in my clinic on 5/30/2018. Please excuse from work from 5/29/2018. She may return to work on 6/3/2018.    If you have any questions or concerns, please don't hesitate to call.    Sincerely,         Lula Ng, DO

## 2018-05-30 NOTE — TELEPHONE ENCOUNTER
I spoke with the above patient, patient stated she need a doctor excuse, told patient inorder to get a doctor excuse she needs to come in and see the doctor, patient stated okay can I come in today? I scheduled her appointment today for 1:00 pm with Dr. Ng

## 2018-05-30 NOTE — PROGRESS NOTES
Subjective:       Patient ID: Karen Rudolph is a 37 y.o. female.    Chief Complaint: Back Pain    Back Pain   This is a recurrent problem. The current episode started more than 1 month ago. The problem occurs constantly. The problem has been waxing and waning since onset. The pain is present in the lumbar spine. The quality of the pain is described as aching. The pain does not radiate. The pain is worse during the day. The symptoms are aggravated by bending and position. Associated symptoms include leg pain (occasionally). Pertinent negatives include no fever, numbness, paresis, paresthesias, tingling or weakness. Risk factors include obesity. She has tried NSAIDs and muscle relaxant for the symptoms. The treatment provided mild relief.   She took off yesterday and would like to return Sunday.   She would like intermittent leave from work as well. She states her back pain does not flare often but when it does she cannot work.   Currently she works for [employer]. She has to do moderate lifting.     Review of Systems   Constitutional: Negative for fever.   Musculoskeletal: Positive for back pain.   Neurological: Negative for tingling, weakness, numbness and paresthesias.       Objective:      Physical Exam   Constitutional: She is oriented to person, place, and time. She appears well-developed and well-nourished. No distress.   Eyes: No scleral icterus.   Cardiovascular: Normal rate.    Pulmonary/Chest: Effort normal. No respiratory distress.   Musculoskeletal: She exhibits no edema.        Lumbar back: She exhibits decreased range of motion, tenderness and pain. She exhibits no bony tenderness, no swelling, no edema, no deformity and no laceration.   Neurological: She is alert and oriented to person, place, and time. She has normal strength. No sensory deficit.   Skin: Skin is warm and dry.   Psychiatric: She has a normal mood and affect.   Vitals reviewed.      Assessment:       1. Chronic bilateral low back pain  without sciatica        Plan:       Karen was seen today for back pain.    Diagnoses and all orders for this visit:    Chronic bilateral low back pain without sciatica  -     Continue current medication  -     Advised to bring paperwork for intermittent leave    F/U in 3 months for back pain.

## 2018-05-30 NOTE — TELEPHONE ENCOUNTER
----- Message from Jazmyne [place] sent at 5/29/2018  1:05 PM CDT -----  Contact: pt-  [phone]  Needs doctor excuse for work for ongoing condition.

## 2018-09-21 ENCOUNTER — PATIENT OUTREACH (OUTPATIENT)
Dept: ADMINISTRATIVE | Facility: HOSPITAL | Age: 38
End: 2018-09-21

## 2018-09-21 NOTE — PROGRESS NOTES
Called patient with reminder to schedule pap smear. Patient not available, number no longer in service.

## 2018-11-14 ENCOUNTER — OFFICE VISIT (OUTPATIENT)
Dept: INTERNAL MEDICINE | Facility: CLINIC | Age: 38
End: 2018-11-14
Payer: MEDICAID

## 2018-11-14 ENCOUNTER — TELEPHONE (OUTPATIENT)
Dept: INTERNAL MEDICINE | Facility: CLINIC | Age: 38
End: 2018-11-14

## 2018-11-14 VITALS
OXYGEN SATURATION: 99 % | BODY MASS INDEX: 42.94 KG/M2 | SYSTOLIC BLOOD PRESSURE: 134 MMHG | TEMPERATURE: 99 F | HEART RATE: 97 BPM | HEIGHT: 69 IN | DIASTOLIC BLOOD PRESSURE: 88 MMHG | WEIGHT: 289.88 LBS

## 2018-11-14 DIAGNOSIS — H10.13 ACUTE ATOPIC CONJUNCTIVITIS OF BOTH EYES: Primary | ICD-10-CM

## 2018-11-14 PROCEDURE — 99214 OFFICE O/P EST MOD 30 MIN: CPT | Mod: S$PBB,,, | Performed by: PHYSICIAN ASSISTANT

## 2018-11-14 PROCEDURE — 99999 PR PBB SHADOW E&M-EST. PATIENT-LVL III: CPT | Mod: PBBFAC,,, | Performed by: PHYSICIAN ASSISTANT

## 2018-11-14 PROCEDURE — 99213 OFFICE O/P EST LOW 20 MIN: CPT | Mod: PBBFAC | Performed by: PHYSICIAN ASSISTANT

## 2018-11-14 RX ORDER — MINERAL OIL
180 ENEMA (ML) RECTAL DAILY
Qty: 90 TABLET | Refills: 1 | Status: SHIPPED | OUTPATIENT
Start: 2018-11-14 | End: 2019-10-07 | Stop reason: CLARIF

## 2018-11-14 RX ORDER — FLUCONAZOLE 150 MG/1
150 TABLET ORAL ONCE
Qty: 1 TABLET | Refills: 0 | Status: SHIPPED | OUTPATIENT
Start: 2018-11-14 | End: 2018-11-14

## 2018-11-14 RX ORDER — BROMPHENIRAMINE MALEATE, PSEUDOEPHEDRINE HYDROCHLORIDE, AND DEXTROMETHORPHAN HYDROBROMIDE 2; 30; 10 MG/5ML; MG/5ML; MG/5ML
SYRUP ORAL
Refills: 0 | COMMUNITY
Start: 2018-11-08 | End: 2019-02-07

## 2018-11-14 NOTE — PROGRESS NOTES
Subjective:       Patient ID: Karen Rudolph is a 38 y.o. female.    Chief Complaint: Sinusitis (PCP - Ng); puffy eyes; and yeast inf from antibiotics    Eye Pain    Both eyes are affected.This is a new problem. The current episode started in the past 7 days. The problem occurs constantly. The problem has been unchanged. There was no injury mechanism. The pain is moderate. There is no known exposure to pink eye. She does not wear contacts. Associated symptoms include an eye discharge, eye redness, itching and a recent URI. Pertinent negatives include no blurred vision, double vision, fever, foreign body sensation, nausea, photophobia or vomiting. She has tried nothing for the symptoms.     Past Medical History:   Diagnosis Date    Cholelithiasis        Review of Systems   Constitutional: Negative for chills, fatigue and fever.   Eyes: Positive for pain, discharge, redness and itching. Negative for blurred vision, double vision and photophobia.   Respiratory: Negative for chest tightness and shortness of breath.    Cardiovascular: Negative for chest pain.   Gastrointestinal: Negative for abdominal pain, nausea and vomiting.       Objective:      Physical Exam   Constitutional: She appears well-developed and well-nourished. No distress.   HENT:   Head: Normocephalic and atraumatic.   Right Ear: Tympanic membrane and ear canal normal.   Left Ear: Tympanic membrane and ear canal normal.   Nose: Mucosal edema and rhinorrhea present.   Mouth/Throat: Uvula is midline, oropharynx is clear and moist and mucous membranes are normal. No tonsillar exudate.   Eyes: Lids are normal. Pupils are equal, round, and reactive to light. Right eye exhibits no chemosis, no discharge, no exudate and no hordeolum. No foreign body present in the right eye. Left eye exhibits no chemosis, no discharge, no exudate and no hordeolum. No foreign body present in the left eye. Right conjunctiva is injected. Right conjunctiva has no  hemorrhage. Left conjunctiva is injected. Left conjunctiva has no hemorrhage. No scleral icterus.   Neck: Neck supple.   Cardiovascular: Normal rate and regular rhythm. Exam reveals no gallop and no friction rub.   No murmur heard.  Pulmonary/Chest: Effort normal and breath sounds normal. No stridor. No respiratory distress. She has no wheezes. She has no rales. She exhibits no tenderness.   Lymphadenopathy:     She has no cervical adenopathy.   Skin: She is not diaphoretic.   Nursing note and vitals reviewed.      Assessment:       1. Acute atopic conjunctivitis of both eyes        Plan:       Acute atopic conjunctivitis of both eyes   nedocromil (ALOCRIL) 2 % ophthalmic solution; Place 1 drop into both eyes 2 (two) times daily as needed.  Dispense: 5 mL; Refill: 0  -     fexofenadine (ALLEGRA) 180 MG tablet; Take 1 tablet (180 mg total) by mouth once daily.  Dispense: 90 tablet; Refill: 1    Other orders  -    -  Patient ask for something for an yeast infection   fluconazole (DIFLUCAN) 150 MG Tab; Take 1 tablet (150 mg total) by mouth once. for 1 dose  Dispense: 1 tablet; Refill: 0

## 2018-11-14 NOTE — TELEPHONE ENCOUNTER
----- Message from Alma Delia العراقي sent at 11/14/2018  3:44 PM CST -----  Contact: self  Pt was seen today and has a question about medication. Pt has 2 medications, Diflucan and Alocril that are not covered by insurance and pt would like to know if another can be prescribed. Please call back at [phone].      Pt uses:    [facility]      Thanks,   Alma Delia العراقي

## 2018-11-14 NOTE — TELEPHONE ENCOUNTER
Spoke with the patient and she will call the pharmacy to see how much the diflucan generic is and if not will try the monistat, and she will try the visine

## 2019-02-06 ENCOUNTER — TELEPHONE (OUTPATIENT)
Dept: INTERNAL MEDICINE | Facility: CLINIC | Age: 39
End: 2019-02-06

## 2019-02-06 DIAGNOSIS — M47.26 OSTEOARTHRITIS OF SPINE WITH RADICULOPATHY, LUMBAR REGION: ICD-10-CM

## 2019-02-06 RX ORDER — GABAPENTIN 300 MG/1
300 CAPSULE ORAL NIGHTLY
Qty: 30 CAPSULE | Refills: 1 | Status: SHIPPED | OUTPATIENT
Start: 2019-02-06 | End: 2019-02-15

## 2019-02-06 RX ORDER — ETODOLAC 400 MG/1
400 TABLET, FILM COATED ORAL 2 TIMES DAILY
Qty: 60 TABLET | Refills: 1 | Status: SHIPPED | OUTPATIENT
Start: 2019-02-06 | End: 2019-02-15

## 2019-02-06 NOTE — TELEPHONE ENCOUNTER
----- Message from Elisa Emmanuel sent at 2/6/2019 12:11 PM CST -----  Contact: self   Patient requesting to for appointment to be moved up to 2:00 pm on 2/7/19. Please call back at [phone].      Thanks,  Elisa Emmanuel

## 2019-02-07 ENCOUNTER — OFFICE VISIT (OUTPATIENT)
Dept: INTERNAL MEDICINE | Facility: CLINIC | Age: 39
End: 2019-02-07
Payer: MEDICAID

## 2019-02-07 VITALS
OXYGEN SATURATION: 99 % | DIASTOLIC BLOOD PRESSURE: 94 MMHG | SYSTOLIC BLOOD PRESSURE: 150 MMHG | BODY MASS INDEX: 42.61 KG/M2 | HEART RATE: 90 BPM | HEIGHT: 69 IN | WEIGHT: 287.69 LBS | TEMPERATURE: 99 F

## 2019-02-07 DIAGNOSIS — J32.9 SINUSITIS, UNSPECIFIED CHRONICITY, UNSPECIFIED LOCATION: Primary | ICD-10-CM

## 2019-02-07 PROCEDURE — 99213 OFFICE O/P EST LOW 20 MIN: CPT | Mod: PBBFAC | Performed by: PHYSICIAN ASSISTANT

## 2019-02-07 PROCEDURE — 99999 PR PBB SHADOW E&M-EST. PATIENT-LVL III: ICD-10-PCS | Mod: PBBFAC,,, | Performed by: PHYSICIAN ASSISTANT

## 2019-02-07 PROCEDURE — 99214 OFFICE O/P EST MOD 30 MIN: CPT | Mod: S$PBB,,, | Performed by: PHYSICIAN ASSISTANT

## 2019-02-07 PROCEDURE — 99999 PR PBB SHADOW E&M-EST. PATIENT-LVL III: CPT | Mod: PBBFAC,,, | Performed by: PHYSICIAN ASSISTANT

## 2019-02-07 PROCEDURE — 99214 PR OFFICE/OUTPT VISIT, EST, LEVL IV, 30-39 MIN: ICD-10-PCS | Mod: S$PBB,,, | Performed by: PHYSICIAN ASSISTANT

## 2019-02-07 RX ORDER — PROMETHAZINE HYDROCHLORIDE AND DEXTROMETHORPHAN HYDROBROMIDE 6.25; 15 MG/5ML; MG/5ML
5 SYRUP ORAL 3 TIMES DAILY PRN
Qty: 240 ML | Refills: 0 | Status: SHIPPED | OUTPATIENT
Start: 2019-02-07 | End: 2019-02-17

## 2019-02-07 RX ORDER — AZITHROMYCIN 250 MG/1
TABLET, FILM COATED ORAL
Qty: 6 TABLET | Refills: 0 | Status: SHIPPED | OUTPATIENT
Start: 2019-02-07 | End: 2019-07-12 | Stop reason: ALTCHOICE

## 2019-02-07 RX ORDER — PREDNISONE 10 MG/1
TABLET ORAL
Qty: 18 TABLET | Refills: 0 | Status: SHIPPED | OUTPATIENT
Start: 2019-02-07 | End: 2019-07-12 | Stop reason: ALTCHOICE

## 2019-02-07 NOTE — PROGRESS NOTES
Subjective:       Patient ID: Karen Rudolph is a 38 y.o. female.    Chief Complaint: URI (PCP - [facility])    Cough   This is a recurrent problem. The current episode started more than 1 month ago. The problem has been waxing and waning. The problem occurs every few minutes. The cough is productive of sputum, productive of brown sputum and productive of purulent sputum. Associated symptoms include headaches, nasal congestion and postnasal drip. Pertinent negatives include no chest pain, chills, ear congestion, ear pain, fever, heartburn, hemoptysis, myalgias, rash, rhinorrhea, sore throat, shortness of breath, sweats, weight loss or wheezing. The symptoms are aggravated by cold air, exercise, lying down and other. Risk factors for lung disease include smoking/tobacco exposure. She has tried OTC cough suppressant, body position changes, cool air, prescription cough suppressant and rest for the symptoms. The treatment provided no relief. Her past medical history is significant for environmental allergies. There is no history of asthma, bronchiectasis, bronchitis, COPD, emphysema or pneumonia.     Past Medical History:   Diagnosis Date    Cholelithiasis        Review of Systems   Constitutional: Negative for chills, fever and weight loss.   HENT: Positive for postnasal drip. Negative for ear pain, rhinorrhea and sore throat.    Respiratory: Positive for cough. Negative for hemoptysis, shortness of breath and wheezing.    Cardiovascular: Negative for chest pain.   Gastrointestinal: Negative for heartburn.   Musculoskeletal: Negative for myalgias.   Skin: Negative for rash.   Allergic/Immunologic: Positive for environmental allergies.   Neurological: Positive for headaches.       Objective:      Physical Exam   Constitutional: She appears well-developed and well-nourished. No distress.   HENT:   Head: Normocephalic and atraumatic.   Right Ear: Tympanic membrane and ear canal normal.   Left Ear: Tympanic membrane and ear  canal normal.   Nose: Mucosal edema and rhinorrhea present.   Mouth/Throat: Uvula is midline. Posterior oropharyngeal edema and posterior oropharyngeal erythema present. No tonsillar exudate.   Neck: Neck supple.   Cardiovascular: Normal rate and regular rhythm. Exam reveals no gallop and no friction rub.   No murmur heard.  Pulmonary/Chest: Effort normal and breath sounds normal. No stridor. No respiratory distress. She has no wheezes. She has no rales. She exhibits no tenderness.   Lymphadenopathy:     She has no cervical adenopathy.   Skin: She is not diaphoretic.   Nursing note and vitals reviewed.      Assessment:       1. Sinusitis, unspecified chronicity, unspecified location        Plan:       Sinusitis, unspecified chronicity, unspecified location    Other orders  -     azithromycin (Z-PATRICK) 250 MG tablet; Follow instructions on pack.  Dispense: 6 tablet; Refill: 0  -     predniSONE (DELTASONE) 10 MG tablet; Take 3 daily for 3 days, then 2 daily for three days, then 1 daily for three days.  Dispense: 18 tablet; Refill: 0  -     promethazine-dextromethorphan (PROMETHAZINE-DM) 6.25-15 mg/5 mL Syrp; Take 5 mLs by mouth 3 (three) times daily as needed.  Dispense: 240 mL; Refill: 0       BP is elevated today. She will need to follow up in 2 weeks.

## 2019-02-15 ENCOUNTER — PATIENT MESSAGE (OUTPATIENT)
Dept: PHYSICAL MEDICINE AND REHAB | Facility: CLINIC | Age: 39
End: 2019-02-15

## 2019-02-15 DIAGNOSIS — M47.26 OSTEOARTHRITIS OF SPINE WITH RADICULOPATHY, LUMBAR REGION: ICD-10-CM

## 2019-02-15 RX ORDER — GABAPENTIN 100 MG/1
100 CAPSULE ORAL NIGHTLY
Qty: 30 CAPSULE | Refills: 1 | Status: SHIPPED | OUTPATIENT
Start: 2019-02-15 | End: 2019-10-07 | Stop reason: CLARIF

## 2019-02-15 RX ORDER — KETOROLAC TROMETHAMINE 10 MG/1
10 TABLET, FILM COATED ORAL 2 TIMES DAILY
Qty: 8 TABLET | Refills: 0 | Status: SHIPPED | OUTPATIENT
Start: 2019-02-15 | End: 2019-02-22

## 2019-02-18 ENCOUNTER — PATIENT MESSAGE (OUTPATIENT)
Dept: INTERNAL MEDICINE | Facility: CLINIC | Age: 39
End: 2019-02-18

## 2019-02-22 ENCOUNTER — PATIENT MESSAGE (OUTPATIENT)
Dept: INTERNAL MEDICINE | Facility: CLINIC | Age: 39
End: 2019-02-22

## 2019-03-06 ENCOUNTER — PATIENT MESSAGE (OUTPATIENT)
Dept: INTERNAL MEDICINE | Facility: CLINIC | Age: 39
End: 2019-03-06

## 2019-03-07 ENCOUNTER — CLINICAL SUPPORT (OUTPATIENT)
Dept: INTERNAL MEDICINE | Facility: CLINIC | Age: 39
End: 2019-03-07
Payer: MEDICAID

## 2019-03-07 VITALS — DIASTOLIC BLOOD PRESSURE: 88 MMHG | SYSTOLIC BLOOD PRESSURE: 130 MMHG

## 2019-03-07 PROCEDURE — 99999 PR PBB SHADOW E&M-EST. PATIENT-LVL I: CPT | Mod: PBBFAC,,,

## 2019-03-07 PROCEDURE — 99211 OFF/OP EST MAY X REQ PHY/QHP: CPT | Mod: PBBFAC

## 2019-03-07 PROCEDURE — 99999 PR PBB SHADOW E&M-EST. PATIENT-LVL I: ICD-10-PCS | Mod: PBBFAC,,,

## 2019-03-07 NOTE — PROGRESS NOTES
Pt here for bp check   bp 130/88  Pulse 76  No acute distress noted  Pt states she does have a slight headache

## 2019-03-07 NOTE — Clinical Note
Pt here for bp check bp 130/88Pulse 76No acute distress notedPt states she does have a slight headache

## 2019-03-08 NOTE — PROGRESS NOTES
B/P back to normal. No history of HTN.   Recommend following a heart healthy diet and increased physical activity.   Monitor b/p periodically at a local pharmacy and notify if it is above 140/90.

## 2019-05-13 ENCOUNTER — PATIENT MESSAGE (OUTPATIENT)
Dept: ADMINISTRATIVE | Facility: HOSPITAL | Age: 39
End: 2019-05-13

## 2019-05-13 ENCOUNTER — TELEPHONE (OUTPATIENT)
Dept: INTERNAL MEDICINE | Facility: CLINIC | Age: 39
End: 2019-05-13

## 2019-05-13 DIAGNOSIS — D64.9 NORMOCYTIC ANEMIA: Primary | ICD-10-CM

## 2019-05-13 NOTE — TELEPHONE ENCOUNTER
"----- Message from Melani Neal LPN sent at 5/13/2019  1:04 PM CDT -----  Ms Rudolph is trying to get her appt scheduled for July 12th. She would like "complete blood work: also. If you please orders I will assist to schedule  "

## 2019-05-24 ENCOUNTER — PATIENT MESSAGE (OUTPATIENT)
Dept: INTERNAL MEDICINE | Facility: CLINIC | Age: 39
End: 2019-05-24

## 2019-05-24 NOTE — TELEPHONE ENCOUNTER
Suggest OTC zantac 150 bid. Drink 8 to 12 glasses of water daily. Suggest over the counter Clearlax 2 scoops at night with water. Suggest over the counter stool softener. Go to the ER if worse. Otherwise  follow up with your primary care as suggested.

## 2019-07-05 ENCOUNTER — PATIENT MESSAGE (OUTPATIENT)
Dept: OBSTETRICS AND GYNECOLOGY | Facility: CLINIC | Age: 39
End: 2019-07-05

## 2019-07-05 ENCOUNTER — PATIENT MESSAGE (OUTPATIENT)
Dept: INTERNAL MEDICINE | Facility: CLINIC | Age: 39
End: 2019-07-05

## 2019-07-12 ENCOUNTER — LAB VISIT (OUTPATIENT)
Dept: LAB | Facility: HOSPITAL | Age: 39
End: 2019-07-12
Payer: COMMERCIAL

## 2019-07-12 ENCOUNTER — OFFICE VISIT (OUTPATIENT)
Dept: INTERNAL MEDICINE | Facility: CLINIC | Age: 39
End: 2019-07-12
Payer: COMMERCIAL

## 2019-07-12 ENCOUNTER — OFFICE VISIT (OUTPATIENT)
Dept: OBSTETRICS AND GYNECOLOGY | Facility: CLINIC | Age: 39
End: 2019-07-12
Payer: COMMERCIAL

## 2019-07-12 VITALS
DIASTOLIC BLOOD PRESSURE: 90 MMHG | SYSTOLIC BLOOD PRESSURE: 126 MMHG | BODY MASS INDEX: 42.78 KG/M2 | WEIGHT: 288.81 LBS | HEIGHT: 69 IN

## 2019-07-12 VITALS
OXYGEN SATURATION: 98 % | TEMPERATURE: 97 F | HEART RATE: 82 BPM | WEIGHT: 285.5 LBS | DIASTOLIC BLOOD PRESSURE: 86 MMHG | HEIGHT: 69 IN | SYSTOLIC BLOOD PRESSURE: 132 MMHG | BODY MASS INDEX: 42.29 KG/M2

## 2019-07-12 DIAGNOSIS — F32.A MILD DEPRESSION: ICD-10-CM

## 2019-07-12 DIAGNOSIS — Z01.419 PAP SMEAR, AS PART OF ROUTINE GYNECOLOGICAL EXAMINATION: ICD-10-CM

## 2019-07-12 DIAGNOSIS — D64.9 NORMOCYTIC ANEMIA: ICD-10-CM

## 2019-07-12 DIAGNOSIS — Z00.00 PREVENTATIVE HEALTH CARE: Primary | ICD-10-CM

## 2019-07-12 DIAGNOSIS — Z00.00 ANNUAL PHYSICAL EXAM: ICD-10-CM

## 2019-07-12 DIAGNOSIS — Z00.00 PREVENTATIVE HEALTH CARE: ICD-10-CM

## 2019-07-12 DIAGNOSIS — Z00.00 ANNUAL PHYSICAL EXAM: Primary | ICD-10-CM

## 2019-07-12 DIAGNOSIS — F17.200 TOBACCO USE DISORDER: ICD-10-CM

## 2019-07-12 DIAGNOSIS — E66.01 MORBID OBESITY WITH BMI OF 40.0-44.9, ADULT: ICD-10-CM

## 2019-07-12 DIAGNOSIS — Z23 IMMUNIZATION DUE: ICD-10-CM

## 2019-07-12 LAB
ALBUMIN SERPL BCP-MCNC: 3.6 G/DL (ref 3.5–5.2)
ALP SERPL-CCNC: 68 U/L (ref 55–135)
ALT SERPL W/O P-5'-P-CCNC: 11 U/L (ref 10–44)
ANION GAP SERPL CALC-SCNC: 8 MMOL/L (ref 8–16)
AST SERPL-CCNC: 15 U/L (ref 10–40)
BASOPHILS # BLD AUTO: 0.04 K/UL (ref 0–0.2)
BASOPHILS NFR BLD: 0.6 % (ref 0–1.9)
BILIRUB SERPL-MCNC: 0.1 MG/DL (ref 0.1–1)
BUN SERPL-MCNC: 6 MG/DL (ref 6–20)
CALCIUM SERPL-MCNC: 9.1 MG/DL (ref 8.7–10.5)
CHLORIDE SERPL-SCNC: 108 MMOL/L (ref 95–110)
CHOLEST SERPL-MCNC: 218 MG/DL (ref 120–199)
CHOLEST/HDLC SERPL: 5 {RATIO} (ref 2–5)
CO2 SERPL-SCNC: 24 MMOL/L (ref 23–29)
CREAT SERPL-MCNC: 0.9 MG/DL (ref 0.5–1.4)
DIFFERENTIAL METHOD: ABNORMAL
EOSINOPHIL # BLD AUTO: 0.1 K/UL (ref 0–0.5)
EOSINOPHIL NFR BLD: 1.6 % (ref 0–8)
ERYTHROCYTE [DISTWIDTH] IN BLOOD BY AUTOMATED COUNT: 13.4 % (ref 11.5–14.5)
EST. GFR  (AFRICAN AMERICAN): >60 ML/MIN/1.73 M^2
EST. GFR  (NON AFRICAN AMERICAN): >60 ML/MIN/1.73 M^2
FERRITIN SERPL-MCNC: 22 NG/ML (ref 20–300)
GLUCOSE SERPL-MCNC: 91 MG/DL (ref 70–110)
HCT VFR BLD AUTO: 40.5 % (ref 37–48.5)
HDLC SERPL-MCNC: 44 MG/DL (ref 40–75)
HDLC SERPL: 20.2 % (ref 20–50)
HGB BLD-MCNC: 12.9 G/DL (ref 12–16)
IMM GRANULOCYTES # BLD AUTO: 0.02 K/UL (ref 0–0.04)
IMM GRANULOCYTES NFR BLD AUTO: 0.3 % (ref 0–0.5)
LDLC SERPL CALC-MCNC: 148.2 MG/DL (ref 63–159)
LYMPHOCYTES # BLD AUTO: 3.1 K/UL (ref 1–4.8)
LYMPHOCYTES NFR BLD: 45.3 % (ref 18–48)
MCH RBC QN AUTO: 29.8 PG (ref 27–31)
MCHC RBC AUTO-ENTMCNC: 31.9 G/DL (ref 32–36)
MCV RBC AUTO: 94 FL (ref 82–98)
MONOCYTES # BLD AUTO: 0.4 K/UL (ref 0.3–1)
MONOCYTES NFR BLD: 5.2 % (ref 4–15)
NEUTROPHILS # BLD AUTO: 3.2 K/UL (ref 1.8–7.7)
NEUTROPHILS NFR BLD: 47 % (ref 38–73)
NONHDLC SERPL-MCNC: 174 MG/DL
NRBC BLD-RTO: 0 /100 WBC
PLATELET # BLD AUTO: 377 K/UL (ref 150–350)
PMV BLD AUTO: 9.4 FL (ref 9.2–12.9)
POTASSIUM SERPL-SCNC: 4.1 MMOL/L (ref 3.5–5.1)
PROT SERPL-MCNC: 7.3 G/DL (ref 6–8.4)
RBC # BLD AUTO: 4.33 M/UL (ref 4–5.4)
SODIUM SERPL-SCNC: 140 MMOL/L (ref 136–145)
TRIGL SERPL-MCNC: 129 MG/DL (ref 30–150)
TSH SERPL DL<=0.005 MIU/L-ACNC: 1.28 UIU/ML (ref 0.4–4)
WBC # BLD AUTO: 6.89 K/UL (ref 3.9–12.7)

## 2019-07-12 PROCEDURE — 86592 SYPHILIS TEST NON-TREP QUAL: CPT

## 2019-07-12 PROCEDURE — 85025 COMPLETE CBC W/AUTO DIFF WBC: CPT

## 2019-07-12 PROCEDURE — 87624 HPV HI-RISK TYP POOLED RSLT: CPT

## 2019-07-12 PROCEDURE — 90715 TDAP VACCINE 7 YRS/> IM: CPT | Mod: S$GLB,,, | Performed by: INTERNAL MEDICINE

## 2019-07-12 PROCEDURE — 99214 OFFICE O/P EST MOD 30 MIN: CPT | Mod: 25,S$GLB,, | Performed by: INTERNAL MEDICINE

## 2019-07-12 PROCEDURE — 84443 ASSAY THYROID STIM HORMONE: CPT

## 2019-07-12 PROCEDURE — 3008F BODY MASS INDEX DOCD: CPT | Mod: CPTII,S$GLB,, | Performed by: INTERNAL MEDICINE

## 2019-07-12 PROCEDURE — 80053 COMPREHEN METABOLIC PANEL: CPT

## 2019-07-12 PROCEDURE — 90472 IMMUNIZATION ADMIN EACH ADD: CPT | Mod: S$GLB,,, | Performed by: INTERNAL MEDICINE

## 2019-07-12 PROCEDURE — 90732 PNEUMOCOCCAL POLYSACCHARIDE VACCINE 23-VALENT =>2YO SQ IM: ICD-10-PCS | Mod: S$GLB,,, | Performed by: INTERNAL MEDICINE

## 2019-07-12 PROCEDURE — 99395 PREV VISIT EST AGE 18-39: CPT | Mod: 25,S$GLB,, | Performed by: INTERNAL MEDICINE

## 2019-07-12 PROCEDURE — 99385 PR PREVENTIVE VISIT,NEW,18-39: ICD-10-PCS | Mod: S$GLB,,, | Performed by: NURSE PRACTITIONER

## 2019-07-12 PROCEDURE — 99214 PR OFFICE/OUTPT VISIT, EST, LEVL IV, 30-39 MIN: ICD-10-PCS | Mod: 25,S$GLB,, | Performed by: INTERNAL MEDICINE

## 2019-07-12 PROCEDURE — 99385 PREV VISIT NEW AGE 18-39: CPT | Mod: S$GLB,,, | Performed by: NURSE PRACTITIONER

## 2019-07-12 PROCEDURE — 99999 PR PBB SHADOW E&M-EST. PATIENT-LVL II: ICD-10-PCS | Mod: PBBFAC,,, | Performed by: NURSE PRACTITIONER

## 2019-07-12 PROCEDURE — 90715 TDAP VACCINE GREATER THAN OR EQUAL TO 7YO IM: ICD-10-PCS | Mod: S$GLB,,, | Performed by: INTERNAL MEDICINE

## 2019-07-12 PROCEDURE — 87491 CHLMYD TRACH DNA AMP PROBE: CPT

## 2019-07-12 PROCEDURE — 86703 HIV-1/HIV-2 1 RESULT ANTBDY: CPT

## 2019-07-12 PROCEDURE — 99999 PR PBB SHADOW E&M-EST. PATIENT-LVL III: CPT | Mod: PBBFAC,,, | Performed by: INTERNAL MEDICINE

## 2019-07-12 PROCEDURE — 3008F PR BODY MASS INDEX (BMI) DOCUMENTED: ICD-10-PCS | Mod: CPTII,S$GLB,, | Performed by: INTERNAL MEDICINE

## 2019-07-12 PROCEDURE — 80061 LIPID PANEL: CPT

## 2019-07-12 PROCEDURE — 90732 PPSV23 VACC 2 YRS+ SUBQ/IM: CPT | Mod: S$GLB,,, | Performed by: INTERNAL MEDICINE

## 2019-07-12 PROCEDURE — 82728 ASSAY OF FERRITIN: CPT

## 2019-07-12 PROCEDURE — 88175 CYTOPATH C/V AUTO FLUID REDO: CPT

## 2019-07-12 PROCEDURE — 99395 PR PREVENTIVE VISIT,EST,18-39: ICD-10-PCS | Mod: 25,S$GLB,, | Performed by: INTERNAL MEDICINE

## 2019-07-12 PROCEDURE — 90472 PNEUMOCOCCAL POLYSACCHARIDE VACCINE 23-VALENT =>2YO SQ IM: ICD-10-PCS | Mod: S$GLB,,, | Performed by: INTERNAL MEDICINE

## 2019-07-12 PROCEDURE — 90471 IMMUNIZATION ADMIN: CPT | Mod: S$GLB,,, | Performed by: INTERNAL MEDICINE

## 2019-07-12 PROCEDURE — 99999 PR PBB SHADOW E&M-EST. PATIENT-LVL II: CPT | Mod: PBBFAC,,, | Performed by: NURSE PRACTITIONER

## 2019-07-12 PROCEDURE — 36415 COLL VENOUS BLD VENIPUNCTURE: CPT

## 2019-07-12 PROCEDURE — 90471 TDAP VACCINE GREATER THAN OR EQUAL TO 7YO IM: ICD-10-PCS | Mod: S$GLB,,, | Performed by: INTERNAL MEDICINE

## 2019-07-12 PROCEDURE — 99999 PR PBB SHADOW E&M-EST. PATIENT-LVL III: ICD-10-PCS | Mod: PBBFAC,,, | Performed by: INTERNAL MEDICINE

## 2019-07-12 RX ORDER — BUPROPION HYDROCHLORIDE 100 MG/1
100 TABLET, EXTENDED RELEASE ORAL 2 TIMES DAILY
Qty: 60 TABLET | Refills: 2 | Status: SHIPPED | OUTPATIENT
Start: 2019-07-12 | End: 2020-04-16

## 2019-07-12 NOTE — PATIENT INSTRUCTIONS
Bupropion sustained-release tablets (smoking cessation)  What is this medicine?  BUPROPION (byoo PROE pee on) is used to help people quit smoking.  How should I use this medicine?  Take this medicine by mouth with a glass of water. Follow the directions on the prescription label. You can take it with or without food. If it upsets your stomach, take it with food. Do not cut, crush or chew this medicine. Take your medicine at regular intervals. If you take this medicine more than once a day, take your second dose at least 8 hours after you take your first dose. To limit difficulty in sleeping, avoid taking this medicine at bedtime. Do not take your medicine more often than directed. Do not stop taking this medicine suddenly except upon the advice of your doctor. Stopping this medicine too quickly may cause serious side effects.  A special MedGuide will be given to you by the pharmacist with each prescription and refill. Be sure to read this information carefully each time.  Talk to your pediatrician regarding the use of this medicine in children. Special care may be needed.  What side effects may I notice from receiving this medicine?  Side effects that you should report to your doctor or health care professional as soon as possible:  · allergic reactions like skin rash, itching or hives, swelling of the face, lips, or tongue  · breathing problems  · changes in vision  · confusion  · fast or irregular heartbeat  · hallucinations  · increased blood pressure  · redness, blistering, peeling or loosening of the skin, including inside the mouth  · seizures  · suicidal thoughts or other mood changes  · unusually weak or tired  · vomiting  Side effects that usually do not require medical attention (report to your doctor or health care professional if they continue or are bothersome):  · change in sex drive or performance  · constipation  · headache  · loss of appetite  · nausea  · tremors  · weight loss  What may interact  with this medicine?  Do not take this medicine with any of the following medications:  · linezolid  · MAOIs like Azilect, Carbex, Eldepryl, Marplan, Nardil, and Parnate  · methylene blue (injected into a vein)  · other medicines that contain bupropion like Wellbutrin  This medicine may also interact with the following medications:  · alcohol  · certain medicines for anxiety or sleep  · certain medicines for blood pressure like metoprolol, propranolol  · certain medicines for depression or psychotic disturbances  · certain medicines for HIV or AIDS like efavirenz, lopinavir, nelfinavir, ritonavir  · certain medicines for irregular heart beat like propafenone, flecainide  · certain medicines for Parkinson's disease like amantadine, levodopa  · certain medicines for seizures like carbamazepine, phenytoin, phenobarbital  · cimetidine  · clopidogrel  · cyclophosphamide  · furazolidone  · isoniazid  · nicotine  · orphenadrine  · procarbazine  · steroid medicines like prednisone or cortisone  · stimulant medicines for attention disorders, weight loss, or to stay awake  · tamoxifen  · theophylline  · thiotepa  · ticlopidine  · tramadol  · warfarin  What if I miss a dose?  If you miss a dose, skip the missed dose and take your next tablet at the regular time. There should be at least 8 hours between doses. Do not take double or extra doses.  Where should I keep my medicine?  Keep out of the reach of children.  Store at room temperature between 20 and 25 degrees C (68 and 77 degrees F). Protect from light. Keep container tightly closed. Throw away any unused medicine after the expiration date.  What should I tell my health care provider before I take this medicine?  They need to know if you have any of these conditions:  · an eating disorder, such as anorexia or bulimia  · bipolar disorder or psychosis  · diabetes or high blood sugar, treated with medication  · glaucoma  · head injury or brain tumor  · heart disease, previous  heart attack, or irregular heart beat  · high blood pressure  · kidney or liver disease  · seizures  · suicidal thoughts or a previous suicide attempt  · Tourette's syndrome  · weight loss  · an unusual or allergic reaction to bupropion, other medicines, foods, dyes, or preservatives  · breast-feeding  · pregnant or trying to become pregnant  What should I watch for while using this medicine?  Visit your doctor or health care professional for regular checks on your progress. This medicine should be used together with a patient support program. It is important to participate in a behavioral program, counseling, or other support program that is recommended by your health care professional.  Patients and their families should watch out for new or worsening thoughts of suicide or depression. Also watch out for sudden changes in feelings such as feeling anxious, agitated, panicky, irritable, hostile, aggressive, impulsive, severely restless, overly excited and hyperactive, or not being able to sleep. If this happens, especially at the beginning of treatment or after a change in dose, call your health care professional.  Avoid alcoholic drinks while taking this medicine. Drinking excessive alcoholic beverages, using sleeping or anxiety medicines, or quickly stopping the use of these agents while taking this medicine may increase your risk for a seizure.  Do not drive or use heavy machinery until you know how this medicine affects you. This medicine can impair your ability to perform these tasks.  Do not take this medicine close to bedtime. It may prevent you from sleeping.  Your mouth may get dry. Chewing sugarless gum or sucking hard candy, and drinking plenty of water may help. Contact your doctor if the problem does not go away or is severe.  Do not use nicotine patches or chewing gum without the advice of your doctor or health care professional while taking this medicine. You may need to have your blood pressure taken  regularly if your doctor recommends that you use both nicotine and this medicine together.  NOTE:This sheet is a summary. It may not cover all possible information. If you have questions about this medicine, talk to your doctor, pharmacist, or health care provider. Copyright© 2017 [employer]

## 2019-07-12 NOTE — PROGRESS NOTES
"CC: Well woman exam    Karen Rudolph is a 38 y.o. female  presents for well woman exam.  LMP: Patient's last menstrual period was 2019 (approximate)..  No issues, problems, or complaints. Is sexually active, using condoms. Wants to discuss birth control options. Cycles are every 26-28 days, painful. Last pap exam was normal. Patient reports " pain to c/s incision".    Past Medical History:   Diagnosis Date    Cholelithiasis     Chronic back pain      Past Surgical History:   Procedure Laterality Date     SECTION      COLPOSCOPY       Social History     Socioeconomic History    Marital status: Single     Spouse name: Not on file    Number of children: Not on file    Years of education: Not on file    Highest education level: Not on file   Occupational History    Not on file   Social Needs    Financial resource strain: Not very hard    Food insecurity:     Worry: Patient refused     Inability: Never true    Transportation needs:     Medical: No     Non-medical: No   Tobacco Use    Smoking status: Current Every Day Smoker     Types: Cigarettes    Smokeless tobacco: Current User   Substance and Sexual Activity    Alcohol use: Yes     Alcohol/week: 0.0 oz     Frequency: 2-4 times a month     Drinks per session: 3 or 4     Binge frequency: Less than monthly    Drug use: No    Sexual activity: Yes     Partners: Male   Lifestyle    Physical activity:     Days per week: 0 days     Minutes per session: 0 min    Stress: Very much   Relationships    Social connections:     Talks on phone: More than three times a week     Gets together: Once a week     Attends Hindu service: Not on file     Active member of club or organization: No     Attends meetings of clubs or organizations: Patient refused     Relationship status: Never    Other Topics Concern    Not on file   Social History Narrative    Not on file     Family History   Problem Relation Age of Onset    Diabetes " "Mother     Hypertension Mother      OB History        1    Para   1    Term   1            AB        Living   1       SAB        TAB        Ectopic        Multiple        Live Births   1                 BP (!) 126/90 (BP Location: Right arm, Patient Position: Sitting, BP Method: Large (Manual))   Ht 5' 9" (1.753 m)   Wt 131 kg (288 lb 12.8 oz)   LMP 2019 (Approximate)   BMI 42.65 kg/m²       ROS:  GENERAL: Denies weight gain or weight loss. Feeling well overall.   SKIN: Denies rash or lesions.   HEAD: Denies head injury or headache.   NODES: Denies enlarged lymph nodes.   CHEST: Denies chest pain or shortness of breath.   CARDIOVASCULAR: Denies palpitations or left sided chest pain.   ABDOMEN: No abdominal pain, constipation, diarrhea, nausea, vomiting or rectal bleeding. HPI  URINARY: No frequency, dysuria, hematuria, or burning on urination.  REPRODUCTIVE: See HPI.   BREASTS: The patient performs breast self-examination and denies pain, lumps, or nipple discharge.   HEMATOLOGIC: No easy bruisability or excessive bleeding.   MUSCULOSKELETAL: Denies joint pain or swelling.   NEUROLOGIC: Denies syncope or weakness.   PSYCHIATRIC: Denies depression, anxiety or mood swings.    PHYSICAL EXAM:  APPEARANCE: Obese female, in no acute distress.  AFFECT: WNL, alert and oriented x 3  SKIN: No acne or hirsutism  NECK: Neck symmetric without masses or thyromegaly  NODES: No inguinal, cervical, axillary, or femoral lymph node enlargement  CHEST: Good respiratory effect  ABDOMEN: Soft.  No tenderness or masses.  No hepatosplenomegaly.  No hernias.  BREASTS: Symmetrical, no skin changes or visible lesions.  No palpable masses, nipple discharge bilaterally.  PELVIC: Normal external genitalia without lesions.  Normal hair distribution.  Adequate perineal body, normal urethral meatus.  Vagina moist and well rugated without lesions or discharge.  Cervix pink, without lesions, discharge or tenderness.  No " significant cystocele or rectocele.  Bimanual exam shows uterus to be normal size, regular, mobile and nontender.  Adnexa without masses or tenderness.    EXTREMITIES: No edema.    1. Preventative health care  Liquid-based pap smear, screening    HPV High Risk Genotypes, PCR    RPR    HIV 1/2 Ag/Ab (4th Gen)    C. trachomatis/N. gonorrhoeae by AMP DNA   2. Pap smear, as part of routine gynecological examination  Liquid-based pap smear, screening    HPV High Risk Genotypes, PCR    RPR    HIV 1/2 Ag/Ab (4th Gen)    C. trachomatis/N. gonorrhoeae by AMP DNA     PLAN:  MD appt for c/s incisional pain: exam was unremarkable  STD assessment  Discussed birth control options: is over 35 and smokes 2 ppd  Pap exam   Patient was counseled today on A.C.S. Pap guidelines and recommendations for yearly pelvic exams, mammograms and monthly self breast exams; to see her PCP for other health maintenance.

## 2019-07-12 NOTE — PROGRESS NOTES
Subjective:       Patient ID: Karen Rudolph is a 38 y.o. female.    Chief Complaint: Annual Exam    Karen Rudolph  38 y.o. Black or  female     Patient presents with:  Annual Exam    HPI: Here today for an annual physical.   She has been having mild depression. She reports fatigue and difficulty sleeping.   She is also a smoker and would like help with quitting.   She is fasting today for labs.     Lipid Panel due on 1982  Pneumococcal Vaccine (Medium Risk)(1 of 1 - PPSV23) due on 2001  TETANUS VACCINE due on 2005  Pap Smear with HPV Cotest due on 2015  Influenza Vaccine due on 2019    Past Medical History:  Cholelithiasis  Chronic back pain    Past Surgical History:   SECTION  COLPOSCOPY    Review of patient's family history indicates:  Problem: Diabetes      Relation: Mother          Age of Onset: (Not Specified)  Problem: Hypertension      Relation: Mother          Age of Onset: (Not Specified)      Current Outpatient Medications on File Prior to Visit:  fexofenadine (ALLEGRA) 180 MG tablet, Take 1 tablet (180 mg total) by mouth once daily., Disp: 90 tablet, Rfl: 1  gabapentin (NEURONTIN) 100 MG capsule, Take 1 capsule (100 mg total) by mouth every evening., Disp: 30 capsule, Rfl: 1    Allergies:  Review of patient's allergies indicates:  No Known Allergies              Review of Systems   Constitutional: Positive for fatigue. Negative for activity change and unexpected weight change.   HENT: Negative for hearing loss, rhinorrhea and trouble swallowing.    Eyes: Negative for discharge and visual disturbance.   Respiratory: Negative for cough, chest tightness, shortness of breath and wheezing.    Cardiovascular: Negative for chest pain and palpitations.   Gastrointestinal: Negative for blood in stool, constipation, diarrhea and vomiting.   Endocrine: Negative for polydipsia and polyuria.   Genitourinary: Negative for difficulty urinating, dysuria,  hematuria and menstrual problem.   Musculoskeletal: Positive for neck pain. Negative for arthralgias and joint swelling.   Neurological: Negative for weakness and headaches.   Psychiatric/Behavioral: Positive for dysphoric mood and sleep disturbance. Negative for confusion.       Objective:      Physical Exam   Constitutional: She is oriented to person, place, and time. She appears well-developed and well-nourished. No distress.   Eyes: No scleral icterus.   Neck: No tracheal deviation present. No thyromegaly present.   Cardiovascular: Normal rate, regular rhythm and normal heart sounds.   Pulmonary/Chest: Effort normal and breath sounds normal. No respiratory distress. She has no wheezes. She has no rales.   Abdominal: Soft. Bowel sounds are normal.   Musculoskeletal: She exhibits no edema.   Lymphadenopathy:     She has no cervical adenopathy.   Neurological: She is alert and oriented to person, place, and time.   Skin: Skin is warm and dry.   Psychiatric: She has a normal mood and affect.   Vitals reviewed.      Assessment:       1. Annual physical exam    2. Mild depression    3. Tobacco use disorder    4. Morbid obesity with BMI of 40.0-44.9, adult    5. Immunization due        Plan:       Karen was seen today for annual exam.    Diagnoses and all orders for this visit:    Annual physical exam  -     Counseled regarding age appropriate screenings and immunizations  -     Counseled regarding lifestyle modifications  -     Comprehensive metabolic panel; Future  -     TSH; Future  -     Lipid panel; Future  -     Ferritin; Future    Mild depression  -     buPROPion (WELLBUTRIN SR) 100 MG TBSR 12 hr tablet; Take 1 tablet (100 mg total) by mouth 2 (two) times daily. Discussed medication risks and benefits.     Tobacco use disorder  -     Discussed cessation  -     buPROPion (WELLBUTRIN SR) 100 MG TBSR 12 hr tablet; Take 1 tablet (100 mg total) by mouth 2 (two) times daily.    Morbid obesity with BMI of 40.0-44.9,  adult  -     Lifestyle modifications discussed    Immunization due  -     (In Office Administered) Tdap Vaccine  -     (In Office Administered) Pneumococcal Polysaccharide Vaccine (23 Valent) (SQ/IM)    RTC in 6-8 weeks for smoking cessation and depression.

## 2019-07-13 LAB
C TRACH DNA SPEC QL NAA+PROBE: NOT DETECTED
N GONORRHOEA DNA SPEC QL NAA+PROBE: NOT DETECTED

## 2019-07-15 LAB
HIV 1+2 AB+HIV1 P24 AG SERPL QL IA: NEGATIVE
RPR SER QL: NORMAL

## 2019-07-16 ENCOUNTER — PATIENT MESSAGE (OUTPATIENT)
Dept: INTERNAL MEDICINE | Facility: CLINIC | Age: 39
End: 2019-07-16

## 2019-07-18 LAB
HPV HR 12 DNA CVX QL NAA+PROBE: NEGATIVE
HPV16 AG SPEC QL: NEGATIVE
HPV18 DNA SPEC QL NAA+PROBE: NEGATIVE

## 2019-08-08 ENCOUNTER — PATIENT MESSAGE (OUTPATIENT)
Dept: INTERNAL MEDICINE | Facility: CLINIC | Age: 39
End: 2019-08-08

## 2019-08-08 DIAGNOSIS — F32.A MILD DEPRESSION: ICD-10-CM

## 2019-08-08 DIAGNOSIS — G47.00 INSOMNIA, UNSPECIFIED TYPE: Primary | ICD-10-CM

## 2019-08-09 ENCOUNTER — PATIENT MESSAGE (OUTPATIENT)
Dept: INTERNAL MEDICINE | Facility: CLINIC | Age: 39
End: 2019-08-09

## 2019-08-09 RX ORDER — TRAZODONE HYDROCHLORIDE 50 MG/1
50 TABLET ORAL NIGHTLY PRN
Qty: 30 TABLET | Refills: 3 | Status: SHIPPED | OUTPATIENT
Start: 2019-08-09 | End: 2020-06-09

## 2019-08-09 NOTE — PATIENT INSTRUCTIONS
Trazodone tablets  What is this medicine?  TRAZODONE (TRAZ oh done) is used to treat depression.  How should I use this medicine?  Take this medicine by mouth with a glass of water. Follow the directions on the prescription label. Take this medicine shortly after a meal or a light snack. Take your medicine at regular intervals. Do not take your medicine more often than directed. Do not stop taking this medicine suddenly except upon the advice of your doctor. Stopping this medicine too quickly may cause serious side effects or your condition may worsen.  A special MedGuide will be given to you by the pharmacist with each prescription and refill. Be sure to read this information carefully each time.  Talk to your pediatrician regarding the use of this medicine in children. Special care may be needed.  What side effects may I notice from receiving this medicine?  Side effects that you should report to your doctor or health care professional as soon as possible:  · allergic reactions like skin rash, itching or hives, swelling of the face, lips, or tongue  · fast, irregular heartbeat  · feeling faint or lightheaded, falls  · painful erections or other sexual dysfunction  · suicidal thoughts or other mood changes  · trembling  Side effects that usually do not require medical attention (report to your doctor or health care professional if they continue or are bothersome):  · constipation  · headache  · muscle aches or pains  · nausea, vomiting  · unusually weak or tired  What may interact with this medicine?  Do not take this medicine with any of the following medications:  · certain medicines for fungal infections like fluconazole, itraconazole, ketoconazole, posaconazole, voriconazole  · cisapride  · dofetilide  · dronedarone  · linezolid  · MAOIs like Carbex, Eldepryl, Marplan, Nardil, and Parnate  · mesoridazine  · methylene blue (injected into a vein)  · pimozide  · saquinavir  · thioridazine  · ziprasidone  This  medicine may also interact with the following medications:  · alcohol  · antiviral medicines for HIV or AIDS  · aspirin and aspirin-like medicines  · barbiturates like phenobarbital  · certain medicines for blood pressure, heart disease, irregular heart beat  · certain medicines for depression, anxiety, or psychotic disturbances  · certain medicines for migraine headache like almotriptan, eletriptan, frovatriptan, naratriptan, rizatriptan, sumatriptan, zolmitriptan  · certain medicines for seizures like carbamazepine and phenytoin  · certain medicines for sleep  · certain medicines that treat or prevent blood clots like dalteparin, enoxaparin, warfarin  · digoxin  · fentanyl  · lithium  · NSAIDS, medicines for pain and inflammation, like ibuprofen or naproxen  · other medicines that prolong the QT interval (cause an abnormal heart rhythm)  · rasagiline  · supplements like Luzma's wort, kava kava, valerian  · tramadol  · tryptophan  What if I miss a dose?  If you miss a dose, take it as soon as you can. If it is almost time for your next dose, take only that dose. Do not take double or extra doses.  Where should I keep my medicine?  Keep out of the reach of children.  Store at room temperature between 15 and 30 degrees C (59 to 86 degrees F). Protect from light. Keep container tightly closed. Throw away any unused medicine after the expiration date.  What should I tell my health care provider before I take this medicine?  They need to know if you have any of these conditions:  · attempted suicide or thinking about it  · bipolar disorder  · bleeding problems  · glaucoma  · heart disease, or previous heart attack  · irregular heart beat  · kidney or liver disease  · low levels of sodium in the blood  · an unusual or allergic reaction to trazodone, other medicines, foods, dyes or preservatives  · pregnant or trying to get pregnant  · breast-feeding  What should I watch for while using this medicine?  Tell your doctor  if your symptoms do not get better or if they get worse. Visit your doctor or health care professional for regular checks on your progress. Because it may take several weeks to see the full effects of this medicine, it is important to continue your treatment as prescribed by your doctor.  Patients and their families should watch out for new or worsening thoughts of suicide or depression. Also watch out for sudden changes in feelings such as feeling anxious, agitated, panicky, irritable, hostile, aggressive, impulsive, severely restless, overly excited and hyperactive, or not being able to sleep. If this happens, especially at the beginning of treatment or after a change in dose, call your health care professional.  You may get drowsy or dizzy. Do not drive, use machinery, or do anything that needs mental alertness until you know how this medicine affects you. Do not stand or sit up quickly, especially if you are an older patient. This reduces the risk of dizzy or fainting spells. Alcohol may interfere with the effect of this medicine. Avoid alcoholic drinks.  This medicine may cause dry eyes and blurred vision. If you wear contact lenses you may feel some discomfort. Lubricating drops may help. See your eye doctor if the problem does not go away or is severe.  Your mouth may get dry. Chewing sugarless gum, sucking hard candy and drinking plenty of water may help. Contact your doctor if the problem does not go away or is severe.  NOTE:This sheet is a summary. It may not cover all possible information. If you have questions about this medicine, talk to your doctor, pharmacist, or health care provider. Copyright© 2017 [employer]

## 2019-09-05 ENCOUNTER — PATIENT MESSAGE (OUTPATIENT)
Dept: INTERNAL MEDICINE | Facility: CLINIC | Age: 39
End: 2019-09-05

## 2019-10-07 ENCOUNTER — HOSPITAL ENCOUNTER (EMERGENCY)
Facility: HOSPITAL | Age: 39
Discharge: HOME | End: 2019-10-07
Attending: EMERGENCY MEDICINE
Payer: COMMERCIAL

## 2019-10-07 VITALS
SYSTOLIC BLOOD PRESSURE: 171 MMHG | DIASTOLIC BLOOD PRESSURE: 97 MMHG | OXYGEN SATURATION: 99 % | WEIGHT: 282.19 LBS | HEART RATE: 64 BPM | HEIGHT: 69 IN | TEMPERATURE: 98 F | BODY MASS INDEX: 41.8 KG/M2 | RESPIRATION RATE: 18 BRPM

## 2019-10-07 DIAGNOSIS — R03.0 ELEVATED BLOOD PRESSURE READING: ICD-10-CM

## 2019-10-07 DIAGNOSIS — Z72.0 TOBACCO ABUSE: Primary | ICD-10-CM

## 2019-10-07 DIAGNOSIS — M54.6 ACUTE RIGHT-SIDED THORACIC BACK PAIN: ICD-10-CM

## 2019-10-07 DIAGNOSIS — R07.9 CHEST PAIN: ICD-10-CM

## 2019-10-07 LAB
ALBUMIN SERPL BCP-MCNC: 3.6 G/DL (ref 3.5–5.2)
ALP SERPL-CCNC: 75 U/L (ref 55–135)
ALT SERPL W/O P-5'-P-CCNC: 10 U/L (ref 10–44)
ANION GAP SERPL CALC-SCNC: 10 MMOL/L (ref 8–16)
AST SERPL-CCNC: 10 U/L (ref 10–40)
B-HCG UR QL: NEGATIVE
BASOPHILS # BLD AUTO: 0.03 K/UL (ref 0–0.2)
BASOPHILS NFR BLD: 0.4 % (ref 0–1.9)
BILIRUB SERPL-MCNC: 0.2 MG/DL (ref 0.1–1)
BILIRUB UR QL STRIP: NEGATIVE
BNP SERPL-MCNC: <10 PG/ML (ref 0–99)
BUN SERPL-MCNC: 6 MG/DL (ref 6–20)
CALCIUM SERPL-MCNC: 9.1 MG/DL (ref 8.7–10.5)
CHLORIDE SERPL-SCNC: 108 MMOL/L (ref 95–110)
CLARITY UR: CLEAR
CO2 SERPL-SCNC: 18 MMOL/L (ref 23–29)
COLOR UR: YELLOW
CREAT SERPL-MCNC: 0.9 MG/DL (ref 0.5–1.4)
DIFFERENTIAL METHOD: ABNORMAL
EOSINOPHIL # BLD AUTO: 0.2 K/UL (ref 0–0.5)
EOSINOPHIL NFR BLD: 1.8 % (ref 0–8)
ERYTHROCYTE [DISTWIDTH] IN BLOOD BY AUTOMATED COUNT: 13.6 % (ref 11.5–14.5)
EST. GFR  (AFRICAN AMERICAN): >60 ML/MIN/1.73 M^2
EST. GFR  (NON AFRICAN AMERICAN): >60 ML/MIN/1.73 M^2
GLUCOSE SERPL-MCNC: 103 MG/DL (ref 70–110)
GLUCOSE UR QL STRIP: NEGATIVE
HCT VFR BLD AUTO: 39.5 % (ref 37–48.5)
HGB BLD-MCNC: 12.8 G/DL (ref 12–16)
HGB UR QL STRIP: ABNORMAL
IMM GRANULOCYTES # BLD AUTO: 0.02 K/UL (ref 0–0.04)
IMM GRANULOCYTES NFR BLD AUTO: 0.2 % (ref 0–0.5)
INR PPP: 0.9 (ref 0.8–1.2)
KETONES UR QL STRIP: NEGATIVE
LEUKOCYTE ESTERASE UR QL STRIP: NEGATIVE
LYMPHOCYTES # BLD AUTO: 3.9 K/UL (ref 1–4.8)
LYMPHOCYTES NFR BLD: 47.7 % (ref 18–48)
MCH RBC QN AUTO: 29.6 PG (ref 27–31)
MCHC RBC AUTO-ENTMCNC: 32.4 G/DL (ref 32–36)
MCV RBC AUTO: 91 FL (ref 82–98)
MONOCYTES # BLD AUTO: 0.5 K/UL (ref 0.3–1)
MONOCYTES NFR BLD: 5.6 % (ref 4–15)
NEUTROPHILS # BLD AUTO: 3.7 K/UL (ref 1.8–7.7)
NEUTROPHILS NFR BLD: 44.3 % (ref 38–73)
NITRITE UR QL STRIP: NEGATIVE
NRBC BLD-RTO: 0 /100 WBC
PH UR STRIP: 6 [PH] (ref 5–8)
PLATELET # BLD AUTO: 343 K/UL (ref 150–350)
PMV BLD AUTO: 8.7 FL (ref 9.2–12.9)
POTASSIUM SERPL-SCNC: 3.9 MMOL/L (ref 3.5–5.1)
PROT SERPL-MCNC: 7.5 G/DL (ref 6–8.4)
PROT UR QL STRIP: NEGATIVE
PROTHROMBIN TIME: 9.6 SEC (ref 9–12.5)
RBC # BLD AUTO: 4.33 M/UL (ref 4–5.4)
SODIUM SERPL-SCNC: 136 MMOL/L (ref 136–145)
SP GR UR STRIP: 1.01 (ref 1–1.03)
TROPONIN I SERPL DL<=0.01 NG/ML-MCNC: 0.01 NG/ML (ref 0–0.03)
URN SPEC COLLECT METH UR: ABNORMAL
UROBILINOGEN UR STRIP-ACNC: NEGATIVE EU/DL
WBC # BLD AUTO: 8.26 K/UL (ref 3.9–12.7)

## 2019-10-07 PROCEDURE — 93010 ELECTROCARDIOGRAM REPORT: CPT | Mod: ,,, | Performed by: INTERNAL MEDICINE

## 2019-10-07 PROCEDURE — 85610 PROTHROMBIN TIME: CPT

## 2019-10-07 PROCEDURE — 93010 EKG 12-LEAD: ICD-10-PCS | Mod: ,,, | Performed by: INTERNAL MEDICINE

## 2019-10-07 PROCEDURE — 81025 URINE PREGNANCY TEST: CPT

## 2019-10-07 PROCEDURE — 99285 EMERGENCY DEPT VISIT HI MDM: CPT | Mod: 25

## 2019-10-07 PROCEDURE — 80053 COMPREHEN METABOLIC PANEL: CPT

## 2019-10-07 PROCEDURE — 81003 URINALYSIS AUTO W/O SCOPE: CPT

## 2019-10-07 PROCEDURE — 93005 ELECTROCARDIOGRAM TRACING: CPT

## 2019-10-07 PROCEDURE — 25000003 PHARM REV CODE 250: Performed by: EMERGENCY MEDICINE

## 2019-10-07 PROCEDURE — 83880 ASSAY OF NATRIURETIC PEPTIDE: CPT

## 2019-10-07 PROCEDURE — 85025 COMPLETE CBC W/AUTO DIFF WBC: CPT

## 2019-10-07 PROCEDURE — 84484 ASSAY OF TROPONIN QUANT: CPT

## 2019-10-07 RX ORDER — NAPROXEN SODIUM 220 MG/1
324 TABLET, FILM COATED ORAL ONCE
Status: DISCONTINUED | OUTPATIENT
Start: 2019-10-07 | End: 2019-10-07

## 2019-10-07 RX ORDER — KETOROLAC TROMETHAMINE 10 MG/1
10 TABLET, FILM COATED ORAL
Status: COMPLETED | OUTPATIENT
Start: 2019-10-07 | End: 2019-10-07

## 2019-10-07 RX ORDER — CYCLOBENZAPRINE HCL 10 MG
10 TABLET ORAL 3 TIMES DAILY PRN
Qty: 30 TABLET | Refills: 0 | Status: SHIPPED | OUTPATIENT
Start: 2019-10-07 | End: 2020-01-03

## 2019-10-07 RX ORDER — NAPROXEN 500 MG/1
500 TABLET ORAL 2 TIMES DAILY PRN
Qty: 28 TABLET | Refills: 0 | Status: SHIPPED | OUTPATIENT
Start: 2019-10-07 | End: 2019-10-21

## 2019-10-07 RX ADMIN — KETOROLAC TROMETHAMINE 10 MG: 10 TABLET, FILM COATED ORAL at 05:10

## 2019-10-07 NOTE — ED NOTES
Patient states she does not have chest pain and that she has muscle tightness in her neck and down the right side of her back.

## 2019-10-07 NOTE — ED NOTES
Patient identifiers verified and correct for Karen ESPINOSA Ronni. Patient has complaints of neck and back tightness, not chest pain.     LOC: The patient is awake, alert and aware of environment with an appropriate affect, the patient is oriented x 3 and speaking appropriately.  APPEARANCE: Patient resting comfortably and in no acute distress, patient is clean and well groomed, patient's clothing is properly fastened.  SKIN: The skin is warm and dry, color consistent with ethnicity, patient has normal skin turgor and moist mucus membranes, skin intact, no breakdown or bruising noted.  MUSCULOSKELETAL: Patient moving all extremities spontaneously.  RESPIRATORY: Airway is open and patent, respirations are spontaneous.  CARDIAC: Patient has a normal rate, no periphreal edema noted, capillary refill < 3 seconds.  ABDOMEN: Soft and non tender to palpation.

## 2019-10-07 NOTE — ED NOTES
No cardiac monitored beds available at this time.   Maye RN, charge nurse notified and Dr Rodríguez notified

## 2019-10-07 NOTE — ED PROVIDER NOTES
SCRIBE #1 NOTE: I, Roslyn Amato, am scribing for, and in the presence of, Joy Gonsalez DO. I have scribed the entire note.       History     Chief Complaint   Patient presents with    Chest Pain     c/o chest pain and back pain since Saturday      Review of patient's allergies indicates:  No Known Allergies      History of Present Illness     HPI    10/7/2019, 4:34 PM  History obtained from the patient      History of Present Illness: Karen Rudolph is a 39 y.o. female patient (current daily smoker) with a PMHx of chronic back pain and cholelithiasis who presents to the Emergency Department for evaluation of bilateral back tightness that radiates to the right breast.  Pain began on Saturday, lasted all day, receded, came back on , receded, and then came back today while the pt was working on the phone. Additionally, pt reports RUE numbness/ tingling that radiates down from the R shoulder. Pain is described as a dull, sharp, tight pain. LMP was 19. Symptoms are intermittent and moderate in severity. No mitigating or exacerbating factors reported. Associated sxs include RUE numbness/ tingling, R ribcage tightness and upper back bilateral tightness. Patient denies any palpations, facial numbness, weakness of the upper extremity, BLE edema, gait abnormality, dizziness, HA, abdominal pain, n/v/d, dysuria, hematuria, syncope, dysphagia, speech difficulty and all other sxs at this time. No prior tx reported. No further complaints or concerns at this time.     Arrival mode: Personal vehicle      PCP: Lula Ng DO        Past Medical History:  Past Medical History:   Diagnosis Date    Cholelithiasis     Chronic back pain        Past Surgical History:  Past Surgical History:   Procedure Laterality Date     SECTION      COLPOSCOPY           Family History:  Family History   Problem Relation Age of Onset    Diabetes Mother     Hypertension Mother        Social History:  Social History  "    Tobacco Use    Smoking status: Current Every Day Smoker     Types: Cigarettes    Smokeless tobacco: Current User   Substance and Sexual Activity    Alcohol use: Yes     Alcohol/week: 0.0 standard drinks     Frequency: 2-4 times a month     Drinks per session: 3 or 4     Binge frequency: Less than monthly    Drug use: No    Sexual activity: Yes     Partners: Male        Review of Systems     Review of Systems   Constitutional: Negative for chills and fever.   HENT: Negative for congestion, sore throat and trouble swallowing.    Respiratory: Negative for cough and shortness of breath.         + R ribcage tightness   Cardiovascular: Positive for chest pain ("under R breast"). Negative for palpitations and leg swelling (BLE).   Gastrointestinal: Negative for abdominal pain, nausea and vomiting.   Genitourinary: Negative for dysuria and hematuria.   Musculoskeletal: Positive for back pain (Bilateral upper back tightness). Negative for gait problem.   Skin: Negative for rash.   Neurological: Negative for dizziness, syncope, speech difficulty, weakness and headaches.        + RUE numbness/ tingling (radiating from R shoulder)   Hematological: Does not bruise/bleed easily.   All other systems reviewed and are negative.     Physical Exam     Initial Vitals [10/07/19 1450]   BP Pulse Resp Temp SpO2   (!) 161/95 70 18 98.3 °F (36.8 °C) 100 %      MAP       --          Physical Exam   Pulmonary/Chest:             Nursing Notes and Vital Signs Reviewed.   Constitutional: Patient is in no acute distress. Well-developed and well-nourished.  Head: Atraumatic. Normocephalic.  Eyes: PERRL. EOM intact. Conjunctivae are not pale. No scleral icterus.  ENT: Mucous membranes are moist. Oropharynx is clear and symmetric.    Neck: Supple. Full ROM. No lymphadenopathy.  Cardiovascular: Regular rate. Regular rhythm. No murmurs, rubs, or gallops. Distal pulses are 2+ and symmetric.  Pulmonary/Chest: No respiratory distress. Clear to " "auscultation bilaterally. No wheezing or rales.  Abdominal: Soft and non-distended.  There is no tenderness.  No rebound, guarding, or rigidity. Good bowel sounds.  Genitourinary: No CVA tenderness  Musculoskeletal: Moves all extremities. No obvious deformities. No edema. No calf tenderness. Musculoskeletal tenderness of the R trapezius and R flank region.  Skin: Warm and dry.  Neurological:  Alert, awake, and appropriate.  Normal speech.  No acute focal neurological deficits are appreciated. Cranial nerves 2-12 intact.  GCS 15.  NIH Stroke Scale equals 0.  Psychiatric: Normal affect. Good eye contact. Appropriate in content.     ED Course   Procedures  ED Vital Signs:  Vitals:    10/07/19 1450 10/07/19 1454 10/07/19 1521 10/07/19 1742   BP: (!) 161/95   (!) 186/101   Pulse: 70 68  69   Resp: 18   16   Temp: 98.3 °F (36.8 °C)   98.4 °F (36.9 °C)   TempSrc: Oral   Oral   SpO2: 100%   100%   Weight: 128 kg (282 lb 3 oz)      Height:   5' 9" (1.753 m)     10/07/19 1820 10/07/19 1822   BP: (!) 156/90 (!) 171/97   Pulse: 62 64   Resp: 18 18   Temp: 98.3 °F (36.8 °C) 98.1 °F (36.7 °C)   TempSrc: Oral Oral   SpO2: 100% 99%   Weight:     Height:         Abnormal Lab Results:  Labs Reviewed   CBC W/ AUTO DIFFERENTIAL - Abnormal; Notable for the following components:       Result Value    MPV 8.7 (*)     All other components within normal limits   COMPREHENSIVE METABOLIC PANEL - Abnormal; Notable for the following components:    CO2 18 (*)     All other components within normal limits   URINALYSIS, REFLEX TO URINE CULTURE - Abnormal; Notable for the following components:    Occult Blood UA Trace (*)     All other components within normal limits    Narrative:     Preferred Collection Type->Urine, Clean Catch   B-TYPE NATRIURETIC PEPTIDE   TROPONIN I   PROTIME-INR   PREGNANCY TEST, URINE RAPID        All Lab Results:  Results for orders placed or performed during the hospital encounter of 10/07/19   CBC auto differential "   Result Value Ref Range    WBC 8.26 3.90 - 12.70 K/uL    RBC 4.33 4.00 - 5.40 M/uL    Hemoglobin 12.8 12.0 - 16.0 g/dL    Hematocrit 39.5 37.0 - 48.5 %    Mean Corpuscular Volume 91 82 - 98 fL    Mean Corpuscular Hemoglobin 29.6 27.0 - 31.0 pg    Mean Corpuscular Hemoglobin Conc 32.4 32.0 - 36.0 g/dL    RDW 13.6 11.5 - 14.5 %    Platelets 343 150 - 350 K/uL    MPV 8.7 (L) 9.2 - 12.9 fL    Immature Granulocytes 0.2 0.0 - 0.5 %    Gran # (ANC) 3.7 1.8 - 7.7 K/uL    Immature Grans (Abs) 0.02 0.00 - 0.04 K/uL    Lymph # 3.9 1.0 - 4.8 K/uL    Mono # 0.5 0.3 - 1.0 K/uL    Eos # 0.2 0.0 - 0.5 K/uL    Baso # 0.03 0.00 - 0.20 K/uL    nRBC 0 0 /100 WBC    Gran% 44.3 38.0 - 73.0 %    Lymph% 47.7 18.0 - 48.0 %    Mono% 5.6 4.0 - 15.0 %    Eosinophil% 1.8 0.0 - 8.0 %    Basophil% 0.4 0.0 - 1.9 %    Differential Method Automated    Comprehensive metabolic panel   Result Value Ref Range    Sodium 136 136 - 145 mmol/L    Potassium 3.9 3.5 - 5.1 mmol/L    Chloride 108 95 - 110 mmol/L    CO2 18 (L) 23 - 29 mmol/L    Glucose 103 70 - 110 mg/dL    BUN, Bld 6 6 - 20 mg/dL    Creatinine 0.9 0.5 - 1.4 mg/dL    Calcium 9.1 8.7 - 10.5 mg/dL    Total Protein 7.5 6.0 - 8.4 g/dL    Albumin 3.6 3.5 - 5.2 g/dL    Total Bilirubin 0.2 0.1 - 1.0 mg/dL    Alkaline Phosphatase 75 55 - 135 U/L    AST 10 10 - 40 U/L    ALT 10 10 - 44 U/L    Anion Gap 10 8 - 16 mmol/L    eGFR if African American >60 >60 mL/min/1.73 m^2    eGFR if non African American >60 >60 mL/min/1.73 m^2   Brain natriuretic peptide   Result Value Ref Range    BNP <10 0 - 99 pg/mL   Troponin I   Result Value Ref Range    Troponin I 0.010 0.000 - 0.026 ng/mL   Protime-INR   Result Value Ref Range    Prothrombin Time 9.6 9.0 - 12.5 sec    INR 0.9 0.8 - 1.2   Pregnancy, urine rapid   Result Value Ref Range    Preg Test, Ur Negative    Urinalysis, Reflex to Urine Culture Urine, Clean Catch   Result Value Ref Range    Specimen UA Urine, Clean Catch     Color, UA Yellow Yellow, Straw,  Maribel    Appearance, UA Clear Clear    pH, UA 6.0 5.0 - 8.0    Specific Gravity, UA 1.015 1.005 - 1.030    Protein, UA Negative Negative    Glucose, UA Negative Negative    Ketones, UA Negative Negative    Bilirubin (UA) Negative Negative    Occult Blood UA Trace (A) Negative    Nitrite, UA Negative Negative    Urobilinogen, UA Negative <2.0 EU/dL    Leukocytes, UA Negative Negative       Imaging Results:  Imaging Results          X-Ray Chest PA And Lateral (Final result)  Result time 10/07/19 16:19:07    Final result by Romario Elizalde MD (10/07/19 16:19:07)                 Impression:      No acute cardiopulmonary disease.      Electronically signed by: Romario Elizalde  Date:    10/07/2019  Time:    16:19             Narrative:    EXAMINATION:  XR CHEST PA AND LATERAL    CLINICAL HISTORY:  Chest Pain;    COMPARISON:  None    FINDINGS:  The heart is normal and the lungs are clear.                                 The EKG was ordered, reviewed, and independently interpreted by the ED provider.  Interpretation time: 14:54  Rate: 68 BPM  Rhythm: SR with marked sinus arrhythmia  Interpretation: Nonspecific T wave abnormality. Abnormal ECG. No STEMI.           The Emergency Provider reviewed the vital signs and test results, which are outlined above.     ED Discussion     7:30 PM: Reassessed pt at this time.  Pt states her condition has improved at this time. Discussed with pt all pertinent ED information and results. Discussed pt dx and plan of tx. Gave pt all f/u and return to the ED instructions. All questions and concerns were addressed at this time. Pt expresses understanding of information and instructions, and is comfortable with plan to discharge. Pt is stable for discharge.    I have discussed with patient and/or family/caretaker chest pain precautions, specifically to return for worsening chest pain, shortness of breath, fever, or any concern.  I have low suspicion for cardiopulmonary, vascular, infectious,  respiratory, or other emergent medical condition based on my evaluation in the ED.    I discussed with patient and/or family/caretaker that evaluation in the ED does not suggest any emergent or life threatening medical conditions requiring immediate intervention beyond what was provided in the ED, and I believe patient is safe for discharge.  Regardless, an unremarkable evaluation in the ED does not preclude the development or presence of a serious of life threatening condition. As such, patient was instructed to return immediately for any worsening or change in current symptoms.         Medical Decision Making:   Clinical Tests:   Lab Tests: Ordered and Reviewed  Radiological Study: Ordered and Reviewed  Medical Tests: Ordered and Reviewed     Additional MDM:   Smoking Cessation: The patient is a smoker. The patient was counseled on smoking cessation for: 4 minutes. The patient was counseled on tobacco related  health complications. The patient was counseled on how exercise will aide in tobacco cessation. The patient was counseled on the adverse effects of second hand smoke. The patient was counseled on hazards of smoking while driving.        ED Medication(s):  Medications   ketorolac tablet 10 mg (10 mg Oral Given 10/7/19 1713)       Discharge Medication List as of 10/7/2019  6:18 PM      START taking these medications    Details   cyclobenzaprine (FLEXERIL) 10 MG tablet Take 1 tablet (10 mg total) by mouth 3 (three) times daily as needed for Muscle spasms. Sedation warning, Starting Mon 10/7/2019, Print      naproxen (NAPROSYN) 500 MG tablet Take 1 tablet (500 mg total) by mouth 2 (two) times daily as needed (pain)., Starting Mon 10/7/2019, Until Mon 10/21/2019, Print             Follow-up Information     Lula Ng DO In 2 days.    Specialty:  Internal Medicine  Why:  Consider warm compresses to back.  Return to emergency department for:  Weakness of the extremity, worsening pain, fever, worsening symptoms,  shortness of breath, or other concerns.  Contact information:  [id number] [place] [address] [address] JOY [id number]  [phone]                       Scribe Attestation:   Scribe #1: I performed the above scribed service and the documentation accurately describes the services I performed. I attest to the accuracy of the note.     Attending:   Physician Attestation Statement for Scribe #1: I, Joy Gonsalez DO, personally performed the services described in this documentation, as scribed by Roslyn Amato, in my presence, and it is both accurate and complete.           Clinical Impression       ICD-10-CM ICD-9-CM   1. Tobacco abuse Z72.0 305.1   2. Chest pain R07.9 786.50   3. Elevated blood pressure reading R03.0 796.2   4. Acute right-sided thoracic back pain M54.6 724.1       Disposition:   Disposition: Discharged  Condition: Stable         Joy Gonsalez DO  10/08/19 0350

## 2019-11-01 ENCOUNTER — PATIENT MESSAGE (OUTPATIENT)
Dept: INTERNAL MEDICINE | Facility: CLINIC | Age: 39
End: 2019-11-01

## 2019-11-01 NOTE — TELEPHONE ENCOUNTER
Pt is requesting medication for a cough and congestion informed her that she will need to be seen to be evaluated.

## 2019-11-17 ENCOUNTER — PATIENT MESSAGE (OUTPATIENT)
Dept: OBSTETRICS AND GYNECOLOGY | Facility: CLINIC | Age: 39
End: 2019-11-17

## 2019-11-19 ENCOUNTER — PATIENT MESSAGE (OUTPATIENT)
Dept: OBSTETRICS AND GYNECOLOGY | Facility: CLINIC | Age: 39
End: 2019-11-19

## 2019-11-22 ENCOUNTER — OFFICE VISIT (OUTPATIENT)
Dept: OBSTETRICS AND GYNECOLOGY | Facility: CLINIC | Age: 39
End: 2019-11-22
Payer: COMMERCIAL

## 2019-11-22 VITALS
SYSTOLIC BLOOD PRESSURE: 122 MMHG | DIASTOLIC BLOOD PRESSURE: 88 MMHG | BODY MASS INDEX: 42.06 KG/M2 | WEIGHT: 284.81 LBS

## 2019-11-22 DIAGNOSIS — N92.1 MENORRHAGIA WITH IRREGULAR CYCLE: Primary | ICD-10-CM

## 2019-11-22 DIAGNOSIS — N92.6 IRREGULAR MENSES: ICD-10-CM

## 2019-11-22 PROCEDURE — 3008F BODY MASS INDEX DOCD: CPT | Mod: CPTII,S$GLB,, | Performed by: OBSTETRICS & GYNECOLOGY

## 2019-11-22 PROCEDURE — 99213 OFFICE O/P EST LOW 20 MIN: CPT | Mod: S$GLB,,, | Performed by: OBSTETRICS & GYNECOLOGY

## 2019-11-22 PROCEDURE — 99999 PR PBB SHADOW E&M-EST. PATIENT-LVL III: CPT | Mod: PBBFAC,,, | Performed by: OBSTETRICS & GYNECOLOGY

## 2019-11-22 PROCEDURE — 3008F PR BODY MASS INDEX (BMI) DOCUMENTED: ICD-10-PCS | Mod: CPTII,S$GLB,, | Performed by: OBSTETRICS & GYNECOLOGY

## 2019-11-22 PROCEDURE — 99213 PR OFFICE/OUTPT VISIT, EST, LEVL III, 20-29 MIN: ICD-10-PCS | Mod: S$GLB,,, | Performed by: OBSTETRICS & GYNECOLOGY

## 2019-11-22 PROCEDURE — 99999 PR PBB SHADOW E&M-EST. PATIENT-LVL III: ICD-10-PCS | Mod: PBBFAC,,, | Performed by: OBSTETRICS & GYNECOLOGY

## 2019-11-22 RX ORDER — GABAPENTIN 100 MG/1
100 CAPSULE ORAL
COMMUNITY
End: 2020-04-16 | Stop reason: SDUPTHER

## 2019-11-23 ENCOUNTER — LAB VISIT (OUTPATIENT)
Dept: LAB | Facility: HOSPITAL | Age: 39
End: 2019-11-23
Attending: OBSTETRICS & GYNECOLOGY
Payer: COMMERCIAL

## 2019-11-23 DIAGNOSIS — N92.6 IRREGULAR MENSES: ICD-10-CM

## 2019-11-23 DIAGNOSIS — N92.1 MENORRHAGIA WITH IRREGULAR CYCLE: ICD-10-CM

## 2019-11-23 LAB
BASOPHILS # BLD AUTO: 0.04 K/UL (ref 0–0.2)
BASOPHILS NFR BLD: 0.7 % (ref 0–1.9)
DIFFERENTIAL METHOD: ABNORMAL
EOSINOPHIL # BLD AUTO: 0.2 K/UL (ref 0–0.5)
EOSINOPHIL NFR BLD: 2.4 % (ref 0–8)
ERYTHROCYTE [DISTWIDTH] IN BLOOD BY AUTOMATED COUNT: 13.8 % (ref 11.5–14.5)
FSH SERPL-ACNC: 5.5 MIU/ML
HCT VFR BLD AUTO: 41 % (ref 37–48.5)
HGB BLD-MCNC: 13 G/DL (ref 12–16)
IMM GRANULOCYTES # BLD AUTO: 0.01 K/UL (ref 0–0.04)
IMM GRANULOCYTES NFR BLD AUTO: 0.2 % (ref 0–0.5)
LH SERPL-ACNC: 3.3 MIU/ML
LYMPHOCYTES # BLD AUTO: 2.7 K/UL (ref 1–4.8)
LYMPHOCYTES NFR BLD: 44.4 % (ref 18–48)
MCH RBC QN AUTO: 30.1 PG (ref 27–31)
MCHC RBC AUTO-ENTMCNC: 31.7 G/DL (ref 32–36)
MCV RBC AUTO: 95 FL (ref 82–98)
MONOCYTES # BLD AUTO: 0.3 K/UL (ref 0.3–1)
MONOCYTES NFR BLD: 4.9 % (ref 4–15)
NEUTROPHILS # BLD AUTO: 2.9 K/UL (ref 1.8–7.7)
NEUTROPHILS NFR BLD: 47.4 % (ref 38–73)
NRBC BLD-RTO: 0 /100 WBC
PLATELET # BLD AUTO: 382 K/UL (ref 150–350)
PMV BLD AUTO: 9.1 FL (ref 9.2–12.9)
RBC # BLD AUTO: 4.32 M/UL (ref 4–5.4)
TSH SERPL DL<=0.005 MIU/L-ACNC: 1.08 UIU/ML (ref 0.4–4)
WBC # BLD AUTO: 6.15 K/UL (ref 3.9–12.7)

## 2019-11-23 PROCEDURE — 36415 COLL VENOUS BLD VENIPUNCTURE: CPT

## 2019-11-23 PROCEDURE — 83001 ASSAY OF GONADOTROPIN (FSH): CPT

## 2019-11-23 PROCEDURE — 83002 ASSAY OF GONADOTROPIN (LH): CPT

## 2019-11-23 PROCEDURE — 84443 ASSAY THYROID STIM HORMONE: CPT

## 2019-11-23 PROCEDURE — 85025 COMPLETE CBC W/AUTO DIFF WBC: CPT

## 2019-12-05 NOTE — PROGRESS NOTES
HPI:  39 y.o. year old female patient  presents today for complaint of irregular menses over past several weeks.  She normally has regular menses every month lasting 4-5 days, but has just had 2 1/2 weeks of daily bleeding.  She smokes 2 ppd.  No pelvic pain.  No other complaints.      Past Medical History:   Diagnosis Date    Cholelithiasis     Chronic back pain        Past Surgical History:   Procedure Laterality Date     SECTION      COLPOSCOPY         Review of Systems:    Constitutional:  Negative for fatigue and unexpected weight change  Breasts:  Negative for masses, tenderness, or nipple discharge  Gastrointestinal:  Negative for nausea, appetite change, and abdominal distension  Genitourinary:  Negative for dysuria, frequency, or urgency  Gyn:  Negative for pelvic pain, or vaginal discharge         Physical Exam:    Constitutional:  She appears well developed and well nourished.  No distress  Abdominal:  Soft, no distension.  No tenderness.  No masses  Pelvic:  Vulva:  No lesions.  Normal female genitalia  Urethral Meatus:  Normal size and location.  No lesions.  No prolapse.  Urethra:  No masses, tenderness, prolapse, or scarring  Vagina:  No lesions or discharge.  No significant cystocele or rectocele.  Cervix:  Surgically absent  Uterus:  Surgically absent  Adnexa:  No masses or tenderness  Anus and Perineum:  No lesions.  No external hemorrhoids      ASSESSMENT:  1. Menorrhagia with irregular cycle  TSH    US Pelvis Comp with Transvag NON-OB (xpd    CBC auto differential   2. Irregular menses  Follicle stimulating hormone    Luteinizing hormone   :      PLAN:  Check TSH and CBC  Pelvic u/s

## 2019-12-07 ENCOUNTER — HOSPITAL ENCOUNTER (OUTPATIENT)
Dept: RADIOLOGY | Facility: HOSPITAL | Age: 39
Discharge: HOME | End: 2019-12-07
Attending: OBSTETRICS & GYNECOLOGY
Payer: COMMERCIAL

## 2019-12-07 DIAGNOSIS — N92.1 MENORRHAGIA WITH IRREGULAR CYCLE: ICD-10-CM

## 2019-12-07 PROCEDURE — 76856 US EXAM PELVIC COMPLETE: CPT | Mod: 26,,, | Performed by: RADIOLOGY

## 2019-12-07 PROCEDURE — 76830 TRANSVAGINAL US NON-OB: CPT | Mod: 26,,, | Performed by: RADIOLOGY

## 2019-12-07 PROCEDURE — 76830 US PELVIS COMP WITH TRANSVAG NON-OB (XPD): ICD-10-PCS | Mod: 26,,, | Performed by: RADIOLOGY

## 2019-12-07 PROCEDURE — 76856 US PELVIS COMP WITH TRANSVAG NON-OB (XPD): ICD-10-PCS | Mod: 26,,, | Performed by: RADIOLOGY

## 2019-12-07 PROCEDURE — 76830 TRANSVAGINAL US NON-OB: CPT | Mod: TC

## 2019-12-09 ENCOUNTER — PATIENT MESSAGE (OUTPATIENT)
Dept: OBSTETRICS AND GYNECOLOGY | Facility: CLINIC | Age: 39
End: 2019-12-09

## 2019-12-10 RX ORDER — ACETAMINOPHEN AND CODEINE PHOSPHATE 120; 12 MG/5ML; MG/5ML
1 SOLUTION ORAL DAILY
Qty: 28 TABLET | Refills: 11 | Status: SHIPPED | OUTPATIENT
Start: 2019-12-10 | End: 2020-01-02

## 2019-12-23 ENCOUNTER — TELEPHONE (OUTPATIENT)
Dept: OBSTETRICS AND GYNECOLOGY | Facility: CLINIC | Age: 39
End: 2019-12-23

## 2019-12-23 ENCOUNTER — PATIENT MESSAGE (OUTPATIENT)
Dept: OBSTETRICS AND GYNECOLOGY | Facility: CLINIC | Age: 39
End: 2019-12-23

## 2019-12-23 RX ORDER — MEDROXYPROGESTERONE ACETATE 10 MG/1
10 TABLET ORAL DAILY
Qty: 10 TABLET | Refills: 0 | Status: SHIPPED | OUTPATIENT
Start: 2019-12-23 | End: 2020-01-02

## 2019-12-23 NOTE — TELEPHONE ENCOUNTER
Pt's last visit with you was 7/12/19, saw Pita on 11/22/19 for menorrhagia. Does this need to be addressed by Pita. Please advise.

## 2019-12-23 NOTE — TELEPHONE ENCOUNTER
Spoke with patient, patient stated that she was having severe pain, and she needed to f/u with someone today. Call was transferred to HIMA Shelley NP. Was informed by HIMA Shelley that patient is refusing to go to the ED. Patient requested to get in this week for an appointment with one of the MD's. After checking the [facility] location, and [facility], informed patient that there are not any appts available. Patient stated that she will head to Urgent Care.

## 2020-01-02 ENCOUNTER — PATIENT MESSAGE (OUTPATIENT)
Dept: OBSTETRICS AND GYNECOLOGY | Facility: CLINIC | Age: 40
End: 2020-01-02

## 2020-01-02 DIAGNOSIS — Z30.013 ENCOUNTER FOR INITIAL PRESCRIPTION OF INJECTABLE CONTRACEPTIVE: Primary | ICD-10-CM

## 2020-01-02 RX ORDER — MEDROXYPROGESTERONE ACETATE 150 MG/ML
150 INJECTION, SUSPENSION INTRAMUSCULAR
Status: ACTIVE | OUTPATIENT
Start: 2020-01-03 | End: 2020-12-27

## 2020-01-02 NOTE — TELEPHONE ENCOUNTER
Called patient to discuss other contraceptive options.  She chooses depo provera.  Risks and benefits discussed.  Patient is currently on her cycle and would like to come in for the depo provera injection tomorrow between 11-12 pm.      Please schedule nurse visit at [facility] tomorrow between 11-12 pm for depo provera.  Order placed.

## 2020-01-03 ENCOUNTER — CLINICAL SUPPORT (OUTPATIENT)
Dept: OBSTETRICS AND GYNECOLOGY | Facility: CLINIC | Age: 40
End: 2020-01-03
Payer: COMMERCIAL

## 2020-01-03 DIAGNOSIS — Z30.42 ENCOUNTER FOR DEPO-PROVERA CONTRACEPTION: Primary | ICD-10-CM

## 2020-01-03 PROCEDURE — 99999 PR PBB SHADOW E&M-EST. PATIENT-LVL II: ICD-10-PCS | Mod: PBBFAC,,,

## 2020-01-03 PROCEDURE — 96372 THER/PROPH/DIAG INJ SC/IM: CPT | Mod: S$GLB,,, | Performed by: OBSTETRICS & GYNECOLOGY

## 2020-01-03 PROCEDURE — 96372 PR INJECTION,THERAP/PROPH/DIAG2ST, IM OR SUBCUT: ICD-10-PCS | Mod: S$GLB,,, | Performed by: OBSTETRICS & GYNECOLOGY

## 2020-01-03 PROCEDURE — 99999 PR PBB SHADOW E&M-EST. PATIENT-LVL II: CPT | Mod: PBBFAC,,,

## 2020-01-03 RX ORDER — AMOXICILLIN AND CLAVULANATE POTASSIUM 500; 125 MG/1; MG/1
1 TABLET, FILM COATED ORAL
COMMUNITY
Start: 2019-12-11 | End: 2020-02-28 | Stop reason: ALTCHOICE

## 2020-01-03 RX ADMIN — MEDROXYPROGESTERONE ACETATE 150 MG: 150 INJECTION, SUSPENSION INTRAMUSCULAR at 11:01

## 2020-01-03 NOTE — PROGRESS NOTES
Verified pt by two identifiers: name and date of birth.Allergies and medications reviewed.     Depo Provera 150 mg/ml Given IM to left deltoid using aseptic technique. No discomfort noted, pt tolerated well. Advised to wait 15 minutes in clinic to monitor.Next appointment scheduled 03/20/20 11:00 AM. Patient verbalized understanding.

## 2020-01-29 ENCOUNTER — OFFICE VISIT (OUTPATIENT)
Dept: DERMATOLOGY | Facility: CLINIC | Age: 40
End: 2020-01-29
Payer: COMMERCIAL

## 2020-01-29 ENCOUNTER — PATIENT MESSAGE (OUTPATIENT)
Dept: DERMATOLOGY | Facility: CLINIC | Age: 40
End: 2020-01-29

## 2020-01-29 DIAGNOSIS — L70.5 EXCORIATED ACNE: ICD-10-CM

## 2020-01-29 DIAGNOSIS — L70.0 ACNE VULGARIS: Primary | ICD-10-CM

## 2020-01-29 DIAGNOSIS — L81.9 DYSCHROMIA: ICD-10-CM

## 2020-01-29 PROCEDURE — 99202 OFFICE O/P NEW SF 15 MIN: CPT | Mod: S$GLB,,, | Performed by: PHYSICIAN ASSISTANT

## 2020-01-29 PROCEDURE — 99999 PR PBB SHADOW E&M-EST. PATIENT-LVL III: ICD-10-PCS | Mod: PBBFAC,,, | Performed by: PHYSICIAN ASSISTANT

## 2020-01-29 PROCEDURE — 99999 PR PBB SHADOW E&M-EST. PATIENT-LVL III: CPT | Mod: PBBFAC,,, | Performed by: PHYSICIAN ASSISTANT

## 2020-01-29 PROCEDURE — 99202 PR OFFICE/OUTPT VISIT, NEW, LEVL II, 15-29 MIN: ICD-10-PCS | Mod: S$GLB,,, | Performed by: PHYSICIAN ASSISTANT

## 2020-01-29 RX ORDER — CLINDAMYCIN PHOSPHATE 10 MG/G
GEL TOPICAL 2 TIMES DAILY
Qty: 60 G | Refills: 3 | Status: SHIPPED | OUTPATIENT
Start: 2020-01-29 | End: 2020-02-17 | Stop reason: CLARIF

## 2020-01-29 RX ORDER — TRETINOIN 0.25 MG/G
CREAM TOPICAL
Qty: 20 G | Refills: 6 | Status: SHIPPED | OUTPATIENT
Start: 2020-01-29 | End: 2020-06-09

## 2020-01-29 NOTE — PATIENT INSTRUCTIONS
"Recommend a gentle skin care regimen.  Look for cosmetics and skin care products with the label, "non-comedogenic," which means it will not cause acne.     Use a mild gentle cleanser once to twice daily such as Cetaphil Oil-Control Foaming Cleanser, CeraVe Foaming Cleanser, or Basis soap.      A daily sunscreen with zinc oxide, broad-spectrum coverage, and a minimum SPF of 30 is recommended to help minimize the risk of scarring and discoloration from acne lesions. Ex: Cera Ve Ultra light, La Roche Posay (tinted or non-tinted option), Neutrogena ultra sheer.    For the PM, may use an oil free moisturizer prior to Tretinoin 0.025% cream to help reduce risk of dryness. Ex. Cera Ve PM, or Cetaphil lotion for face.    It will take about 6-8 weeks to see about a 60-80% improvement in your acne.  It is very important to be consistent with your skin care regimen and to follow the treatment plan as discussed today.     "

## 2020-01-29 NOTE — PROGRESS NOTES
"  Subjective:       Patient ID:  Karen Rudolph is a 39 y.o. female who presents for   Chief Complaint   Patient presents with    Acne     Dark spot to face X several yrs - improvement OTC tx     History of Present Illness: The patient presents with chief complaint of acne. +Flares worse w/ menses. H/o Regular menses, no on Depo injections.  +Combination skin. Denies sensitive skin. Denies daily spf.  Location: face   Duration: several yrs   Signs/Symptoms: dark spots, "hormonal acne"    Prior treatments: OTC washes and creams, Ambi fade cream (last used about 2 years ago)  PMHX: + sensitive to yeast inx w/ ABX          Review of Systems   Constitutional: Negative for fever and chills.   Gastrointestinal: Negative for nausea and vomiting.   Skin: Negative for itching, rash, dry skin, daily sunscreen use, activity-related sunscreen use and recent sunburn.   Hematologic/Lymphatic: Does not bruise/bleed easily.        Objective:    Physical Exam   Constitutional: She appears well-developed and well-nourished. No distress.   Neurological: She is alert and oriented to person, place, and time. She is not disoriented.   Psychiatric: She has a normal mood and affect.   Skin:   Areas Examined (abnormalities noted in diagram):   Head / Face Inspection Performed  Neck Inspection Performed  Chest / Axilla Inspection Performed  Abdomen Inspection Performed  Back Inspection Performed  RUE Inspected  LUE Inspection Performed                   Diagram Legend     Erythematous scaling macule/papule c/w actinic keratosis       Vascular papule c/w angioma      Pigmented verrucoid papule/plaque c/w seborrheic keratosis      Yellow umbilicated papule c/w sebaceous hyperplasia      Irregularly shaped tan macule c/w lentigo     1-2 mm smooth white papules consistent with Milia      Movable subcutaneous cyst with punctum c/w epidermal inclusion cyst      Subcutaneous movable cyst c/w pilar cyst      Firm pink to brown papule c/w " dermatofibroma      Pedunculated fleshy papule(s) c/w skin tag(s)      Evenly pigmented macule c/w junctional nevus     Mildly variegated pigmented, slightly irregular-bordered macule c/w mildly atypical nevus      Flesh colored to evenly pigmented papule c/w intradermal nevus       Pink pearly papule/plaque c/w basal cell carcinoma      Erythematous hyperkeratotic cursted plaque c/w SCC      Surgical scar with no sign of skin cancer recurrence      Open and closed comedones      Inflammatory papules and pustules      Verrucoid papule consistent consistent with wart     Erythematous eczematous patches and plaques     Dystrophic onycholytic nail with subungual debris c/w onychomycosis     Umbilicated papule    Erythematous-base heme-crusted tan verrucoid plaque consistent with inflamed seborrheic keratosis     Erythematous Silvery Scaling Plaque c/w Psoriasis     See annotation      Assessment / Plan:        Acne vulgaris  Excoriated acne  Dyschromia  -     clindamycin phosphate 1% (CLINDAGEL) 1 % gel; Apply topically 2 (two) times daily.  Dispense: 60 g; Refill: 3  -     tretinoin (RETIN-A) 0.025 % cream; Apply a pea-sized amount to the entire face at bedtime.  Use every third night and increase as tolerated to nightly.  Dispense: 20 g; Refill: 6  Mild to moderate with dyschromia, start clindagel bid + tretinoin 0.025% cream qd as tolerated. Start daily spf 30 w/broad spectrum coverage. Avoid picking at acne. She declines rx for HQ today 2/2 cost. Would try to avoid oral ABX in future due to h/o yeast infections w/ABX in past.         Follow up in about 3 months (around 4/29/2020) for acne.

## 2020-02-07 ENCOUNTER — PATIENT MESSAGE (OUTPATIENT)
Dept: DERMATOLOGY | Facility: CLINIC | Age: 40
End: 2020-02-07

## 2020-02-10 DIAGNOSIS — L81.9 DYSCHROMIA: ICD-10-CM

## 2020-02-10 DIAGNOSIS — L70.0 ACNE VULGARIS: Primary | ICD-10-CM

## 2020-02-10 DIAGNOSIS — L70.5 EXCORIATED ACNE: ICD-10-CM

## 2020-02-10 RX ORDER — AZELAIC ACID 0.15 G/G
GEL TOPICAL
Qty: 50 G | Refills: 3 | Status: CANCELLED | OUTPATIENT
Start: 2020-02-10 | End: 2021-02-10

## 2020-02-10 NOTE — PROGRESS NOTES
Subjective:       Patient ID:  Karen Rudolph is a 39 y.o. female who presents for No chief complaint on file.    HPI    Review of Systems     Objective:    Physical Exam       Diagram Legend     Erythematous scaling macule/papule c/w actinic keratosis       Vascular papule c/w angioma      Pigmented verrucoid papule/plaque c/w seborrheic keratosis      Yellow umbilicated papule c/w sebaceous hyperplasia      Irregularly shaped tan macule c/w lentigo     1-2 mm smooth white papules consistent with Milia      Movable subcutaneous cyst with punctum c/w epidermal inclusion cyst      Subcutaneous movable cyst c/w pilar cyst      Firm pink to brown papule c/w dermatofibroma      Pedunculated fleshy papule(s) c/w skin tag(s)      Evenly pigmented macule c/w junctional nevus     Mildly variegated pigmented, slightly irregular-bordered macule c/w mildly atypical nevus      Flesh colored to evenly pigmented papule c/w intradermal nevus       Pink pearly papule/plaque c/w basal cell carcinoma      Erythematous hyperkeratotic cursted plaque c/w SCC      Surgical scar with no sign of skin cancer recurrence      Open and closed comedones      Inflammatory papules and pustules      Verrucoid papule consistent consistent with wart     Erythematous eczematous patches and plaques     Dystrophic onycholytic nail with subungual debris c/w onychomycosis     Umbilicated papule    Erythematous-base heme-crusted tan verrucoid plaque consistent with inflamed seborrheic keratosis     Erythematous Silvery Scaling Plaque c/w Psoriasis     See annotation      Assessment / Plan:        There are no diagnoses linked to this encounter.         No follow-ups on file.

## 2020-02-17 ENCOUNTER — PATIENT MESSAGE (OUTPATIENT)
Dept: DERMATOLOGY | Facility: CLINIC | Age: 40
End: 2020-02-17

## 2020-02-17 DIAGNOSIS — L81.9 DYSCHROMIA: ICD-10-CM

## 2020-02-17 DIAGNOSIS — L70.0 ACNE VULGARIS: Primary | ICD-10-CM

## 2020-02-17 DIAGNOSIS — L70.5 EXCORIATED ACNE: ICD-10-CM

## 2020-02-17 RX ORDER — CLINDAMYCIN PHOSPHATE 10 MG/G
GEL TOPICAL 2 TIMES DAILY
Qty: 60 G | Refills: 3 | Status: SHIPPED | OUTPATIENT
Start: 2020-02-17 | End: 2021-10-21

## 2020-02-17 NOTE — PROGRESS NOTES
Subjective:       Patient ID:  Karen Rudolph is a 39 y.o. female who presents for No chief complaint on file.    HPI    Review of Systems     Objective:    Physical Exam       Diagram Legend     Erythematous scaling macule/papule c/w actinic keratosis       Vascular papule c/w angioma      Pigmented verrucoid papule/plaque c/w seborrheic keratosis      Yellow umbilicated papule c/w sebaceous hyperplasia      Irregularly shaped tan macule c/w lentigo     1-2 mm smooth white papules consistent with Milia      Movable subcutaneous cyst with punctum c/w epidermal inclusion cyst      Subcutaneous movable cyst c/w pilar cyst      Firm pink to brown papule c/w dermatofibroma      Pedunculated fleshy papule(s) c/w skin tag(s)      Evenly pigmented macule c/w junctional nevus     Mildly variegated pigmented, slightly irregular-bordered macule c/w mildly atypical nevus      Flesh colored to evenly pigmented papule c/w intradermal nevus       Pink pearly papule/plaque c/w basal cell carcinoma      Erythematous hyperkeratotic cursted plaque c/w SCC      Surgical scar with no sign of skin cancer recurrence      Open and closed comedones      Inflammatory papules and pustules      Verrucoid papule consistent consistent with wart     Erythematous eczematous patches and plaques     Dystrophic onycholytic nail with subungual debris c/w onychomycosis     Umbilicated papule    Erythematous-base heme-crusted tan verrucoid plaque consistent with inflamed seborrheic keratosis     Erythematous Silvery Scaling Plaque c/w Psoriasis     See annotation      Assessment / Plan:        Acne vulgaris  -     clindamycin phosphate 1% (CLEOCIN T) 1 % gel; Apply topically 2 (two) times daily.  Dispense: 60 g; Refill: 3    Excoriated acne  -     clindamycin phosphate 1% (CLEOCIN T) 1 % gel; Apply topically 2 (two) times daily.  Dispense: 60 g; Refill: 3    Dyschromia  -     clindamycin phosphate 1% (CLEOCIN T) 1 % gel; Apply topically 2 (two)  times daily.  Dispense: 60 g; Refill: 3             No follow-ups on file.

## 2020-02-27 ENCOUNTER — PATIENT MESSAGE (OUTPATIENT)
Dept: OBSTETRICS AND GYNECOLOGY | Facility: CLINIC | Age: 40
End: 2020-02-27

## 2020-02-28 ENCOUNTER — OFFICE VISIT (OUTPATIENT)
Dept: INTERNAL MEDICINE | Facility: CLINIC | Age: 40
End: 2020-02-28
Payer: COMMERCIAL

## 2020-02-28 ENCOUNTER — TELEPHONE (OUTPATIENT)
Dept: INTERNAL MEDICINE | Facility: CLINIC | Age: 40
End: 2020-02-28

## 2020-02-28 VITALS
HEIGHT: 69 IN | SYSTOLIC BLOOD PRESSURE: 130 MMHG | WEIGHT: 293 LBS | DIASTOLIC BLOOD PRESSURE: 78 MMHG | HEART RATE: 119 BPM | TEMPERATURE: 98 F | OXYGEN SATURATION: 99 % | BODY MASS INDEX: 43.4 KG/M2

## 2020-02-28 DIAGNOSIS — R20.2 NUMBNESS AND TINGLING: Primary | ICD-10-CM

## 2020-02-28 DIAGNOSIS — R20.0 NUMBNESS AND TINGLING: Primary | ICD-10-CM

## 2020-02-28 DIAGNOSIS — J32.9 SINUSITIS, UNSPECIFIED CHRONICITY, UNSPECIFIED LOCATION: ICD-10-CM

## 2020-02-28 PROCEDURE — 99213 OFFICE O/P EST LOW 20 MIN: CPT | Mod: 25,S$GLB,, | Performed by: FAMILY MEDICINE

## 2020-02-28 PROCEDURE — 96372 PR INJECTION,THERAP/PROPH/DIAG2ST, IM OR SUBCUT: ICD-10-PCS | Mod: S$GLB,,, | Performed by: FAMILY MEDICINE

## 2020-02-28 PROCEDURE — 99999 PR PBB SHADOW E&M-EST. PATIENT-LVL IV: CPT | Mod: PBBFAC,,, | Performed by: FAMILY MEDICINE

## 2020-02-28 PROCEDURE — 3008F PR BODY MASS INDEX (BMI) DOCUMENTED: ICD-10-PCS | Mod: CPTII,S$GLB,, | Performed by: FAMILY MEDICINE

## 2020-02-28 PROCEDURE — 99213 PR OFFICE/OUTPT VISIT, EST, LEVL III, 20-29 MIN: ICD-10-PCS | Mod: 25,S$GLB,, | Performed by: FAMILY MEDICINE

## 2020-02-28 PROCEDURE — 3008F BODY MASS INDEX DOCD: CPT | Mod: CPTII,S$GLB,, | Performed by: FAMILY MEDICINE

## 2020-02-28 PROCEDURE — 99999 PR PBB SHADOW E&M-EST. PATIENT-LVL IV: ICD-10-PCS | Mod: PBBFAC,,, | Performed by: FAMILY MEDICINE

## 2020-02-28 PROCEDURE — 96372 THER/PROPH/DIAG INJ SC/IM: CPT | Mod: S$GLB,,, | Performed by: FAMILY MEDICINE

## 2020-02-28 RX ORDER — METHYLPREDNISOLONE ACETATE 80 MG/ML
80 INJECTION, SUSPENSION INTRA-ARTICULAR; INTRALESIONAL; INTRAMUSCULAR; SOFT TISSUE ONCE
Status: COMPLETED | OUTPATIENT
Start: 2020-02-28 | End: 2020-02-28

## 2020-02-28 RX ORDER — PREDNISONE 20 MG/1
40 TABLET ORAL DAILY
Qty: 8 TABLET | Refills: 0 | Status: SHIPPED | OUTPATIENT
Start: 2020-02-28 | End: 2020-03-03

## 2020-02-28 RX ORDER — CODEINE PHOSPHATE AND GUAIFENESIN 10; 100 MG/5ML; MG/5ML
5 SOLUTION ORAL 3 TIMES DAILY PRN
Qty: 120 ML | Refills: 0 | Status: SHIPPED | OUTPATIENT
Start: 2020-02-28 | End: 2020-03-10 | Stop reason: SDUPTHER

## 2020-02-28 RX ORDER — MELOXICAM 15 MG/1
15 TABLET ORAL DAILY
Qty: 20 TABLET | Refills: 0 | Status: SHIPPED | OUTPATIENT
Start: 2020-02-28 | End: 2020-06-09

## 2020-02-28 RX ADMIN — METHYLPREDNISOLONE ACETATE 80 MG: 80 INJECTION, SUSPENSION INTRA-ARTICULAR; INTRALESIONAL; INTRAMUSCULAR; SOFT TISSUE at 05:02

## 2020-02-28 NOTE — PROGRESS NOTES
METHYLPREDNISOLONE ACETATE 80 MG/ML INJ SUSP GIVEN LVG, PATIENT TOLERATED WELL, PATIENT ADVISED TO WAIT 15 MINUTES

## 2020-03-01 NOTE — PROGRESS NOTES
Subjective:      Patient ID: Karen Rudolph is a 39 y.o. female.    Chief Complaint: Numbness (waist area); Cough; Sore Throat; and Nasal Congestion      Patient reports for about a week now, she has had numbness in bilateral upper legs, happens when she sits or cross her legs.  She has also noticed difficulty moving her legs particularly her right leg.  Noticed when sitting and she crossed her leg it was very difficult for her to straighten the right leg out, had to use her hands to put her leg back on the ground.  She has never had symptoms like this before.  No recent injury or trauma to the area.  She also reports sinus congestion which started yesterday, coughing, scratchy throat, headache, runny nose.  Her symptoms have worsened as the day goes on today    Review of Systems   Constitutional: Negative for activity change and unexpected weight change.   HENT: Positive for postnasal drip, sinus pressure, sinus pain and trouble swallowing. Negative for hearing loss and rhinorrhea.    Eyes: Negative for discharge and visual disturbance.   Respiratory: Positive for cough. Negative for chest tightness and wheezing.    Cardiovascular: Negative for chest pain and palpitations.   Gastrointestinal: Negative for blood in stool, constipation, diarrhea and vomiting.   Endocrine: Negative for polydipsia and polyuria.   Genitourinary: Negative for difficulty urinating, dysuria, hematuria and menstrual problem.   Musculoskeletal: Negative for arthralgias, joint swelling and neck pain.   Neurological: Negative for weakness and headaches.   Psychiatric/Behavioral: Negative for confusion and dysphoric mood.     Past Medical History:   Diagnosis Date    Cholelithiasis     Chronic back pain           Past Surgical History:   Procedure Laterality Date     SECTION      COLPOSCOPY       Family History   Problem Relation Age of Onset    Diabetes Mother     Hypertension Mother      Social History     Socioeconomic  "History    Marital status: Single     Spouse name: Not on file    Number of children: Not on file    Years of education: Not on file    Highest education level: Not on file   Occupational History    Not on file   Social Needs    Financial resource strain: Not very hard    Food insecurity:     Worry: Patient refused     Inability: Never true    Transportation needs:     Medical: No     Non-medical: No   Tobacco Use    Smoking status: Current Every Day Smoker     Types: Cigarettes    Smokeless tobacco: Current User   Substance and Sexual Activity    Alcohol use: Yes     Alcohol/week: 0.0 standard drinks     Frequency: 2-4 times a month     Drinks per session: 3 or 4     Binge frequency: Less than monthly    Drug use: No    Sexual activity: Yes     Partners: Male   Lifestyle    Physical activity:     Days per week: 0 days     Minutes per session: 0 min    Stress: Very much   Relationships    Social connections:     Talks on phone: More than three times a week     Gets together: Once a week     Attends Tenriism service: Not on file     Active member of club or organization: No     Attends meetings of clubs or organizations: Patient refused     Relationship status: Never    Other Topics Concern    Not on file   Social History Narrative    Not on file     Review of patient's allergies indicates:  No Known Allergies    Objective:       /78 (BP Location: Left arm, Patient Position: Sitting, BP Method: X-Large (Manual))   Pulse (!) 119   Temp 98.2 °F (36.8 °C) (Tympanic)   Ht 5' 9" (1.753 m)   Wt 133.5 kg (294 lb 5 oz)   LMP 12/13/2019 Comment: depo injection   SpO2 99%   BMI 43.46 kg/m²   Physical Exam   Constitutional: She appears well-developed and well-nourished. No distress.   HENT:   Head: Normocephalic.   Right Ear: Hearing, tympanic membrane, external ear and ear canal normal.   Left Ear: Hearing, tympanic membrane, external ear and ear canal normal.   Nose: Mucosal edema " present. Right sinus exhibits no maxillary sinus tenderness and no frontal sinus tenderness. Left sinus exhibits no maxillary sinus tenderness and no frontal sinus tenderness.   Mouth/Throat: Uvula is midline and mucous membranes are normal. Posterior oropharyngeal erythema present.   Eyes: Pupils are equal, round, and reactive to light. Conjunctivae and EOM are normal.   Cardiovascular: Normal rate, regular rhythm and normal heart sounds.   Pulmonary/Chest: Effort normal and breath sounds normal. No respiratory distress.   Abdominal: Soft. Bowel sounds are normal.   Musculoskeletal: Normal range of motion. She exhibits no edema, tenderness or deformity.   Lymphadenopathy:     She has no cervical adenopathy.   Neurological: She displays normal reflexes. No sensory deficit. She exhibits normal muscle tone.   Skin: Skin is warm and dry. She is not diaphoretic.   Psychiatric: She has a normal mood and affect. Her behavior is normal.   Nursing note and vitals reviewed.    Assessment:     1. Numbness and tingling    2. Sinusitis, unspecified chronicity, unspecified location      Plan:   Numbness and tingling  -     X-Ray Pelvis Complete min 3 views; Future; Expected date: 02/28/2020  -     EMG W/ ULTRASOUND AND NERVE CONDUCTION TEST 2 Extremities; Future    Sinusitis, unspecified chronicity, unspecified location    Other orders  -     meloxicam (MOBIC) 15 MG tablet; Take 1 tablet (15 mg total) by mouth once daily. Take with meal.  Dispense: 20 tablet; Refill: 0  -     predniSONE (DELTASONE) 20 MG tablet; Take 2 tablets (40 mg total) by mouth once daily. for 4 days  Dispense: 8 tablet; Refill: 0  -     methylPREDNISolone acetate injection 80 mg  -     guaifenesin-codeine 100-10 mg/5 ml (TUSSI-ORGANIDIN NR)  mg/5 mL syrup; Take 5 mLs by mouth 3 (three) times daily as needed for Cough.  Dispense: 120 mL; Refill: 0      Medication List with Changes/Refills   New Medications    GUAIFENESIN-CODEINE 100-10 MG/5 ML  (TUSSI-ORGANIDIN NR)  MG/5 ML SYRUP    Take 5 mLs by mouth 3 (three) times daily as needed for Cough.    MELOXICAM (MOBIC) 15 MG TABLET    Take 1 tablet (15 mg total) by mouth once daily. Take with meal.    PREDNISONE (DELTASONE) 20 MG TABLET    Take 2 tablets (40 mg total) by mouth once daily. for 4 days   Current Medications    BUPROPION (WELLBUTRIN SR) 100 MG TBSR 12 HR TABLET    Take 1 tablet (100 mg total) by mouth 2 (two) times daily.    CLINDAMYCIN PHOSPHATE 1% (CLEOCIN T) 1 % GEL    Apply topically 2 (two) times daily.    GABAPENTIN (NEURONTIN) 100 MG CAPSULE    Take 100 mg by mouth as needed.     TRAZODONE (DESYREL) 50 MG TABLET    Take 1 tablet (50 mg total) by mouth nightly as needed for Insomnia or Depression.    TRETINOIN (RETIN-A) 0.025 % CREAM    Apply a pea-sized amount to the entire face at bedtime.  Use every third night and increase as tolerated to nightly.   Discontinued Medications    AMOXICILLIN-CLAVULANATE 500-125MG (AUGMENTIN) 500-125 MG TAB    Take 1 tablet by mouth as needed.

## 2020-03-02 ENCOUNTER — PATIENT MESSAGE (OUTPATIENT)
Dept: INTERNAL MEDICINE | Facility: CLINIC | Age: 40
End: 2020-03-02

## 2020-03-02 ENCOUNTER — TELEPHONE (OUTPATIENT)
Dept: PHYSICAL MEDICINE AND REHAB | Facility: CLINIC | Age: 40
End: 2020-03-02

## 2020-03-02 DIAGNOSIS — R20.2 NUMBNESS AND TINGLING: Primary | ICD-10-CM

## 2020-03-02 DIAGNOSIS — R20.0 NUMBNESS AND TINGLING: Primary | ICD-10-CM

## 2020-03-03 ENCOUNTER — TELEPHONE (OUTPATIENT)
Dept: DERMATOLOGY | Facility: CLINIC | Age: 40
End: 2020-03-03

## 2020-03-03 ENCOUNTER — PATIENT MESSAGE (OUTPATIENT)
Dept: PHYSICAL MEDICINE AND REHAB | Facility: CLINIC | Age: 40
End: 2020-03-03

## 2020-03-05 ENCOUNTER — PATIENT MESSAGE (OUTPATIENT)
Dept: OBSTETRICS AND GYNECOLOGY | Facility: CLINIC | Age: 40
End: 2020-03-05

## 2020-03-07 ENCOUNTER — HOSPITAL ENCOUNTER (OUTPATIENT)
Dept: RADIOLOGY | Facility: HOSPITAL | Age: 40
Discharge: HOME | End: 2020-03-07
Attending: FAMILY MEDICINE
Payer: COMMERCIAL

## 2020-03-07 DIAGNOSIS — R20.0 NUMBNESS AND TINGLING: ICD-10-CM

## 2020-03-07 DIAGNOSIS — R20.2 NUMBNESS AND TINGLING: ICD-10-CM

## 2020-03-07 PROCEDURE — 72170 X-RAY EXAM OF PELVIS: CPT | Mod: 26,,, | Performed by: RADIOLOGY

## 2020-03-07 PROCEDURE — 72170 XR PELVIS ROUTINE AP: ICD-10-PCS | Mod: 26,,, | Performed by: RADIOLOGY

## 2020-03-07 PROCEDURE — 72170 X-RAY EXAM OF PELVIS: CPT | Mod: TC

## 2020-03-10 ENCOUNTER — PATIENT MESSAGE (OUTPATIENT)
Dept: INTERNAL MEDICINE | Facility: CLINIC | Age: 40
End: 2020-03-10

## 2020-03-10 RX ORDER — CODEINE PHOSPHATE AND GUAIFENESIN 10; 100 MG/5ML; MG/5ML
5 SOLUTION ORAL 3 TIMES DAILY PRN
Qty: 120 ML | Refills: 0 | Status: SHIPPED | OUTPATIENT
Start: 2020-03-10 | End: 2020-03-20

## 2020-03-18 ENCOUNTER — PATIENT MESSAGE (OUTPATIENT)
Dept: OBSTETRICS AND GYNECOLOGY | Facility: CLINIC | Age: 40
End: 2020-03-18

## 2020-03-18 DIAGNOSIS — Z30.013 ENCOUNTER FOR INITIAL PRESCRIPTION OF INJECTABLE CONTRACEPTIVE: Primary | ICD-10-CM

## 2020-03-19 ENCOUNTER — PATIENT MESSAGE (OUTPATIENT)
Dept: OBSTETRICS AND GYNECOLOGY | Facility: CLINIC | Age: 40
End: 2020-03-19

## 2020-03-19 RX ORDER — MEDROXYPROGESTERONE ACETATE 150 MG/ML
150 INJECTION, SUSPENSION INTRAMUSCULAR
Qty: 1 ML | Refills: 2 | Status: SHIPPED | OUTPATIENT
Start: 2020-03-19 | End: 2020-09-25

## 2020-03-19 NOTE — TELEPHONE ENCOUNTER
See patient portal message.  I'm fine with patient getting her depo provera injection from the pharmacy and having someone give it to her.  However, she will need to check with the pharmacy regarding insurance coverage.  Rx sent in.

## 2020-04-02 ENCOUNTER — PATIENT MESSAGE (OUTPATIENT)
Dept: INTERNAL MEDICINE | Facility: CLINIC | Age: 40
End: 2020-04-02

## 2020-04-15 ENCOUNTER — PATIENT MESSAGE (OUTPATIENT)
Dept: INTERNAL MEDICINE | Facility: CLINIC | Age: 40
End: 2020-04-15

## 2020-04-16 ENCOUNTER — PATIENT MESSAGE (OUTPATIENT)
Dept: OBSTETRICS AND GYNECOLOGY | Facility: CLINIC | Age: 40
End: 2020-04-16

## 2020-04-16 ENCOUNTER — PATIENT MESSAGE (OUTPATIENT)
Dept: INTERNAL MEDICINE | Facility: CLINIC | Age: 40
End: 2020-04-16

## 2020-04-16 ENCOUNTER — OFFICE VISIT (OUTPATIENT)
Dept: INTERNAL MEDICINE | Facility: CLINIC | Age: 40
End: 2020-04-16
Payer: COMMERCIAL

## 2020-04-16 DIAGNOSIS — M54.30 SCIATICA, UNSPECIFIED LATERALITY: ICD-10-CM

## 2020-04-16 DIAGNOSIS — F32.A ANXIETY AND DEPRESSION: Primary | ICD-10-CM

## 2020-04-16 DIAGNOSIS — N93.9 ABNORMAL UTERINE BLEEDING: Primary | ICD-10-CM

## 2020-04-16 DIAGNOSIS — F41.9 ANXIETY AND DEPRESSION: Primary | ICD-10-CM

## 2020-04-16 PROCEDURE — 99213 PR OFFICE/OUTPT VISIT, EST, LEVL III, 20-29 MIN: ICD-10-PCS | Mod: 95,,, | Performed by: FAMILY MEDICINE

## 2020-04-16 PROCEDURE — 99213 OFFICE O/P EST LOW 20 MIN: CPT | Mod: 95,,, | Performed by: FAMILY MEDICINE

## 2020-04-16 RX ORDER — ESCITALOPRAM OXALATE 10 MG/1
10 TABLET ORAL DAILY
Qty: 30 TABLET | Refills: 5 | Status: SHIPPED | OUTPATIENT
Start: 2020-04-16 | End: 2020-09-29 | Stop reason: SDUPTHER

## 2020-04-16 RX ORDER — ALPRAZOLAM 0.5 MG/1
0.5 TABLET ORAL 2 TIMES DAILY PRN
Qty: 60 TABLET | Refills: 2 | Status: SHIPPED | OUTPATIENT
Start: 2020-04-16 | End: 2020-09-29 | Stop reason: SDUPTHER

## 2020-04-16 RX ORDER — GABAPENTIN 100 MG/1
100 CAPSULE ORAL 3 TIMES DAILY PRN
Qty: 90 CAPSULE | Refills: 5 | Status: SHIPPED | OUTPATIENT
Start: 2020-04-16 | End: 2020-05-21 | Stop reason: SDUPTHER

## 2020-04-16 NOTE — PROGRESS NOTES
The patient location is: her home  The chief complaint leading to consultation is: anxiety  Visit type: audiovisual  Total time spent with patient: 12 minutes  Each patient to whom he or she provides medical services by telemedicine is:  (1) informed of the relationship between the physician and patient and the respective role of any other health care provider with respect to management of the patient; and (2) notified that he or she may decline to receive medical services by telemedicine and may withdraw from such care at any time.    Notes:     Subjective:      Patient ID: Karen Rudolph is a 39 y.o. female.    Chief Complaint: Anxiety      Patient reports increased anxiety and stress in the past few weeks, she is working from home and has not left her home in about a month now.  She reports episodes of breaking out in a cold sweat, palpitations, increased anxiety, binge eating and crying.  She has no prior history of depression or anxiety diagnoses.  She also reports chronic sciatica has worsened, she is out of her gabapentin.    Review of Systems   Psychiatric/Behavioral: Positive for decreased concentration, dysphoric mood and sleep disturbance. Negative for confusion, hallucinations and suicidal ideas. The patient is nervous/anxious.      Past Medical History:   Diagnosis Date    Cholelithiasis     Chronic back pain           Past Surgical History:   Procedure Laterality Date     SECTION      COLPOSCOPY       Family History   Problem Relation Age of Onset    Diabetes Mother     Hypertension Mother      Social History     Socioeconomic History    Marital status: Single     Spouse name: Not on file    Number of children: Not on file    Years of education: Not on file    Highest education level: Not on file   Occupational History    Not on file   Social Needs    Financial resource strain: Not very hard    Food insecurity:     Worry: Patient refused     Inability: Never true     Transportation needs:     Medical: No     Non-medical: No   Tobacco Use    Smoking status: Current Every Day Smoker     Types: Cigarettes    Smokeless tobacco: Current User   Substance and Sexual Activity    Alcohol use: Yes     Alcohol/week: 0.0 standard drinks     Frequency: 2-4 times a month     Drinks per session: 3 or 4     Binge frequency: Less than monthly    Drug use: No    Sexual activity: Yes     Partners: Male   Lifestyle    Physical activity:     Days per week: 0 days     Minutes per session: 0 min    Stress: Very much   Relationships    Social connections:     Talks on phone: More than three times a week     Gets together: Once a week     Attends Congregational service: Not on file     Active member of club or organization: No     Attends meetings of clubs or organizations: Patient refused     Relationship status: Never    Other Topics Concern    Not on file   Social History Narrative    Not on file     Review of patient's allergies indicates:  No Known Allergies    Objective:       There were no vitals taken for this visit.  Physical Exam   Constitutional: She is oriented to person, place, and time. She appears well-developed and well-nourished. No distress.   Neurological: She is alert and oriented to person, place, and time.   Skin: She is not diaphoretic.   Psychiatric: She has a normal mood and affect. Her behavior is normal. Judgment and thought content normal.     Assessment:     1. Anxiety and depression    2. Sciatica, unspecified laterality      Plan:   Anxiety and depression    Sciatica, unspecified laterality    Other orders  -     gabapentin (NEURONTIN) 100 MG capsule; Take 1 capsule (100 mg total) by mouth 3 (three) times daily as needed.  Dispense: 90 capsule; Refill: 5  -     ALPRAZolam (XANAX) 0.5 MG tablet; Take 1 tablet (0.5 mg total) by mouth 2 (two) times daily as needed for Anxiety.  Dispense: 60 tablet; Refill: 2  -     escitalopram oxalate (LEXAPRO) 10 MG tablet; Take  1 tablet (10 mg total) by mouth once daily.  Dispense: 30 tablet; Refill: 5      Medication List with Changes/Refills   New Medications    ALPRAZOLAM (XANAX) 0.5 MG TABLET    Take 1 tablet (0.5 mg total) by mouth 2 (two) times daily as needed for Anxiety.    ESCITALOPRAM OXALATE (LEXAPRO) 10 MG TABLET    Take 1 tablet (10 mg total) by mouth once daily.   Current Medications    CLINDAMYCIN PHOSPHATE 1% (CLEOCIN T) 1 % GEL    Apply topically 2 (two) times daily.    MEDROXYPROGESTERONE (DEPO-PROVERA) 150 MG/ML SYRG    Inject 1 mL (150 mg total) into the muscle every 3 (three) months.    MELOXICAM (MOBIC) 15 MG TABLET    Take 1 tablet (15 mg total) by mouth once daily. Take with meal.    TRAZODONE (DESYREL) 50 MG TABLET    Take 1 tablet (50 mg total) by mouth nightly as needed for Insomnia or Depression.    TRETINOIN (RETIN-A) 0.025 % CREAM    Apply a pea-sized amount to the entire face at bedtime.  Use every third night and increase as tolerated to nightly.   Changed and/or Refilled Medications    Modified Medication Previous Medication    GABAPENTIN (NEURONTIN) 100 MG CAPSULE gabapentin (NEURONTIN) 100 MG capsule       Take 1 capsule (100 mg total) by mouth 3 (three) times daily as needed.    Take 100 mg by mouth as needed.    Discontinued Medications    BUPROPION (WELLBUTRIN SR) 100 MG TBSR 12 HR TABLET    Take 1 tablet (100 mg total) by mouth 2 (two) times daily.

## 2020-04-21 DIAGNOSIS — Z01.84 ANTIBODY RESPONSE EXAMINATION: ICD-10-CM

## 2020-05-04 ENCOUNTER — PATIENT MESSAGE (OUTPATIENT)
Dept: PHYSICAL MEDICINE AND REHAB | Facility: CLINIC | Age: 40
End: 2020-05-04

## 2020-05-21 DIAGNOSIS — Z01.84 ANTIBODY RESPONSE EXAMINATION: ICD-10-CM

## 2020-05-21 RX ORDER — GABAPENTIN 100 MG/1
100 CAPSULE ORAL 3 TIMES DAILY PRN
Qty: 270 CAPSULE | Refills: 3 | Status: SHIPPED | OUTPATIENT
Start: 2020-05-21

## 2020-05-24 ENCOUNTER — PATIENT MESSAGE (OUTPATIENT)
Dept: PHYSICAL MEDICINE AND REHAB | Facility: CLINIC | Age: 40
End: 2020-05-24

## 2020-06-09 ENCOUNTER — OFFICE VISIT (OUTPATIENT)
Dept: OBSTETRICS AND GYNECOLOGY | Facility: CLINIC | Age: 40
End: 2020-06-09
Payer: COMMERCIAL

## 2020-06-09 ENCOUNTER — LAB VISIT (OUTPATIENT)
Dept: LAB | Facility: HOSPITAL | Age: 40
End: 2020-06-09
Attending: NURSE PRACTITIONER
Payer: COMMERCIAL

## 2020-06-09 VITALS — SYSTOLIC BLOOD PRESSURE: 124 MMHG | WEIGHT: 293 LBS | DIASTOLIC BLOOD PRESSURE: 84 MMHG | BODY MASS INDEX: 43.3 KG/M2

## 2020-06-09 DIAGNOSIS — Z11.3 SCREEN FOR STD (SEXUALLY TRANSMITTED DISEASE): Primary | ICD-10-CM

## 2020-06-09 DIAGNOSIS — Z11.3 SCREEN FOR STD (SEXUALLY TRANSMITTED DISEASE): ICD-10-CM

## 2020-06-09 DIAGNOSIS — N76.5 VAGINAL ULCERATION: ICD-10-CM

## 2020-06-09 DIAGNOSIS — B37.9 CANDIDIASIS: ICD-10-CM

## 2020-06-09 DIAGNOSIS — N89.8 VAGINAL IRRITATION: ICD-10-CM

## 2020-06-09 PROCEDURE — 86592 SYPHILIS TEST NON-TREP QUAL: CPT

## 2020-06-09 PROCEDURE — 3008F BODY MASS INDEX DOCD: CPT | Mod: CPTII,S$GLB,, | Performed by: NURSE PRACTITIONER

## 2020-06-09 PROCEDURE — 87529 HSV DNA AMP PROBE: CPT

## 2020-06-09 PROCEDURE — 87491 CHLMYD TRACH DNA AMP PROBE: CPT

## 2020-06-09 PROCEDURE — 99213 PR OFFICE/OUTPT VISIT, EST, LEVL III, 20-29 MIN: ICD-10-PCS | Mod: S$GLB,,, | Performed by: NURSE PRACTITIONER

## 2020-06-09 PROCEDURE — 87481 CANDIDA DNA AMP PROBE: CPT | Mod: 59

## 2020-06-09 PROCEDURE — 99213 OFFICE O/P EST LOW 20 MIN: CPT | Mod: S$GLB,,, | Performed by: NURSE PRACTITIONER

## 2020-06-09 PROCEDURE — 99999 PR PBB SHADOW E&M-EST. PATIENT-LVL II: CPT | Mod: PBBFAC,,, | Performed by: NURSE PRACTITIONER

## 2020-06-09 PROCEDURE — 86703 HIV-1/HIV-2 1 RESULT ANTBDY: CPT

## 2020-06-09 PROCEDURE — 3008F PR BODY MASS INDEX (BMI) DOCUMENTED: ICD-10-PCS | Mod: CPTII,S$GLB,, | Performed by: NURSE PRACTITIONER

## 2020-06-09 PROCEDURE — 99999 PR PBB SHADOW E&M-EST. PATIENT-LVL II: ICD-10-PCS | Mod: PBBFAC,,, | Performed by: NURSE PRACTITIONER

## 2020-06-09 PROCEDURE — 36415 COLL VENOUS BLD VENIPUNCTURE: CPT

## 2020-06-09 NOTE — PROGRESS NOTES
CC: Vaginal discharge      /84   Wt 133 kg (293 lb 3.4 oz)   BMI 43.30 kg/m²       CC: Vaginal irritation     Karen Rudolph is a 39 y.o. female  presents with complaint of vaginal irritation times 3 days. States last time she experienced this sx she was diagnosed with trichomonas. Denies vaginal itching and odor.      Past Medical History:   Diagnosis Date    Cholelithiasis     Chronic back pain      Past Surgical History:   Procedure Laterality Date     SECTION      COLPOSCOPY       Social History     Tobacco Use    Smoking status: Current Every Day Smoker     Types: Cigarettes    Smokeless tobacco: Current User   Substance Use Topics    Alcohol use: Yes     Alcohol/week: 0.0 standard drinks     Frequency: 2-4 times a month     Drinks per session: 3 or 4     Binge frequency: Less than monthly    Drug use: No     Family History   Problem Relation Age of Onset    Diabetes Mother     Hypertension Mother      OB History    Para Term  AB Living   1 1 1     1   SAB TAB Ectopic Multiple Live Births           1      # Outcome Date GA Lbr Jose Enrique/2nd Weight Sex Delivery Anes PTL Lv   1 Term      CS-LTranv          /84   Wt 133 kg (293 lb 3.4 oz)   BMI 43.30 kg/m²     ROS:  GENERAL: No fever, chills, fatigability or weight loss.  VULVAR: No pain, no lesions and no itching.  VAGINAL: No relaxation, no itching, no discharge, no abnormal bleeding and no lesions.  ABDOMEN: No abdominal pain. Denies nausea. Denies vomiting. No diarrhea. No constipation  BREAST: Denies pain. No lumps. No discharge.  URINARY: No incontinence, no nocturia, no frequency and no dysuria.  CARDIOVASCULAR: No chest pain. No shortness of breath. No leg cramps.  NEUROLOGICAL: No headaches. No vision changes.    PHYSICAL EXAM:  Physical Exam   Constitutional: She is oriented to person, place, and time. She appears well-developed and well-nourished.   Genitourinary: Vagina normal and uterus normal. Pelvic  exam was performed with patient supine.         Genitourinary Comments: 3 single shallow ulcers    HENT:   Head: Normocephalic and atraumatic.   Eyes: EOM are normal.   Neck: Normal range of motion.   Pulmonary/Chest: Effort normal.   Musculoskeletal: Normal range of motion.   Neurological: She is alert and oriented to person, place, and time.   Skin: Skin is warm and dry.   Psychiatric: She has a normal mood and affect.       ASSESSMENT and PLAN:    ICD-10-CM ICD-9-CM    1. Screen for STD (sexually transmitted disease) Z11.3 V74.5    2. Vaginal irritation N89.8 623.9    3. Vaginal ulceration N76.5 616.89          Patient was counseled today on vaginitis prevention including :  a. avoiding feminine products such as deoderant soaps, body wash, bubble bath, douches, scented toilet paper, deoderant tampons or pads, feminine wipes, chronic pad use, etc.  b. avoiding other vulvovaginal irritants such as long hot baths, humidity, tight, synthetic clothing, chlorine and sitting around in wet bathing suits  c. wearing cotton underwear, avoiding thong underwear and no underwear to bed  d. taking showers instead of baths and use a hair dryer on cool setting afterwards to dry  e. wearing cotton to exercise and shower immediately after exercise and change clothes  f. using polyurethane condoms without spermicide if sexually active and symptoms are triggered by intercourse

## 2020-06-09 NOTE — LETTER
June 9, 2020      [facility]  [address] [address]  Phone: [phone]  Fax: [phone]       Patient: Karen Rudolph   YOB: 1980  Date of Visit: 06/09/2020    To Whom It May Concern:    Valentín Rudolph  was at [facility] on 06/09/2020. She may return to work/school on 6/10/2020 with no  restrictions. If you have any questions or concerns, or if I can be of further assistance, please do not hesitate to contact me.    Sincerely,    Jazmyne Spencer NP

## 2020-06-10 LAB
BACTERIAL VAGINOSIS DNA: NEGATIVE
C TRACH DNA SPEC QL NAA+PROBE: NOT DETECTED
CANDIDA GLABRATA DNA: NEGATIVE
CANDIDA KRUSEI DNA: NEGATIVE
CANDIDA RRNA VAG QL PROBE: POSITIVE
HIV 1+2 AB+HIV1 P24 AG SERPL QL IA: NEGATIVE
N GONORRHOEA DNA SPEC QL NAA+PROBE: NOT DETECTED
RPR SER QL: NORMAL
T VAGINALIS RRNA GENITAL QL PROBE: NEGATIVE

## 2020-06-11 RX ORDER — FLUCONAZOLE 150 MG/1
150 TABLET ORAL ONCE
Qty: 1 TABLET | Refills: 0 | Status: SHIPPED | OUTPATIENT
Start: 2020-06-11 | End: 2020-06-12

## 2020-06-12 LAB
HSV1 DNA SPEC QL NAA+PROBE: NEGATIVE
HSV2 DNA SPEC QL NAA+PROBE: NEGATIVE
SPECIMEN SOURCE: NORMAL

## 2020-06-20 DIAGNOSIS — Z01.84 ANTIBODY RESPONSE EXAMINATION: ICD-10-CM

## 2020-07-20 DIAGNOSIS — Z01.84 ANTIBODY RESPONSE EXAMINATION: ICD-10-CM

## 2020-08-19 DIAGNOSIS — Z01.84 ANTIBODY RESPONSE EXAMINATION: ICD-10-CM

## 2020-09-17 ENCOUNTER — OFFICE VISIT (OUTPATIENT)
Dept: OBSTETRICS AND GYNECOLOGY | Facility: CLINIC | Age: 40
End: 2020-09-17
Payer: COMMERCIAL

## 2020-09-17 ENCOUNTER — PATIENT MESSAGE (OUTPATIENT)
Dept: OBSTETRICS AND GYNECOLOGY | Facility: CLINIC | Age: 40
End: 2020-09-17

## 2020-09-17 VITALS
HEIGHT: 69 IN | DIASTOLIC BLOOD PRESSURE: 80 MMHG | SYSTOLIC BLOOD PRESSURE: 124 MMHG | WEIGHT: 293 LBS | BODY MASS INDEX: 43.4 KG/M2

## 2020-09-17 DIAGNOSIS — N89.8 VAGINAL LESION: ICD-10-CM

## 2020-09-17 DIAGNOSIS — N92.6 IRREGULAR MENSES: Primary | ICD-10-CM

## 2020-09-17 PROCEDURE — 3008F BODY MASS INDEX DOCD: CPT | Mod: CPTII,S$GLB,, | Performed by: NURSE PRACTITIONER

## 2020-09-17 PROCEDURE — 99214 PR OFFICE/OUTPT VISIT, EST, LEVL IV, 30-39 MIN: ICD-10-PCS | Mod: S$GLB,,, | Performed by: NURSE PRACTITIONER

## 2020-09-17 PROCEDURE — 87529 HSV DNA AMP PROBE: CPT

## 2020-09-17 PROCEDURE — 99214 OFFICE O/P EST MOD 30 MIN: CPT | Mod: S$GLB,,, | Performed by: NURSE PRACTITIONER

## 2020-09-17 PROCEDURE — 99999 PR PBB SHADOW E&M-EST. PATIENT-LVL III: ICD-10-PCS | Mod: PBBFAC,,, | Performed by: NURSE PRACTITIONER

## 2020-09-17 PROCEDURE — 3008F PR BODY MASS INDEX (BMI) DOCUMENTED: ICD-10-PCS | Mod: CPTII,S$GLB,, | Performed by: NURSE PRACTITIONER

## 2020-09-17 PROCEDURE — 99999 PR PBB SHADOW E&M-EST. PATIENT-LVL III: CPT | Mod: PBBFAC,,, | Performed by: NURSE PRACTITIONER

## 2020-09-17 RX ORDER — MELOXICAM 15 MG/1
TABLET ORAL
COMMUNITY
Start: 2020-09-04 | End: 2021-01-23

## 2020-09-17 RX ORDER — TIZANIDINE 4 MG/1
TABLET ORAL
COMMUNITY
Start: 2020-09-04 | End: 2020-09-29

## 2020-09-17 NOTE — PROGRESS NOTES
"CC: " Bleeding after Depo-provera'     Karen Rudolph is a 40 y.o. female  presents for bleeding after depo-provera injection. Patient was seen by Dr. Lema for pelvic pain and irregular bleeding. Ultrasound ( 2019) indicated Simple left ovarian cyst measuring up to 2.9 cm. Patient was to follow-up with additional image, did not. Patient was"  rx OCP late last yr and wanted to change to depo-provera". Last injection was 2020. No pelvic pain. Last pap exam was normal, . Is sexually active, "wants another child". BMI 44.90, " never checked for PCOS".     Past Medical History:   Diagnosis Date    Cholelithiasis     Chronic back pain      Past Surgical History:   Procedure Laterality Date     SECTION      COLPOSCOPY       Social History     Socioeconomic History    Marital status: Single     Spouse name: Not on file    Number of children: Not on file    Years of education: Not on file    Highest education level: Not on file   Occupational History    Not on file   Social Needs    Financial resource strain: Somewhat hard    Food insecurity     Worry: Sometimes true     Inability: Patient refused    Transportation needs     Medical: No     Non-medical: No   Tobacco Use    Smoking status: Current Every Day Smoker     Types: Cigarettes    Smokeless tobacco: Current User   Substance and Sexual Activity    Alcohol use: Yes     Alcohol/week: 0.0 standard drinks     Frequency: 2-3 times a week     Drinks per session: 3 or 4     Binge frequency: Less than monthly    Drug use: No    Sexual activity: Yes     Partners: Male   Lifestyle    Physical activity     Days per week: 0 days     Minutes per session: 0 min    Stress: Very much   Relationships    Social connections     Talks on phone: More than three times a week     Gets together: Twice a week     Attends Hinduism service: Not on file     Active member of club or organization: No     Attends meetings of clubs or organizations: " "Patient refused     Relationship status: Never    Other Topics Concern    Not on file   Social History Narrative    Not on file     Family History   Problem Relation Age of Onset    Diabetes Mother     Hypertension Mother      OB History        1    Para   1    Term   1            AB        Living   1       SAB        TAB        Ectopic        Multiple        Live Births   1                 /80   Ht 5' 9" (1.753 m)   Wt (!) 137.9 kg (304 lb 0.2 oz)   LMP 2020   BMI 44.90 kg/m²       ROS:  GENERAL: Denies weight gain or weight loss. Feeling well overall.   CHEST: Denies chest pain or shortness of breath.   CARDIOVASCULAR: Denies palpitations or left sided chest pain.   ABDOMEN: No abdominal pain, constipation, diarrhea, nausea, vomiting or rectal bleeding.   URINARY: No frequency, dysuria, hematuria, or burning on urination.  REPRODUCTIVE: See HPI.     PHYSICAL EXAM:  APPEARANCE: Well nourished, well developed, in no acute distress.  AFFECT: WNL, alert and oriented x 3  ABDOMEN: Soft.  No tenderness or masses.  No hepatosplenomegaly.  No hernias.  PELVIC: External genitalia small blister left of clitoral alan.     1. Irregular menses  Hemoglobin A1C    Insulin, random    CBC auto differential    Comprehensive metabolic panel    hCG, quantitative    Cortisol, 8AM    Prolactin    17-Hydroxyprogesterone    Luteinizing hormone    Follicle stimulating hormone    Testosterone    TSH    US Pelvis Complete Non OB    HSV by Rapid PCR, Non-Blood [facility]; Vagina    PLAN:  HSV cx  PCOS labs  Repeat pelvic ultrasound                   "

## 2020-09-18 ENCOUNTER — HOSPITAL ENCOUNTER (OUTPATIENT)
Dept: RADIOLOGY | Facility: HOSPITAL | Age: 40
Discharge: HOME | End: 2020-09-18
Attending: NURSE PRACTITIONER
Payer: COMMERCIAL

## 2020-09-18 DIAGNOSIS — N92.6 IRREGULAR MENSES: ICD-10-CM

## 2020-09-18 DIAGNOSIS — Z01.84 ANTIBODY RESPONSE EXAMINATION: ICD-10-CM

## 2020-09-18 PROCEDURE — 76856 US EXAM PELVIC COMPLETE: CPT | Mod: TC

## 2020-09-19 ENCOUNTER — PATIENT MESSAGE (OUTPATIENT)
Dept: OBSTETRICS AND GYNECOLOGY | Facility: CLINIC | Age: 40
End: 2020-09-19

## 2020-09-19 ENCOUNTER — LAB VISIT (OUTPATIENT)
Dept: LAB | Facility: HOSPITAL | Age: 40
End: 2020-09-19
Attending: NURSE PRACTITIONER
Payer: COMMERCIAL

## 2020-09-19 DIAGNOSIS — N92.6 IRREGULAR MENSES: ICD-10-CM

## 2020-09-19 LAB
ALBUMIN SERPL BCP-MCNC: 3.7 G/DL (ref 3.5–5.2)
ALP SERPL-CCNC: 69 U/L (ref 55–135)
ALT SERPL W/O P-5'-P-CCNC: 18 U/L (ref 10–44)
ANION GAP SERPL CALC-SCNC: 8 MMOL/L (ref 8–16)
AST SERPL-CCNC: 18 U/L (ref 10–40)
BASOPHILS # BLD AUTO: 0.05 K/UL (ref 0–0.2)
BASOPHILS NFR BLD: 0.7 % (ref 0–1.9)
BILIRUB SERPL-MCNC: 0.4 MG/DL (ref 0.1–1)
BUN SERPL-MCNC: 7 MG/DL (ref 6–20)
CALCIUM SERPL-MCNC: 8.9 MG/DL (ref 8.7–10.5)
CHLORIDE SERPL-SCNC: 108 MMOL/L (ref 95–110)
CO2 SERPL-SCNC: 23 MMOL/L (ref 23–29)
CORTIS SERPL-MCNC: 10.7 UG/DL (ref 4.3–22.4)
CREAT SERPL-MCNC: 0.8 MG/DL (ref 0.5–1.4)
DIFFERENTIAL METHOD: ABNORMAL
EOSINOPHIL # BLD AUTO: 0.1 K/UL (ref 0–0.5)
EOSINOPHIL NFR BLD: 1.8 % (ref 0–8)
ERYTHROCYTE [DISTWIDTH] IN BLOOD BY AUTOMATED COUNT: 13.2 % (ref 11.5–14.5)
EST. GFR  (AFRICAN AMERICAN): >60 ML/MIN/1.73 M^2
EST. GFR  (NON AFRICAN AMERICAN): >60 ML/MIN/1.73 M^2
ESTIMATED AVG GLUCOSE: 97 MG/DL (ref 68–131)
FSH SERPL-ACNC: 5 MIU/ML
GLUCOSE SERPL-MCNC: 121 MG/DL (ref 70–110)
HBA1C MFR BLD HPLC: 5 % (ref 4–5.6)
HCG INTACT+B SERPL-ACNC: <1.2 MIU/ML
HCT VFR BLD AUTO: 42 % (ref 37–48.5)
HGB BLD-MCNC: 13.3 G/DL (ref 12–16)
IMM GRANULOCYTES # BLD AUTO: 0.01 K/UL (ref 0–0.04)
IMM GRANULOCYTES NFR BLD AUTO: 0.1 % (ref 0–0.5)
INSULIN COLLECTION INTERVAL: ABNORMAL
INSULIN SERPL-ACNC: 98.6 UU/ML
LH SERPL-ACNC: 4.4 MIU/ML
LYMPHOCYTES # BLD AUTO: 2.5 K/UL (ref 1–4.8)
LYMPHOCYTES NFR BLD: 37 % (ref 18–48)
MCH RBC QN AUTO: 30.1 PG (ref 27–31)
MCHC RBC AUTO-ENTMCNC: 31.7 G/DL (ref 32–36)
MCV RBC AUTO: 95 FL (ref 82–98)
MONOCYTES # BLD AUTO: 0.3 K/UL (ref 0.3–1)
MONOCYTES NFR BLD: 5 % (ref 4–15)
NEUTROPHILS # BLD AUTO: 3.8 K/UL (ref 1.8–7.7)
NEUTROPHILS NFR BLD: 55.5 % (ref 38–73)
NRBC BLD-RTO: 0 /100 WBC
PLATELET # BLD AUTO: 317 K/UL (ref 150–350)
PMV BLD AUTO: 8.5 FL (ref 9.2–12.9)
POTASSIUM SERPL-SCNC: 3.9 MMOL/L (ref 3.5–5.1)
PROLACTIN SERPL IA-MCNC: 8.5 NG/ML (ref 5.2–26.5)
PROT SERPL-MCNC: 7.3 G/DL (ref 6–8.4)
RBC # BLD AUTO: 4.42 M/UL (ref 4–5.4)
SODIUM SERPL-SCNC: 139 MMOL/L (ref 136–145)
TESTOST SERPL-MCNC: 29 NG/DL (ref 5–73)
TSH SERPL DL<=0.005 MIU/L-ACNC: 1.55 UIU/ML (ref 0.4–4)
WBC # BLD AUTO: 6.82 K/UL (ref 3.9–12.7)

## 2020-09-19 PROCEDURE — 84146 ASSAY OF PROLACTIN: CPT

## 2020-09-19 PROCEDURE — 85025 COMPLETE CBC W/AUTO DIFF WBC: CPT

## 2020-09-19 PROCEDURE — 83498 ASY HYDROXYPROGESTERONE 17-D: CPT

## 2020-09-19 PROCEDURE — 83001 ASSAY OF GONADOTROPIN (FSH): CPT

## 2020-09-19 PROCEDURE — 82533 TOTAL CORTISOL: CPT

## 2020-09-19 PROCEDURE — 83036 HEMOGLOBIN GLYCOSYLATED A1C: CPT

## 2020-09-19 PROCEDURE — 84443 ASSAY THYROID STIM HORMONE: CPT

## 2020-09-19 PROCEDURE — 83002 ASSAY OF GONADOTROPIN (LH): CPT

## 2020-09-19 PROCEDURE — 36415 COLL VENOUS BLD VENIPUNCTURE: CPT

## 2020-09-19 PROCEDURE — 80053 COMPREHEN METABOLIC PANEL: CPT

## 2020-09-19 PROCEDURE — 84702 CHORIONIC GONADOTROPIN TEST: CPT

## 2020-09-19 PROCEDURE — 83525 ASSAY OF INSULIN: CPT

## 2020-09-19 PROCEDURE — 84403 ASSAY OF TOTAL TESTOSTERONE: CPT

## 2020-09-20 DIAGNOSIS — E66.01 CLASS 3 SEVERE OBESITY DUE TO EXCESS CALORIES WITH BODY MASS INDEX (BMI) OF 40.0 TO 44.9 IN ADULT, UNSPECIFIED WHETHER SERIOUS COMORBIDITY PRESENT: Primary | ICD-10-CM

## 2020-09-20 LAB
HSV1 DNA SPEC QL NAA+PROBE: NEGATIVE
HSV2 DNA SPEC QL NAA+PROBE: POSITIVE
SPECIMEN SOURCE: ABNORMAL

## 2020-09-20 RX ORDER — VALACYCLOVIR HYDROCHLORIDE 1 G/1
1000 TABLET, FILM COATED ORAL 2 TIMES DAILY
Qty: 14 TABLET | Refills: 0 | Status: SHIPPED | OUTPATIENT
Start: 2020-09-20 | End: 2020-12-04

## 2020-09-22 ENCOUNTER — TELEPHONE (OUTPATIENT)
Dept: SURGERY | Facility: CLINIC | Age: 40
End: 2020-09-22

## 2020-09-22 NOTE — TELEPHONE ENCOUNTER
----- Message from Tania Wharton RD sent at 9/22/2020  9:18 AM CDT -----  Hey yall! This is a patient needing scheduling with me. Please reach out to the patient/ensure the referral is placed properly.   Thanks!  Tania wharton  ----- Message -----  From: Dong Clark MA  Sent: 9/22/2020   9:02 AM CDT  To: Crow Jaquez    Good Morning,     This patient has been referred to Nutrition by Joy Shelley DNP. Please assist with scheduling. Thank you.

## 2020-09-22 NOTE — TELEPHONE ENCOUNTER
Called the number provided no ans left msg via voicemail to call back and call back number was provided.

## 2020-09-25 ENCOUNTER — PATIENT MESSAGE (OUTPATIENT)
Dept: OBSTETRICS AND GYNECOLOGY | Facility: CLINIC | Age: 40
End: 2020-09-25

## 2020-09-25 LAB — 17OHP SERPL-MCNC: 121 NG/DL (ref 35–413)

## 2020-09-29 ENCOUNTER — OFFICE VISIT (OUTPATIENT)
Dept: INTERNAL MEDICINE | Facility: CLINIC | Age: 40
End: 2020-09-29
Payer: COMMERCIAL

## 2020-09-29 DIAGNOSIS — F41.9 ANXIETY AND DEPRESSION: Primary | ICD-10-CM

## 2020-09-29 DIAGNOSIS — M62.838 MUSCLE SPASMS OF NECK: ICD-10-CM

## 2020-09-29 DIAGNOSIS — F32.A ANXIETY AND DEPRESSION: Primary | ICD-10-CM

## 2020-09-29 PROCEDURE — 99213 PR OFFICE/OUTPT VISIT, EST, LEVL III, 20-29 MIN: ICD-10-PCS | Mod: 95,,, | Performed by: FAMILY MEDICINE

## 2020-09-29 PROCEDURE — 99213 OFFICE O/P EST LOW 20 MIN: CPT | Mod: 95,,, | Performed by: FAMILY MEDICINE

## 2020-09-29 RX ORDER — ALPRAZOLAM 0.5 MG/1
0.5 TABLET ORAL 2 TIMES DAILY PRN
Qty: 60 TABLET | Refills: 2 | Status: SHIPPED | OUTPATIENT
Start: 2020-09-29 | End: 2021-04-13

## 2020-09-29 RX ORDER — TRAZODONE HYDROCHLORIDE 100 MG/1
100 TABLET ORAL NIGHTLY
Qty: 30 TABLET | Refills: 11 | Status: SHIPPED | OUTPATIENT
Start: 2020-09-29 | End: 2021-10-26 | Stop reason: SDUPTHER

## 2020-09-29 RX ORDER — CYCLOBENZAPRINE HCL 10 MG
10 TABLET ORAL 3 TIMES DAILY PRN
Qty: 60 TABLET | Refills: 1 | Status: SHIPPED | OUTPATIENT
Start: 2020-09-29 | End: 2020-10-09

## 2020-09-29 RX ORDER — ESCITALOPRAM OXALATE 10 MG/1
10 TABLET ORAL DAILY
Qty: 30 TABLET | Refills: 5 | Status: SHIPPED | OUTPATIENT
Start: 2020-09-29 | End: 2021-10-26 | Stop reason: SDUPTHER

## 2020-09-30 ENCOUNTER — PATIENT MESSAGE (OUTPATIENT)
Dept: OBSTETRICS AND GYNECOLOGY | Facility: CLINIC | Age: 40
End: 2020-09-30

## 2020-09-30 NOTE — TELEPHONE ENCOUNTER
Pt wanted to inform you that she has completed her medication and experienced really bad headaches. She has not had a headache today, and today was the first full day not taking the medication.

## 2020-10-01 ENCOUNTER — PATIENT OUTREACH (OUTPATIENT)
Dept: ADMINISTRATIVE | Facility: OTHER | Age: 40
End: 2020-10-01

## 2020-10-01 NOTE — PROGRESS NOTES
The patient location is: home  The chief complaint leading to consultation is: depression    Visit type: audiovisual    Face to Face time with patient: 11 minutes  13 minutes of total time spent on the encounter, which includes face to face time and non-face to face time preparing to see the patient (eg, review of tests), Obtaining and/or reviewing separately obtained history, Documenting clinical information in the electronic or other health record, Independently interpreting results (not separately reported) and communicating results to the patient/family/caregiver, or Care coordination (not separately reported).         Each patient to whom he or she provides medical services by telemedicine is:  (1) informed of the relationship between the physician and patient and the respective role of any other health care provider with respect to management of the patient; and (2) notified that he or she may decline to receive medical services by telemedicine and may withdraw from such care at any time.    Notes:     Subjective:      Patient ID: Karen Rudolph is a 40 y.o. female.    Chief Complaint: Depression      Patient reports worsened depression, anxiety.  Does have increased family stressors currently as well as increased stress from her job.   Reports crying frequently, not sleeping well at night, increased anxiety with some panic attacks. She has been out of lexapro for the past 3 months as she ran out , was doing well while on it.   She also reports bilateral neck soreness, sore on side of neck, some stiffness.     Review of Systems   Constitutional: Negative for activity change and unexpected weight change.   HENT: Negative for hearing loss, rhinorrhea and trouble swallowing.    Eyes: Negative for discharge and visual disturbance.   Respiratory: Negative for chest tightness and wheezing.    Cardiovascular: Positive for chest pain. Negative for palpitations.   Gastrointestinal: Negative for blood in stool,  constipation, diarrhea and vomiting.   Endocrine: Negative for polydipsia and polyuria.   Genitourinary: Negative for difficulty urinating, dysuria, hematuria and menstrual problem.   Musculoskeletal: Positive for neck stiffness. Negative for arthralgias, joint swelling and neck pain.   Neurological: Positive for headaches. Negative for weakness.   Psychiatric/Behavioral: Positive for dysphoric mood. Negative for confusion.     Past Medical History:   Diagnosis Date    Cholelithiasis     Chronic back pain           Past Surgical History:   Procedure Laterality Date     SECTION      COLPOSCOPY       Family History   Problem Relation Age of Onset    Diabetes Mother     Hypertension Mother      Social History     Socioeconomic History    Marital status: Single     Spouse name: Not on file    Number of children: Not on file    Years of education: Not on file    Highest education level: Not on file   Occupational History    Not on file   Social Needs    Financial resource strain: Somewhat hard    Food insecurity     Worry: Sometimes true     Inability: Patient refused    Transportation needs     Medical: No     Non-medical: No   Tobacco Use    Smoking status: Current Every Day Smoker     Types: Cigarettes    Smokeless tobacco: Current User   Substance and Sexual Activity    Alcohol use: Yes     Alcohol/week: 0.0 standard drinks     Frequency: 2-3 times a week     Drinks per session: 3 or 4     Binge frequency: Less than monthly    Drug use: No    Sexual activity: Yes     Partners: Male   Lifestyle    Physical activity     Days per week: 0 days     Minutes per session: 0 min    Stress: Very much   Relationships    Social connections     Talks on phone: More than three times a week     Gets together: Twice a week     Attends Christian service: Not on file     Active member of club or organization: No     Attends meetings of clubs or organizations: Patient refused     Relationship status: Never     Other Topics Concern    Not on file   Social History Narrative    Not on file     Review of patient's allergies indicates:  No Known Allergies    Objective:       LMP 08/30/2020   Physical Exam  Constitutional:       General: She is not in acute distress.     Appearance: Normal appearance. She is well-developed. She is not ill-appearing or diaphoretic.   Neck:      Musculoskeletal: Neck supple. No neck rigidity.   Pulmonary:      Effort: No respiratory distress.   Neurological:      Mental Status: She is alert and oriented to person, place, and time.   Psychiatric:         Mood and Affect: Mood normal.         Behavior: Behavior normal.         Thought Content: Thought content normal.         Judgment: Judgment normal.       Assessment:     1. Anxiety and depression    2. Muscle spasms of neck      Plan:   Anxiety and depression    Muscle spasms of neck    Other orders  -     traZODone (DESYREL) 100 MG tablet; Take 1 tablet (100 mg total) by mouth every evening.  Dispense: 30 tablet; Refill: 11  -     cyclobenzaprine (FLEXERIL) 10 MG tablet; Take 1 tablet (10 mg total) by mouth 3 (three) times daily as needed for Muscle spasms.  Dispense: 60 tablet; Refill: 1  -     escitalopram oxalate (LEXAPRO) 10 MG tablet; Take 1 tablet (10 mg total) by mouth once daily.  Dispense: 30 tablet; Refill: 5  -     ALPRAZolam (XANAX) 0.5 MG tablet; Take 1 tablet (0.5 mg total) by mouth 2 (two) times daily as needed for Anxiety.  Dispense: 60 tablet; Refill: 2      Medication List with Changes/Refills   New Medications    CYCLOBENZAPRINE (FLEXERIL) 10 MG TABLET    Take 1 tablet (10 mg total) by mouth 3 (three) times daily as needed for Muscle spasms.    TRAZODONE (DESYREL) 100 MG TABLET    Take 1 tablet (100 mg total) by mouth every evening.   Current Medications    CLINDAMYCIN PHOSPHATE 1% (CLEOCIN T) 1 % GEL    Apply topically 2 (two) times daily.    GABAPENTIN (NEURONTIN) 100 MG CAPSULE    Take 1 capsule (100 mg total)  by mouth 3 (three) times daily as needed.    MELOXICAM (MOBIC) 15 MG TABLET        VALACYCLOVIR (VALTREX) 1000 MG TABLET    Take 1 tablet (1,000 mg total) by mouth 2 (two) times daily. for 7 days   Changed and/or Refilled Medications    Modified Medication Previous Medication    ALPRAZOLAM (XANAX) 0.5 MG TABLET ALPRAZolam (XANAX) 0.5 MG tablet       Take 1 tablet (0.5 mg total) by mouth 2 (two) times daily as needed for Anxiety.    Take 1 tablet (0.5 mg total) by mouth 2 (two) times daily as needed for Anxiety.    ESCITALOPRAM OXALATE (LEXAPRO) 10 MG TABLET escitalopram oxalate (LEXAPRO) 10 MG tablet       Take 1 tablet (10 mg total) by mouth once daily.    Take 1 tablet (10 mg total) by mouth once daily.   Discontinued Medications    TIZANIDINE (ZANAFLEX) 4 MG TABLET

## 2020-10-01 NOTE — PROGRESS NOTES
"Nutrition Assessment  Client name:  Karen Rudolph  :  1980  Age:  40 y.o.  Gender:  female  Wt mngt  Ref Provider: Joy Shelley  Client states:  She is wanting to lose weight to help maintain a healtheir lifestyle. She has a family history of overweightness/ obesity with comorbitities and is trying to avoid developing more serious health condititions. States that she may eat once a day. Is not a big snacker and does not eat very healthy. Has been trying to increase fluid intake and vegetable intake in past 2 weeks. Does not enjoy fried foods. Usual body weight was 285-295 lbs for a few years. Did not notice weight gain until  Appointment 2 weeks ago which shocked her. Presents today desiring basic nutrition information/tips.     Anthropometrics  Height:  5' 9"    Weight: 297 lbs  BMI:  43.89 kg/m2    Clinical Signs/Symptoms  N/V/D:  None  Appetite:  fair       Past Medical History:   Diagnosis Date    Cholelithiasis     Chronic back pain        Past Surgical History:   Procedure Laterality Date     SECTION      COLPOSCOPY         Medications    has a current medication list which includes the following prescription(s): alprazolam, clindamycin phosphate 1%, cyclobenzaprine, escitalopram oxalate, gabapentin, meloxicam, trazodone, and valacyclovir, and the following Facility-Administered Medications: medroxyprogesterone.    Vitamins, Minerals, and/or Supplements: None at this time    Food/Medication Interactions:  Reviewed     Food Allergies or Intolerances:  None     Social History    Marital status:  Single  Employment:  8A-5[employer] as a e[employer] for [employer] 5 days a week. Gets 1 hour lunch break 4 days a week. Job is mostly sednetary. Working from home currently.     Social History     Tobacco Use    Smoking status: Current Every Day Smoker     Types: Cigarettes    Smokeless tobacco: Current User   Substance Use Topics    Alcohol use: Yes     Alcohol/week: 0.0 standard drinks     Frequency: " 2-3 times a week     Drinks per session: 3 or 4     Binge frequency: Less than monthly        Lab Reports     Cholesterol 120 - 199 mg/dL 218High      Otherwise WNL        Food History  Breakfast:  Coffee with Cream (unsure of amount) and 1 tbsp Sugar  Lunch:  Skipped  Snacks: 2 cups craisins throughout the day   Dinner:  2 wings Baked Chicken, 1 cup Rice, Gravy, 1/3  Cup Corn, 3/4 quateres glass of red wine    *Fluid intake:  16 oz of coffee, 8 oz of wine, 3 17 oz bottles of water per day    Exercise History:  Currently little to none, climbs stairs at home 5-7 times per day. In the past, she was walking regularly. No limitations to exercise.     Cultural/Spiritual/Personal Preferences:  None identified    Support System:  parents    State of Change:  Contemplation    Barriers to Change:  Life long dietary habits, sedentary job    Diagnosis    Obesity realted to physical inactivity and lifelong consumption of energy dense foods as evidenced by BMI of 43.89 kg/m2.  Food and nutrition related knowledge deficit related general healthful diet as evidenced by no prior education with an  RD before today.   Inadequete oral intake related to disordered eating pattern as evidenced by pt reporting only eating one meal a day.   Inadequate fluid intake related to weight management and dehydrations as evidenced by 40% of fluid goal being met.       Intervention    Calorie Needs (via [place] Jeor):  Activity Factor:  1.2   - Maintenance: 7508-5361 kcal   -Loss: 4152-2983 kcal for loss of 1-2 lbs per week  Protein (via 0.8 g/kg): 110-120 gm  Fluid (Holiday Segar): 3858 ml 129 oz      Goals:  1.  Will eat 3 meals a day 3 days a week, 1-2 meals can be protein drinks   2. Will drink 4 bottles of water per day   3.  Will work out for about an hour 2 days a week.     Nutrition Education  Educated patient on MyPlate way of eating. This encourages portion control of fruits + veggies (1/2 plate), grains (1/4 plate), and protein (1/4  plate). Discussed proper choices in each categories such as lean proteins, whole grains, and minimally processed fruits and vegetables. Touched on what a reasonable portion of fat per day is. Discussed importance of eating small, regular meals to help metabolism and discussed different applications to help with lifestyle modifications.     Patient verbalized understanding of nutrition education and recommendations received.    Handouts Provided  Polina MyPlate+ Healthy Snacks   1500 calorie 5 day sample meal plan   Eat Fit Stella  Fit On Stella  Mealime Stella  Calorie Arthur Stella    Monitoring/Evaluation    Monitor the following:  Weight  BMI  Caloric intake      Follow Up Plan:  PRN

## 2020-10-02 ENCOUNTER — NUTRITION (OUTPATIENT)
Dept: NUTRITION | Facility: CLINIC | Age: 40
End: 2020-10-02
Payer: COMMERCIAL

## 2020-10-02 VITALS — HEIGHT: 69 IN | WEIGHT: 293 LBS | BODY MASS INDEX: 43.4 KG/M2

## 2020-10-02 DIAGNOSIS — E66.01 CLASS 3 SEVERE OBESITY DUE TO EXCESS CALORIES WITH BODY MASS INDEX (BMI) OF 40.0 TO 44.9 IN ADULT, UNSPECIFIED WHETHER SERIOUS COMORBIDITY PRESENT: ICD-10-CM

## 2020-10-02 DIAGNOSIS — Z71.3 DIETARY COUNSELING: Primary | ICD-10-CM

## 2020-10-02 PROCEDURE — 99999 PR PBB SHADOW E&M-EST. PATIENT-LVL II: CPT | Mod: PBBFAC,,, | Performed by: DIETITIAN, REGISTERED

## 2020-10-02 PROCEDURE — 99999 PR PBB SHADOW E&M-EST. PATIENT-LVL II: ICD-10-PCS | Mod: PBBFAC,,, | Performed by: DIETITIAN, REGISTERED

## 2020-10-02 PROCEDURE — 97802 PR MED NUTR THER, 1ST, INDIV, EA 15 MIN: ICD-10-PCS | Mod: S$GLB,,, | Performed by: DIETITIAN, REGISTERED

## 2020-10-02 PROCEDURE — 97802 MEDICAL NUTRITION INDIV IN: CPT | Mod: S$GLB,,, | Performed by: DIETITIAN, REGISTERED

## 2020-10-02 NOTE — LETTER
October 2, 2020        Joy Shelley NP  [id number] [place] [id number]             [facility]  [address]  [place] [address]  Phone: [phone]  Fax: [phone]   Patient: Karen Rudolph   MR Number: [id number]   YOB: 1980   Date of Visit: 10/2/2020       Dear Dr. Shelley:    Thank you for referring Karen Rudolph to me for evaluation. Below are the relevant portions of my assessment and plan of care.     Set Goals with patient of:  Goals:  1.  Will eat 3 meals a day 3 days a week, 1-2 meals can be protein drinks   2. Will drink 4 bottles of water per day   3.  Will work out for about an hour 2 days a week.        If you have questions, please do not hesitate to call me. I look forward to following Karen along with you.    Sincerely,      Tania Wharton, SHELIA           CC  No Recipients

## 2020-10-18 DIAGNOSIS — Z01.84 ANTIBODY RESPONSE EXAMINATION: ICD-10-CM

## 2020-11-13 ENCOUNTER — PATIENT MESSAGE (OUTPATIENT)
Dept: INTERNAL MEDICINE | Facility: CLINIC | Age: 40
End: 2020-11-13

## 2020-11-17 ENCOUNTER — IMMUNIZATION (OUTPATIENT)
Dept: INTERNAL MEDICINE | Facility: CLINIC | Age: 40
End: 2020-11-17
Payer: COMMERCIAL

## 2020-11-17 DIAGNOSIS — Z01.84 ANTIBODY RESPONSE EXAMINATION: ICD-10-CM

## 2020-11-17 PROCEDURE — 90471 IMMUNIZATION ADMIN: CPT | Mod: S$GLB,,, | Performed by: INTERNAL MEDICINE

## 2020-11-17 PROCEDURE — 90686 FLU VACCINE (QUAD) GREATER THAN OR EQUAL TO 3YO PRESERVATIVE FREE IM: ICD-10-PCS | Mod: S$GLB,,, | Performed by: INTERNAL MEDICINE

## 2020-11-17 PROCEDURE — 90686 IIV4 VACC NO PRSV 0.5 ML IM: CPT | Mod: S$GLB,,, | Performed by: INTERNAL MEDICINE

## 2020-11-17 PROCEDURE — 90471 FLU VACCINE (QUAD) GREATER THAN OR EQUAL TO 3YO PRESERVATIVE FREE IM: ICD-10-PCS | Mod: S$GLB,,, | Performed by: INTERNAL MEDICINE

## 2020-11-17 NOTE — PROGRESS NOTES
After reviewing allergies and medications. Pt received a regular flu vaccine. Pt tolerated well. Also advised pt to wait in clinic for 15 mins to monitor s/s of adverse reaction. Pt verbalized understanding.    LOLA Peter

## 2020-12-04 ENCOUNTER — PATIENT MESSAGE (OUTPATIENT)
Dept: OBSTETRICS AND GYNECOLOGY | Facility: CLINIC | Age: 40
End: 2020-12-04

## 2020-12-04 RX ORDER — VALACYCLOVIR HYDROCHLORIDE 1 G/1
1000 TABLET, FILM COATED ORAL 2 TIMES DAILY
Qty: 20 TABLET | Refills: 0 | Status: SHIPPED | OUTPATIENT
Start: 2020-12-04 | End: 2020-12-27

## 2020-12-04 RX ORDER — VALACYCLOVIR HYDROCHLORIDE 1 G/1
1000 TABLET, FILM COATED ORAL 2 TIMES DAILY
Qty: 20 TABLET | Refills: 0 | Status: SHIPPED | OUTPATIENT
Start: 2020-12-04 | End: 2020-12-04 | Stop reason: SDUPTHER

## 2020-12-14 ENCOUNTER — TELEPHONE (OUTPATIENT)
Dept: OBSTETRICS AND GYNECOLOGY | Facility: CLINIC | Age: 40
End: 2020-12-14

## 2020-12-14 NOTE — TELEPHONE ENCOUNTER
----- Message from Lidia Resendiz sent at 12/14/2020 12:32 PM CST -----  Contact: samm/ [facility] pharmacy  Samm is calling because the prescription Veltrex that was sent over 12/04 and it said name brand only and the medication is 500$ and wanted to know was that an error. May you give them a call back at [phone].      [id number] [place] - [address]  [address] [place] [address]  Phone: [phone] Fax: [phone]    Thanks  KT

## 2020-12-15 ENCOUNTER — PATIENT MESSAGE (OUTPATIENT)
Dept: OTOLARYNGOLOGY | Facility: CLINIC | Age: 40
End: 2020-12-15

## 2020-12-28 ENCOUNTER — PATIENT MESSAGE (OUTPATIENT)
Dept: NEUROLOGY | Facility: CLINIC | Age: 40
End: 2020-12-28

## 2020-12-28 ENCOUNTER — PATIENT MESSAGE (OUTPATIENT)
Dept: OTOLARYNGOLOGY | Facility: CLINIC | Age: 40
End: 2020-12-28

## 2020-12-30 ENCOUNTER — TELEPHONE (OUTPATIENT)
Dept: SURGERY | Facility: CLINIC | Age: 40
End: 2020-12-30

## 2020-12-30 RX ORDER — FAMCICLOVIR 500 MG/1
500 TABLET ORAL 3 TIMES DAILY
Qty: 21 TABLET | Refills: 0 | Status: SHIPPED | OUTPATIENT
Start: 2020-12-30 | End: 2021-03-23 | Stop reason: SDUPTHER

## 2020-12-30 NOTE — TELEPHONE ENCOUNTER
----- Message from Carla Huff RD, CDE sent at 12/28/2020  4:43 PM CST -----    ----- Message -----  From: Meg Pittman  Sent: 12/28/2020   3:32 PM CST  To: , #    pt needs call back to discuss weight loss surgery discussed at first visit..[phone] (home)

## 2020-12-30 NOTE — TELEPHONE ENCOUNTER
Returned call pt states, will call back. During conversation call back number was provided. Pt voiced an understanding

## 2021-01-23 ENCOUNTER — OFFICE VISIT (OUTPATIENT)
Dept: URGENT CARE | Facility: CLINIC | Age: 41
End: 2021-01-23
Payer: COMMERCIAL

## 2021-01-23 ENCOUNTER — HOSPITAL ENCOUNTER (OUTPATIENT)
Dept: RADIOLOGY | Facility: HOSPITAL | Age: 41
Discharge: HOME | End: 2021-01-23
Attending: NURSE PRACTITIONER
Payer: COMMERCIAL

## 2021-01-23 VITALS
DIASTOLIC BLOOD PRESSURE: 90 MMHG | SYSTOLIC BLOOD PRESSURE: 160 MMHG | HEART RATE: 91 BPM | BODY MASS INDEX: 43.72 KG/M2 | WEIGHT: 293 LBS | OXYGEN SATURATION: 100 % | TEMPERATURE: 99 F
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
## 2021-01-23 PROCEDURE — 3008F BODY MASS INDEX DOCD: CPT | Mod: CPTII,S$GLB,, | Performed by: NURSE PRACTITIONER

## 2021-01-23 PROCEDURE — 96372 THER/PROPH/DIAG INJ SC/IM: CPT | Mod: S$GLB,,, | Performed by: NURSE PRACTITIONER

## 2021-01-23 PROCEDURE — 1125F AMNT PAIN NOTED PAIN PRSNT: CPT | Mod: S$GLB,,, | Performed by: NURSE PRACTITIONER

## 2021-01-23 PROCEDURE — 72110 X-RAY EXAM L-2 SPINE 4/>VWS: CPT | Mod: 26,,, | Performed by: RADIOLOGY

## 2021-01-23 PROCEDURE — 72050 XR CERVICAL SPINE COMPLETE 5 VIEW: ICD-10-PCS | Mod: 26,,, | Performed by: RADIOLOGY

## 2021-01-23 PROCEDURE — 71046 XR CHEST PA AND LATERAL: ICD-10-PCS | Mod: 26,,, | Performed by: RADIOLOGY

## 2021-01-23 PROCEDURE — 96372 PR INJECTION,THERAP/PROPH/DIAG2ST, IM OR SUBCUT: ICD-10-PCS | Mod: S$GLB,,, | Performed by: NURSE PRACTITIONER

## 2021-01-23 PROCEDURE — 99999 PR PBB SHADOW E&M-EST. PATIENT-LVL IV: CPT | Mod: PBBFAC,,, | Performed by: NURSE PRACTITIONER

## 2021-01-23 PROCEDURE — 72110 X-RAY EXAM L-2 SPINE 4/>VWS: CPT | Mod: TC,PO

## 2021-01-23 PROCEDURE — 72110 XR LUMBAR SPINE COMPLETE 5 VIEW: ICD-10-PCS | Mod: 26,,, | Performed by: RADIOLOGY

## 2021-01-23 PROCEDURE — 3008F PR BODY MASS INDEX (BMI) DOCUMENTED: ICD-10-PCS | Mod: CPTII,S$GLB,, | Performed by: NURSE PRACTITIONER

## 2021-01-23 PROCEDURE — 99999 PR PBB SHADOW E&M-EST. PATIENT-LVL IV: ICD-10-PCS | Mod: PBBFAC,,, | Performed by: NURSE PRACTITIONER

## 2021-01-23 PROCEDURE — 72050 X-RAY EXAM NECK SPINE 4/5VWS: CPT | Mod: TC,PO

## 2021-01-23 PROCEDURE — 72050 X-RAY EXAM NECK SPINE 4/5VWS: CPT | Mod: 26,,, | Performed by: RADIOLOGY

## 2021-01-23 PROCEDURE — 71046 X-RAY EXAM CHEST 2 VIEWS: CPT | Mod: 26,,, | Performed by: RADIOLOGY

## 2021-01-23 PROCEDURE — 99215 PR OFFICE/OUTPT VISIT, EST, LEVL V, 40-54 MIN: ICD-10-PCS | Mod: 25,S$GLB,, | Performed by: NURSE PRACTITIONER

## 2021-01-23 PROCEDURE — 71046 X-RAY EXAM CHEST 2 VIEWS: CPT | Mod: TC,PO

## 2021-01-23 PROCEDURE — 99215 OFFICE O/P EST HI 40 MIN: CPT | Mod: 25,S$GLB,, | Performed by: NURSE PRACTITIONER

## 2021-01-23 PROCEDURE — 1125F PR PAIN SEVERITY QUANTIFIED, PAIN PRESENT: ICD-10-PCS | Mod: S$GLB,,, | Performed by: NURSE PRACTITIONER

## 2021-01-23 RX ORDER — KETOROLAC TROMETHAMINE 10 MG/1
10 TABLET, FILM COATED ORAL EVERY 6 HOURS PRN
Qty: 16 TABLET | Refills: 0 | Status: SHIPPED | OUTPATIENT
Start: 2021-01-23 | End: 2021-01-27

## 2021-01-23 RX ORDER — KETOROLAC TROMETHAMINE 30 MG/ML
30 INJECTION, SOLUTION INTRAMUSCULAR; INTRAVENOUS
Status: COMPLETED | OUTPATIENT
Start: 2021-01-23 | End: 2021-01-23

## 2021-01-23 RX ADMIN — KETOROLAC TROMETHAMINE 30 MG: 30 INJECTION, SOLUTION INTRAMUSCULAR; INTRAVENOUS at 12:01

## 2021-01-25 ENCOUNTER — PATIENT MESSAGE (OUTPATIENT)
Dept: INTERNAL MEDICINE | Facility: CLINIC | Age: 41
End: 2021-01-25

## 2021-01-27 ENCOUNTER — OFFICE VISIT (OUTPATIENT)
Dept: INTERNAL MEDICINE | Facility: CLINIC | Age: 41
End: 2021-01-27
Payer: COMMERCIAL

## 2021-01-27 ENCOUNTER — PATIENT MESSAGE (OUTPATIENT)
Dept: INTERNAL MEDICINE | Facility: CLINIC | Age: 41
End: 2021-01-27

## 2021-01-27 VITALS
HEIGHT: 69 IN | SYSTOLIC BLOOD PRESSURE: 122 MMHG | DIASTOLIC BLOOD PRESSURE: 80 MMHG | OXYGEN SATURATION: 100 % | TEMPERATURE: 97 F | BODY MASS INDEX: 43.4 KG/M2 | WEIGHT: 293 LBS | HEART RATE: 96 BPM

## 2021-01-27 DIAGNOSIS — M62.838 MUSCLE SPASMS OF NECK: ICD-10-CM
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
## 2021-01-27 DIAGNOSIS — R51.9 NONINTRACTABLE HEADACHE, UNSPECIFIED CHRONICITY PATTERN, UNSPECIFIED HEADACHE TYPE: ICD-10-CM

## 2021-01-27 DIAGNOSIS — M54.9 BACK PAIN, UNSPECIFIED BACK LOCATION, UNSPECIFIED BACK PAIN LATERALITY, UNSPECIFIED CHRONICITY: ICD-10-CM

## 2021-01-27 PROCEDURE — 99214 OFFICE O/P EST MOD 30 MIN: CPT | Mod: S$GLB,,, | Performed by: FAMILY MEDICINE

## 2021-01-27 PROCEDURE — 3008F PR BODY MASS INDEX (BMI) DOCUMENTED: ICD-10-PCS | Mod: CPTII,S$GLB,, | Performed by: FAMILY MEDICINE

## 2021-01-27 PROCEDURE — 99999 PR PBB SHADOW E&M-EST. PATIENT-LVL IV: CPT | Mod: PBBFAC,,, | Performed by: FAMILY MEDICINE

## 2021-01-27 PROCEDURE — 1125F AMNT PAIN NOTED PAIN PRSNT: CPT | Mod: S$GLB,,, | Performed by: FAMILY MEDICINE

## 2021-01-27 PROCEDURE — 99999 PR PBB SHADOW E&M-EST. PATIENT-LVL IV: ICD-10-PCS | Mod: PBBFAC,,, | Performed by: FAMILY MEDICINE

## 2021-01-27 PROCEDURE — 3008F BODY MASS INDEX DOCD: CPT | Mod: CPTII,S$GLB,, | Performed by: FAMILY MEDICINE

## 2021-01-27 PROCEDURE — 1125F PR PAIN SEVERITY QUANTIFIED, PAIN PRESENT: ICD-10-PCS | Mod: S$GLB,,, | Performed by: FAMILY MEDICINE

## 2021-01-27 PROCEDURE — 99214 PR OFFICE/OUTPT VISIT, EST, LEVL IV, 30-39 MIN: ICD-10-PCS | Mod: S$GLB,,, | Performed by: FAMILY MEDICINE

## 2021-01-27 RX ORDER — NAPROXEN 500 MG/1
500 TABLET ORAL 2 TIMES DAILY
Qty: 20 TABLET | Refills: 0 | Status: SHIPPED | OUTPATIENT
Start: 2021-01-27 | End: 2021-10-21

## 2021-01-27 RX ORDER — TRAMADOL HYDROCHLORIDE 50 MG/1
50 TABLET ORAL EVERY 6 HOURS
Qty: 20 TABLET | Refills: 0 | Status: SHIPPED | OUTPATIENT
Start: 2021-01-27 | End: 2021-10-21

## 2021-01-27 RX ORDER — CYCLOBENZAPRINE HCL 10 MG
10 TABLET ORAL 3 TIMES DAILY PRN
Qty: 30 TABLET | Refills: 0 | Status: SHIPPED | OUTPATIENT
Start: 2021-01-27 | End: 2021-02-06

## 2021-01-28 ENCOUNTER — TELEPHONE (OUTPATIENT)
Dept: INTERNAL MEDICINE | Facility: CLINIC | Age: 41
End: 2021-01-28

## 2021-02-01 ENCOUNTER — PATIENT MESSAGE (OUTPATIENT)
Dept: INTERNAL MEDICINE | Facility: CLINIC | Age: 41
End: 2021-02-01

## 2021-02-02 ENCOUNTER — CLINICAL SUPPORT (OUTPATIENT)
Dept: REHABILITATION | Facility: HOSPITAL | Age: 41
End: 2021-02-02
Payer: COMMERCIAL

## 2021-02-02 DIAGNOSIS — R53.1 DECREASED STRENGTH, ENDURANCE, AND MOBILITY: ICD-10-CM

## 2021-02-02 DIAGNOSIS — R68.89 DECREASED STRENGTH, ENDURANCE, AND MOBILITY: ICD-10-CM

## 2021-02-02 DIAGNOSIS — M62.838 MUSCLE SPASMS OF NECK: ICD-10-CM

## 2021-02-02 DIAGNOSIS — R29.3 POOR POSTURE: ICD-10-CM

## 2021-02-02 DIAGNOSIS — M62.89 ABNORMAL INCREASED MUSCLE TIGHTNESS: ICD-10-CM
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
## 2021-02-02 DIAGNOSIS — M54.9 BACK PAIN, UNSPECIFIED BACK LOCATION, UNSPECIFIED BACK PAIN LATERALITY, UNSPECIFIED CHRONICITY: ICD-10-CM

## 2021-02-02 DIAGNOSIS — Z74.09 DECREASED STRENGTH, ENDURANCE, AND MOBILITY: ICD-10-CM

## 2021-02-02 PROCEDURE — 97110 THERAPEUTIC EXERCISES: CPT

## 2021-02-02 PROCEDURE — 97162 PT EVAL MOD COMPLEX 30 MIN: CPT

## 2021-02-03 PROBLEM — M62.89 ABNORMAL INCREASED MUSCLE TIGHTNESS: Status: ACTIVE | Noted: 2021-02-03

## 2021-02-03 PROBLEM — R29.3 POOR POSTURE: Status: ACTIVE | Noted: 2021-02-03

## 2021-02-03 PROBLEM — R68.89 DECREASED STRENGTH, ENDURANCE, AND MOBILITY: Status: ACTIVE | Noted: 2021-02-03

## 2021-02-03 PROBLEM — R53.1 DECREASED STRENGTH, ENDURANCE, AND MOBILITY: Status: ACTIVE | Noted: 2021-02-03

## 2021-02-03 PROBLEM — Z74.09 DECREASED STRENGTH, ENDURANCE, AND MOBILITY: Status: ACTIVE | Noted: 2021-02-03

## 2021-02-04 ENCOUNTER — CLINICAL SUPPORT (OUTPATIENT)
Dept: REHABILITATION | Facility: HOSPITAL | Age: 41
End: 2021-02-04
Payer: COMMERCIAL

## 2021-02-04 ENCOUNTER — PATIENT MESSAGE (OUTPATIENT)
Dept: INTERNAL MEDICINE | Facility: CLINIC | Age: 41
End: 2021-02-04

## 2021-02-04 DIAGNOSIS — R68.89 DECREASED STRENGTH, ENDURANCE, AND MOBILITY: ICD-10-CM

## 2021-02-04 DIAGNOSIS — R29.3 POOR POSTURE: ICD-10-CM

## 2021-02-04 DIAGNOSIS — Z74.09 DECREASED STRENGTH, ENDURANCE, AND MOBILITY: ICD-10-CM

## 2021-02-04 DIAGNOSIS — R53.1 DECREASED STRENGTH, ENDURANCE, AND MOBILITY: ICD-10-CM

## 2021-02-04 DIAGNOSIS — M62.89 ABNORMAL INCREASED MUSCLE TIGHTNESS: ICD-10-CM

## 2021-02-04 PROCEDURE — 97110 THERAPEUTIC EXERCISES: CPT

## 2021-02-04 PROCEDURE — 95851 RANGE OF MOTION MEASUREMENTS: CPT

## 2021-02-04 PROCEDURE — 97140 MANUAL THERAPY 1/> REGIONS: CPT

## 2021-02-09 ENCOUNTER — PATIENT MESSAGE (OUTPATIENT)
Dept: INTERNAL MEDICINE | Facility: CLINIC | Age: 41
End: 2021-02-09

## 2021-02-11 ENCOUNTER — OFFICE VISIT (OUTPATIENT)
Dept: INTERNAL MEDICINE | Facility: CLINIC | Age: 41
End: 2021-02-11
Payer: COMMERCIAL

## 2021-02-11 VITALS
HEIGHT: 69 IN | HEART RATE: 93 BPM | WEIGHT: 293 LBS | BODY MASS INDEX: 43.4 KG/M2 | SYSTOLIC BLOOD PRESSURE: 136 MMHG | OXYGEN SATURATION: 100 % | DIASTOLIC BLOOD PRESSURE: 72 MMHG | TEMPERATURE: 97 F
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
## 2021-02-11 DIAGNOSIS — M62.838 MUSCLE SPASMS OF NECK: ICD-10-CM

## 2021-02-11 DIAGNOSIS — M54.9 BACK PAIN, UNSPECIFIED BACK LOCATION, UNSPECIFIED BACK PAIN LATERALITY, UNSPECIFIED CHRONICITY: ICD-10-CM

## 2021-02-11 PROCEDURE — 3008F BODY MASS INDEX DOCD: CPT | Mod: CPTII,S$GLB,, | Performed by: FAMILY MEDICINE

## 2021-02-11 PROCEDURE — 1125F AMNT PAIN NOTED PAIN PRSNT: CPT | Mod: S$GLB,,, | Performed by: FAMILY MEDICINE

## 2021-02-11 PROCEDURE — 99213 OFFICE O/P EST LOW 20 MIN: CPT | Mod: S$GLB,,, | Performed by: FAMILY MEDICINE

## 2021-02-11 PROCEDURE — 3008F PR BODY MASS INDEX (BMI) DOCUMENTED: ICD-10-PCS | Mod: CPTII,S$GLB,, | Performed by: FAMILY MEDICINE

## 2021-02-11 PROCEDURE — 1125F PR PAIN SEVERITY QUANTIFIED, PAIN PRESENT: ICD-10-PCS | Mod: S$GLB,,, | Performed by: FAMILY MEDICINE

## 2021-02-11 PROCEDURE — 99999 PR PBB SHADOW E&M-EST. PATIENT-LVL IV: ICD-10-PCS | Mod: PBBFAC,,, | Performed by: FAMILY MEDICINE

## 2021-02-11 PROCEDURE — 99213 PR OFFICE/OUTPT VISIT, EST, LEVL III, 20-29 MIN: ICD-10-PCS | Mod: S$GLB,,, | Performed by: FAMILY MEDICINE

## 2021-02-11 PROCEDURE — 99999 PR PBB SHADOW E&M-EST. PATIENT-LVL IV: CPT | Mod: PBBFAC,,, | Performed by: FAMILY MEDICINE

## 2021-02-16 ENCOUNTER — PATIENT MESSAGE (OUTPATIENT)
Dept: INTERNAL MEDICINE | Facility: CLINIC | Age: 41
End: 2021-02-16

## 2021-02-19 ENCOUNTER — CLINICAL SUPPORT (OUTPATIENT)
Dept: REHABILITATION | Facility: HOSPITAL | Age: 41
End: 2021-02-19
Payer: COMMERCIAL

## 2021-02-19 ENCOUNTER — DOCUMENTATION ONLY (OUTPATIENT)
Dept: REHABILITATION | Facility: HOSPITAL | Age: 41
End: 2021-02-19

## 2021-02-19 DIAGNOSIS — M62.89 ABNORMAL INCREASED MUSCLE TIGHTNESS: ICD-10-CM

## 2021-02-19 DIAGNOSIS — Z74.09 DECREASED STRENGTH, ENDURANCE, AND MOBILITY: ICD-10-CM

## 2021-02-19 DIAGNOSIS — R68.89 DECREASED STRENGTH, ENDURANCE, AND MOBILITY: ICD-10-CM

## 2021-02-19 DIAGNOSIS — R53.1 DECREASED STRENGTH, ENDURANCE, AND MOBILITY: ICD-10-CM

## 2021-02-19 DIAGNOSIS — R29.3 POOR POSTURE: ICD-10-CM

## 2021-02-19 PROCEDURE — 97110 THERAPEUTIC EXERCISES: CPT | Mod: CQ

## 2021-02-24 ENCOUNTER — PATIENT MESSAGE (OUTPATIENT)
Dept: INTERNAL MEDICINE | Facility: CLINIC | Age: 41
End: 2021-02-24

## 2021-02-25 ENCOUNTER — CLINICAL SUPPORT (OUTPATIENT)
Dept: REHABILITATION | Facility: HOSPITAL | Age: 41
End: 2021-02-25
Payer: COMMERCIAL

## 2021-02-25 DIAGNOSIS — R53.1 DECREASED STRENGTH, ENDURANCE, AND MOBILITY: ICD-10-CM

## 2021-02-25 DIAGNOSIS — Z74.09 DECREASED STRENGTH, ENDURANCE, AND MOBILITY: ICD-10-CM

## 2021-02-25 DIAGNOSIS — R29.3 POOR POSTURE: ICD-10-CM

## 2021-02-25 DIAGNOSIS — M62.89 ABNORMAL INCREASED MUSCLE TIGHTNESS: ICD-10-CM

## 2021-02-25 DIAGNOSIS — R68.89 DECREASED STRENGTH, ENDURANCE, AND MOBILITY: ICD-10-CM

## 2021-02-25 PROCEDURE — 97110 THERAPEUTIC EXERCISES: CPT

## 2021-02-25 PROCEDURE — 97140 MANUAL THERAPY 1/> REGIONS: CPT

## 2021-03-04 ENCOUNTER — PATIENT MESSAGE (OUTPATIENT)
Dept: INTERNAL MEDICINE | Facility: CLINIC | Age: 41
End: 2021-03-04

## 2021-03-04 ENCOUNTER — CLINICAL SUPPORT (OUTPATIENT)
Dept: REHABILITATION | Facility: HOSPITAL | Age: 41
End: 2021-03-04
Payer: COMMERCIAL

## 2021-03-04 DIAGNOSIS — R68.89 DECREASED STRENGTH, ENDURANCE, AND MOBILITY: ICD-10-CM

## 2021-03-04 DIAGNOSIS — M62.89 ABNORMAL INCREASED MUSCLE TIGHTNESS: ICD-10-CM

## 2021-03-04 DIAGNOSIS — R53.1 DECREASED STRENGTH, ENDURANCE, AND MOBILITY: ICD-10-CM

## 2021-03-04 DIAGNOSIS — Z74.09 DECREASED STRENGTH, ENDURANCE, AND MOBILITY: ICD-10-CM

## 2021-03-04 DIAGNOSIS — R29.3 POOR POSTURE: ICD-10-CM

## 2021-03-04 PROCEDURE — 97110 THERAPEUTIC EXERCISES: CPT

## 2021-03-11 ENCOUNTER — CLINICAL SUPPORT (OUTPATIENT)
Dept: REHABILITATION | Facility: HOSPITAL | Age: 41
End: 2021-03-11
Payer: COMMERCIAL

## 2021-03-11 DIAGNOSIS — R68.89 DECREASED STRENGTH, ENDURANCE, AND MOBILITY: ICD-10-CM

## 2021-03-11 DIAGNOSIS — M62.89 ABNORMAL INCREASED MUSCLE TIGHTNESS: ICD-10-CM

## 2021-03-11 DIAGNOSIS — R53.1 DECREASED STRENGTH, ENDURANCE, AND MOBILITY: ICD-10-CM

## 2021-03-11 DIAGNOSIS — R29.3 POOR POSTURE: ICD-10-CM

## 2021-03-11 DIAGNOSIS — Z74.09 DECREASED STRENGTH, ENDURANCE, AND MOBILITY: ICD-10-CM

## 2021-03-11 PROCEDURE — 97110 THERAPEUTIC EXERCISES: CPT | Mod: CQ

## 2021-03-23 ENCOUNTER — PATIENT MESSAGE (OUTPATIENT)
Dept: OBSTETRICS AND GYNECOLOGY | Facility: CLINIC | Age: 41
End: 2021-03-23

## 2021-03-23 ENCOUNTER — PATIENT MESSAGE (OUTPATIENT)
Dept: REHABILITATION | Facility: HOSPITAL | Age: 41
End: 2021-03-23

## 2021-03-23 RX ORDER — FAMCICLOVIR 500 MG/1
500 TABLET ORAL 3 TIMES DAILY
Qty: 21 TABLET | Refills: 0 | Status: SHIPPED | OUTPATIENT
Start: 2021-03-23 | End: 2021-03-30

## 2021-03-24 ENCOUNTER — PATIENT MESSAGE (OUTPATIENT)
Dept: OBSTETRICS AND GYNECOLOGY | Facility: CLINIC | Age: 41
End: 2021-03-24

## 2021-03-31 ENCOUNTER — PATIENT MESSAGE (OUTPATIENT)
Dept: INTERNAL MEDICINE | Facility: CLINIC | Age: 41
End: 2021-03-31

## 2021-03-31 ENCOUNTER — DOCUMENTATION ONLY (OUTPATIENT)
Dept: REHABILITATION | Facility: HOSPITAL | Age: 41
End: 2021-03-31

## 2021-03-31 DIAGNOSIS — Z74.09 DECREASED STRENGTH, ENDURANCE, AND MOBILITY: ICD-10-CM

## 2021-03-31 DIAGNOSIS — R68.89 DECREASED STRENGTH, ENDURANCE, AND MOBILITY: ICD-10-CM

## 2021-03-31 DIAGNOSIS — R53.1 DECREASED STRENGTH, ENDURANCE, AND MOBILITY: ICD-10-CM

## 2021-03-31 DIAGNOSIS — M62.89 ABNORMAL INCREASED MUSCLE TIGHTNESS: Primary | ICD-10-CM

## 2021-03-31 DIAGNOSIS — R29.3 POOR POSTURE: ICD-10-CM

## 2021-04-01 RX ORDER — INDOMETHACIN 75 MG/1
75 CAPSULE, EXTENDED RELEASE ORAL 2 TIMES DAILY WITH MEALS
Qty: 28 CAPSULE | Refills: 0 | Status: SHIPPED | OUTPATIENT
Start: 2021-04-01 | End: 2021-10-21

## 2021-04-28 ENCOUNTER — PATIENT MESSAGE (OUTPATIENT)
Dept: RESEARCH | Facility: HOSPITAL | Age: 41
End: 2021-04-28

## 2021-08-18 ENCOUNTER — HOSPITAL ENCOUNTER (EMERGENCY)
Facility: HOSPITAL | Age: 41
Discharge: HOME | End: 2021-08-18
Attending: EMERGENCY MEDICINE
Payer: MEDICAID

## 2021-08-18 VITALS
DIASTOLIC BLOOD PRESSURE: 90 MMHG | WEIGHT: 275.56 LBS | TEMPERATURE: 100 F | RESPIRATION RATE: 16 BRPM | HEIGHT: 69 IN | BODY MASS INDEX: 40.81 KG/M2 | SYSTOLIC BLOOD PRESSURE: 133 MMHG | OXYGEN SATURATION: 98 % | HEART RATE: 100 BPM

## 2021-08-18 DIAGNOSIS — R05.9 COUGH: ICD-10-CM

## 2021-08-18 DIAGNOSIS — U07.1 COVID-19: Primary | ICD-10-CM

## 2021-08-18 LAB
CTP QC/QA: YES
SARS-COV-2 RDRP RESP QL NAA+PROBE: POSITIVE

## 2021-08-18 PROCEDURE — 99283 EMERGENCY DEPT VISIT LOW MDM: CPT | Mod: 25,ER

## 2021-08-18 PROCEDURE — U0002 COVID-19 LAB TEST NON-CDC: HCPCS | Mod: ER | Performed by: EMERGENCY MEDICINE

## 2021-08-18 PROCEDURE — 25000003 PHARM REV CODE 250: Mod: ER | Performed by: EMERGENCY MEDICINE

## 2021-08-18 RX ORDER — ACETAMINOPHEN 500 MG
1000 TABLET ORAL
Status: COMPLETED | OUTPATIENT
Start: 2021-08-18 | End: 2021-08-18

## 2021-08-18 RX ADMIN — ACETAMINOPHEN 1000 MG: 500 TABLET ORAL at 10:08

## 2021-09-09 ENCOUNTER — PATIENT MESSAGE (OUTPATIENT)
Dept: INTERNAL MEDICINE | Facility: CLINIC | Age: 41
End: 2021-09-09

## 2021-09-09 ENCOUNTER — PATIENT MESSAGE (OUTPATIENT)
Dept: UROLOGY | Facility: CLINIC | Age: 41
End: 2021-09-09

## 2021-09-09 ENCOUNTER — PATIENT MESSAGE (OUTPATIENT)
Dept: OBSTETRICS AND GYNECOLOGY | Facility: CLINIC | Age: 41
End: 2021-09-09

## 2021-09-10 ENCOUNTER — IMMUNIZATION (OUTPATIENT)
Dept: PRIMARY CARE CLINIC | Facility: CLINIC | Age: 41
End: 2021-09-10
Payer: MEDICAID

## 2021-09-10 DIAGNOSIS — Z23 NEED FOR VACCINATION: Primary | ICD-10-CM

## 2021-09-10 DIAGNOSIS — B00.2 ORAL HERPES: Primary | ICD-10-CM

## 2021-09-10 PROCEDURE — 0001A COVID-19, MRNA, LNP-S, PF, 30 MCG/0.3 ML DOSE VACCINE: CPT | Mod: CV19,S$GLB,, | Performed by: FAMILY MEDICINE

## 2021-09-10 PROCEDURE — 91300 COVID-19, MRNA, LNP-S, PF, 30 MCG/0.3 ML DOSE VACCINE: CPT | Mod: S$GLB,,, | Performed by: FAMILY MEDICINE

## 2021-09-10 PROCEDURE — 0001A COVID-19, MRNA, LNP-S, PF, 30 MCG/0.3 ML DOSE VACCINE: ICD-10-PCS | Mod: CV19,S$GLB,, | Performed by: FAMILY MEDICINE

## 2021-09-10 PROCEDURE — 91300 COVID-19, MRNA, LNP-S, PF, 30 MCG/0.3 ML DOSE VACCINE: ICD-10-PCS | Mod: S$GLB,,, | Performed by: FAMILY MEDICINE

## 2021-09-10 RX ORDER — FAMCICLOVIR 500 MG/1
500 TABLET ORAL 3 TIMES DAILY
Qty: 21 TABLET | Refills: 0 | Status: SHIPPED | OUTPATIENT
Start: 2021-09-10 | End: 2021-09-17

## 2021-10-02 ENCOUNTER — IMMUNIZATION (OUTPATIENT)
Dept: PRIMARY CARE CLINIC | Facility: CLINIC | Age: 41
End: 2021-10-02
Payer: MEDICAID

## 2021-10-02 DIAGNOSIS — Z23 NEED FOR VACCINATION: Primary | ICD-10-CM

## 2021-10-02 PROCEDURE — 0002A COVID-19, MRNA, LNP-S, PF, 30 MCG/0.3 ML DOSE VACCINE: CPT | Mod: CV19,PBBFAC | Performed by: FAMILY MEDICINE

## 2021-10-02 PROCEDURE — 91300 COVID-19, MRNA, LNP-S, PF, 30 MCG/0.3 ML DOSE VACCINE: CPT | Mod: PBBFAC | Performed by: FAMILY MEDICINE

## 2021-10-12 ENCOUNTER — TELEPHONE (OUTPATIENT)
Dept: OBSTETRICS AND GYNECOLOGY | Facility: CLINIC | Age: 41
End: 2021-10-12

## 2021-10-20 ENCOUNTER — PATIENT OUTREACH (OUTPATIENT)
Dept: ADMINISTRATIVE | Facility: OTHER | Age: 41
End: 2021-10-20

## 2021-10-21 ENCOUNTER — OFFICE VISIT (OUTPATIENT)
Dept: OBSTETRICS AND GYNECOLOGY | Facility: CLINIC | Age: 41
End: 2021-10-21
Payer: MEDICAID

## 2021-10-21 ENCOUNTER — TELEPHONE (OUTPATIENT)
Dept: INTERNAL MEDICINE | Facility: CLINIC | Age: 41
End: 2021-10-21

## 2021-10-21 VITALS — WEIGHT: 273.38 LBS | HEIGHT: 69 IN | BODY MASS INDEX: 40.49 KG/M2

## 2021-10-21 DIAGNOSIS — R03.0 ELEVATED BLOOD PRESSURE READING: ICD-10-CM

## 2021-10-21 DIAGNOSIS — Z01.419 ROUTINE GYNECOLOGICAL EXAMINATION: Primary | ICD-10-CM

## 2021-10-21 DIAGNOSIS — Z11.3 SCREENING EXAMINATION FOR STD (SEXUALLY TRANSMITTED DISEASE): ICD-10-CM

## 2021-10-21 PROCEDURE — 99999 PR PBB SHADOW E&M-EST. PATIENT-LVL III: ICD-10-PCS | Mod: PBBFAC,,, | Performed by: NURSE PRACTITIONER

## 2021-10-21 PROCEDURE — 99999 PR PBB SHADOW E&M-EST. PATIENT-LVL III: CPT | Mod: PBBFAC,,, | Performed by: NURSE PRACTITIONER

## 2021-10-21 PROCEDURE — 99395 PREV VISIT EST AGE 18-39: CPT | Mod: S$PBB,,, | Performed by: NURSE PRACTITIONER

## 2021-10-21 PROCEDURE — 87481 CANDIDA DNA AMP PROBE: CPT | Mod: 59 | Performed by: NURSE PRACTITIONER

## 2021-10-21 PROCEDURE — 99213 OFFICE O/P EST LOW 20 MIN: CPT | Mod: PBBFAC | Performed by: NURSE PRACTITIONER

## 2021-10-21 PROCEDURE — 99395 PR PREVENTIVE VISIT,EST,18-39: ICD-10-PCS | Mod: S$PBB,,, | Performed by: NURSE PRACTITIONER

## 2021-10-25 ENCOUNTER — PATIENT MESSAGE (OUTPATIENT)
Dept: INTERNAL MEDICINE | Facility: CLINIC | Age: 41
End: 2021-10-25
Payer: MEDICAID

## 2021-10-25 LAB
BACTERIAL VAGINOSIS DNA: POSITIVE
CANDIDA GLABRATA DNA: NEGATIVE
CANDIDA KRUSEI DNA: NEGATIVE
CANDIDA RRNA VAG QL PROBE: NEGATIVE
T VAGINALIS RRNA GENITAL QL PROBE: NEGATIVE

## 2021-10-26 ENCOUNTER — LAB VISIT (OUTPATIENT)
Dept: LAB | Facility: HOSPITAL | Age: 41
End: 2021-10-26
Attending: FAMILY MEDICINE
Payer: MEDICAID

## 2021-10-26 ENCOUNTER — OFFICE VISIT (OUTPATIENT)
Dept: INTERNAL MEDICINE | Facility: CLINIC | Age: 41
End: 2021-10-26
Payer: MEDICAID

## 2021-10-26 VITALS
DIASTOLIC BLOOD PRESSURE: 98 MMHG | WEIGHT: 276.88 LBS | TEMPERATURE: 98 F | SYSTOLIC BLOOD PRESSURE: 142 MMHG | HEIGHT: 69 IN | OXYGEN SATURATION: 99 % | HEART RATE: 86 BPM | BODY MASS INDEX: 41.01 KG/M2

## 2021-10-26 DIAGNOSIS — F17.200 TOBACCO DEPENDENCE: ICD-10-CM

## 2021-10-26 DIAGNOSIS — Z00.00 ROUTINE ADULT HEALTH MAINTENANCE: ICD-10-CM

## 2021-10-26 DIAGNOSIS — B96.89 BACTERIAL VAGINOSIS: Primary | ICD-10-CM

## 2021-10-26 DIAGNOSIS — N76.0 BACTERIAL VAGINOSIS: Primary | ICD-10-CM

## 2021-10-26 DIAGNOSIS — F41.9 ANXIETY AND DEPRESSION: ICD-10-CM

## 2021-10-26 DIAGNOSIS — F32.A ANXIETY AND DEPRESSION: ICD-10-CM

## 2021-10-26 DIAGNOSIS — E88.819 INSULIN RESISTANCE: ICD-10-CM

## 2021-10-26 DIAGNOSIS — Z00.00 ROUTINE ADULT HEALTH MAINTENANCE: Primary | ICD-10-CM

## 2021-10-26 LAB
25(OH)D3+25(OH)D2 SERPL-MCNC: 8 NG/ML (ref 30–96)
ALBUMIN SERPL BCP-MCNC: 3.7 G/DL (ref 3.5–5.2)
ALP SERPL-CCNC: 70 U/L (ref 55–135)
ALT SERPL W/O P-5'-P-CCNC: 14 U/L (ref 10–44)
ANION GAP SERPL CALC-SCNC: 8 MMOL/L (ref 8–16)
AST SERPL-CCNC: 19 U/L (ref 10–40)
BASOPHILS # BLD AUTO: 0.03 K/UL (ref 0–0.2)
BASOPHILS NFR BLD: 0.4 % (ref 0–1.9)
BILIRUB SERPL-MCNC: 0.3 MG/DL (ref 0.1–1)
BUN SERPL-MCNC: 9 MG/DL (ref 6–20)
CALCIUM SERPL-MCNC: 10 MG/DL (ref 8.7–10.5)
CHLORIDE SERPL-SCNC: 104 MMOL/L (ref 95–110)
CHOLEST SERPL-MCNC: 223 MG/DL (ref 120–199)
CHOLEST/HDLC SERPL: 4.6 {RATIO} (ref 2–5)
CO2 SERPL-SCNC: 24 MMOL/L (ref 23–29)
CREAT SERPL-MCNC: 0.8 MG/DL (ref 0.5–1.4)
DIFFERENTIAL METHOD: ABNORMAL
EOSINOPHIL # BLD AUTO: 0.1 K/UL (ref 0–0.5)
EOSINOPHIL NFR BLD: 1.1 % (ref 0–8)
ERYTHROCYTE [DISTWIDTH] IN BLOOD BY AUTOMATED COUNT: 13.4 % (ref 11.5–14.5)
EST. GFR  (AFRICAN AMERICAN): >60 ML/MIN/1.73 M^2
EST. GFR  (NON AFRICAN AMERICAN): >60 ML/MIN/1.73 M^2
ESTIMATED AVG GLUCOSE: 91 MG/DL (ref 68–131)
GLUCOSE SERPL-MCNC: 87 MG/DL (ref 70–110)
HBA1C MFR BLD: 4.8 % (ref 4–5.6)
HCT VFR BLD AUTO: 43.9 % (ref 37–48.5)
HDLC SERPL-MCNC: 48 MG/DL (ref 40–75)
HDLC SERPL: 21.5 % (ref 20–50)
HGB BLD-MCNC: 13.4 G/DL (ref 12–16)
IMM GRANULOCYTES # BLD AUTO: 0.02 K/UL (ref 0–0.04)
IMM GRANULOCYTES NFR BLD AUTO: 0.3 % (ref 0–0.5)
LDLC SERPL CALC-MCNC: 142.4 MG/DL (ref 63–159)
LYMPHOCYTES # BLD AUTO: 3.2 K/UL (ref 1–4.8)
LYMPHOCYTES NFR BLD: 43.5 % (ref 18–48)
MCH RBC QN AUTO: 29.8 PG (ref 27–31)
MCHC RBC AUTO-ENTMCNC: 30.5 G/DL (ref 32–36)
MCV RBC AUTO: 98 FL (ref 82–98)
MONOCYTES # BLD AUTO: 0.5 K/UL (ref 0.3–1)
MONOCYTES NFR BLD: 6.4 % (ref 4–15)
NEUTROPHILS # BLD AUTO: 3.5 K/UL (ref 1.8–7.7)
NEUTROPHILS NFR BLD: 48.3 % (ref 38–73)
NONHDLC SERPL-MCNC: 175 MG/DL
NRBC BLD-RTO: 0 /100 WBC
PLATELET # BLD AUTO: 400 K/UL (ref 150–450)
PMV BLD AUTO: 10 FL (ref 9.2–12.9)
POTASSIUM SERPL-SCNC: 4.8 MMOL/L (ref 3.5–5.1)
PROT SERPL-MCNC: 7.2 G/DL (ref 6–8.4)
RBC # BLD AUTO: 4.49 M/UL (ref 4–5.4)
SODIUM SERPL-SCNC: 136 MMOL/L (ref 136–145)
T4 FREE SERPL-MCNC: 0.82 NG/DL (ref 0.71–1.51)
TRIGL SERPL-MCNC: 163 MG/DL (ref 30–150)
TSH SERPL DL<=0.005 MIU/L-ACNC: 2.39 UIU/ML (ref 0.4–4)
WBC # BLD AUTO: 7.29 K/UL (ref 3.9–12.7)

## 2021-10-26 PROCEDURE — 36415 COLL VENOUS BLD VENIPUNCTURE: CPT | Mod: PO | Performed by: FAMILY MEDICINE

## 2021-10-26 PROCEDURE — 80053 COMPREHEN METABOLIC PANEL: CPT | Performed by: FAMILY MEDICINE

## 2021-10-26 PROCEDURE — 85025 COMPLETE CBC W/AUTO DIFF WBC: CPT | Performed by: FAMILY MEDICINE

## 2021-10-26 PROCEDURE — 84439 ASSAY OF FREE THYROXINE: CPT | Performed by: FAMILY MEDICINE

## 2021-10-26 PROCEDURE — 84443 ASSAY THYROID STIM HORMONE: CPT | Performed by: FAMILY MEDICINE

## 2021-10-26 PROCEDURE — 99999 PR PBB SHADOW E&M-EST. PATIENT-LVL III: CPT | Mod: PBBFAC,,, | Performed by: FAMILY MEDICINE

## 2021-10-26 PROCEDURE — 83036 HEMOGLOBIN GLYCOSYLATED A1C: CPT | Performed by: FAMILY MEDICINE

## 2021-10-26 PROCEDURE — 99214 PR OFFICE/OUTPT VISIT, EST, LEVL IV, 30-39 MIN: ICD-10-PCS | Mod: S$PBB,,, | Performed by: FAMILY MEDICINE

## 2021-10-26 PROCEDURE — 80061 LIPID PANEL: CPT | Performed by: FAMILY MEDICINE

## 2021-10-26 PROCEDURE — 99214 OFFICE O/P EST MOD 30 MIN: CPT | Mod: S$PBB,,, | Performed by: FAMILY MEDICINE

## 2021-10-26 PROCEDURE — 99213 OFFICE O/P EST LOW 20 MIN: CPT | Mod: PBBFAC,PO | Performed by: FAMILY MEDICINE

## 2021-10-26 PROCEDURE — 82306 VITAMIN D 25 HYDROXY: CPT | Performed by: FAMILY MEDICINE

## 2021-10-26 PROCEDURE — 99999 PR PBB SHADOW E&M-EST. PATIENT-LVL III: ICD-10-PCS | Mod: PBBFAC,,, | Performed by: FAMILY MEDICINE

## 2021-10-26 RX ORDER — METRONIDAZOLE 500 MG/1
500 TABLET ORAL EVERY 12 HOURS
Qty: 14 TABLET | Refills: 0 | Status: SHIPPED | OUTPATIENT
Start: 2021-10-26 | End: 2021-10-27 | Stop reason: SDUPTHER

## 2021-10-26 RX ORDER — ESCITALOPRAM OXALATE 10 MG/1
10 TABLET ORAL DAILY
Qty: 30 TABLET | Refills: 5 | Status: SHIPPED | OUTPATIENT
Start: 2021-10-26 | End: 2022-05-05

## 2021-10-26 RX ORDER — TRAZODONE HYDROCHLORIDE 100 MG/1
100 TABLET ORAL NIGHTLY
Qty: 30 TABLET | Refills: 11 | Status: SHIPPED | OUTPATIENT
Start: 2021-10-26 | End: 2022-10-03

## 2021-10-26 RX ORDER — ALPRAZOLAM 0.5 MG/1
0.5 TABLET ORAL 2 TIMES DAILY PRN
Qty: 60 TABLET | Refills: 5 | Status: SHIPPED | OUTPATIENT
Start: 2021-10-26 | End: 2022-10-03 | Stop reason: SDUPTHER

## 2021-10-27 ENCOUNTER — TELEPHONE (OUTPATIENT)
Dept: OBSTETRICS AND GYNECOLOGY | Facility: CLINIC | Age: 41
End: 2021-10-27
Payer: MEDICAID

## 2021-10-27 ENCOUNTER — PATIENT MESSAGE (OUTPATIENT)
Dept: INTERNAL MEDICINE | Facility: CLINIC | Age: 41
End: 2021-10-27
Payer: MEDICAID

## 2021-10-27 ENCOUNTER — PATIENT MESSAGE (OUTPATIENT)
Dept: OBSTETRICS AND GYNECOLOGY | Facility: CLINIC | Age: 41
End: 2021-10-27
Payer: MEDICAID

## 2021-10-27 DIAGNOSIS — N76.0 BACTERIAL VAGINOSIS: ICD-10-CM

## 2021-10-27 DIAGNOSIS — B96.89 BACTERIAL VAGINOSIS: ICD-10-CM

## 2021-10-27 RX ORDER — METRONIDAZOLE 500 MG/1
500 TABLET ORAL EVERY 12 HOURS
Qty: 14 TABLET | Refills: 0 | Status: SHIPPED | OUTPATIENT
Start: 2021-10-27 | End: 2021-11-03

## 2021-10-28 ENCOUNTER — PATIENT MESSAGE (OUTPATIENT)
Dept: INTERNAL MEDICINE | Facility: CLINIC | Age: 41
End: 2021-10-28
Payer: MEDICAID

## 2021-11-09 ENCOUNTER — CLINICAL SUPPORT (OUTPATIENT)
Dept: INTERNAL MEDICINE | Facility: CLINIC | Age: 41
End: 2021-11-09
Payer: MEDICAID

## 2021-11-09 VITALS — SYSTOLIC BLOOD PRESSURE: 139 MMHG | DIASTOLIC BLOOD PRESSURE: 91 MMHG

## 2021-11-09 DIAGNOSIS — Z01.30 BLOOD PRESSURE CHECK: Primary | ICD-10-CM

## 2021-11-09 PROCEDURE — 99211 OFF/OP EST MAY X REQ PHY/QHP: CPT | Mod: PBBFAC,PO

## 2021-11-09 PROCEDURE — 99999 PR PBB SHADOW E&M-EST. PATIENT-LVL I: CPT | Mod: PBBFAC,,,

## 2021-11-09 PROCEDURE — 99999 PR PBB SHADOW E&M-EST. PATIENT-LVL I: ICD-10-PCS | Mod: PBBFAC,,,

## 2021-11-10 ENCOUNTER — TELEPHONE (OUTPATIENT)
Dept: INTERNAL MEDICINE | Facility: CLINIC | Age: 41
End: 2021-11-10
Payer: MEDICAID

## 2021-11-10 ENCOUNTER — PATIENT MESSAGE (OUTPATIENT)
Dept: INTERNAL MEDICINE | Facility: CLINIC | Age: 41
End: 2021-11-10
Payer: MEDICAID

## 2021-11-10 RX ORDER — BUTALBITAL, ACETAMINOPHEN AND CAFFEINE 50; 325; 40 MG/1; MG/1; MG/1
1 TABLET ORAL EVERY 4 HOURS PRN
Qty: 20 TABLET | Refills: 0 | Status: SHIPPED | OUTPATIENT
Start: 2021-11-10 | End: 2021-12-10

## 2022-01-25 ENCOUNTER — PATIENT MESSAGE (OUTPATIENT)
Dept: OBSTETRICS AND GYNECOLOGY | Facility: CLINIC | Age: 42
End: 2022-01-25
Payer: MEDICAID

## 2022-01-25 NOTE — TELEPHONE ENCOUNTER
Please recommend Monistat 7 due to she hs not been here since 10/2021  OR she can call the person that put her on antibiotics and request yeast infection medication   OR she can schedule an appt to be seen

## 2022-02-07 ENCOUNTER — PATIENT OUTREACH (OUTPATIENT)
Dept: ADMINISTRATIVE | Facility: OTHER | Age: 42
End: 2022-02-07
Payer: MEDICAID

## 2022-02-08 ENCOUNTER — OFFICE VISIT (OUTPATIENT)
Dept: OBSTETRICS AND GYNECOLOGY | Facility: CLINIC | Age: 42
End: 2022-02-08
Payer: MEDICAID

## 2022-02-08 ENCOUNTER — OFFICE VISIT (OUTPATIENT)
Dept: INTERNAL MEDICINE | Facility: CLINIC | Age: 42
End: 2022-02-08
Payer: MEDICAID

## 2022-02-08 VITALS
DIASTOLIC BLOOD PRESSURE: 100 MMHG | BODY MASS INDEX: 40.29 KG/M2 | SYSTOLIC BLOOD PRESSURE: 153 MMHG | OXYGEN SATURATION: 100 % | HEART RATE: 79 BPM | HEIGHT: 69 IN | WEIGHT: 272.06 LBS | TEMPERATURE: 98 F

## 2022-02-08 VITALS
SYSTOLIC BLOOD PRESSURE: 146 MMHG | HEIGHT: 69 IN | WEIGHT: 268.94 LBS | DIASTOLIC BLOOD PRESSURE: 100 MMHG | BODY MASS INDEX: 39.83 KG/M2

## 2022-02-08 DIAGNOSIS — Z13.1 SCREENING FOR DIABETES MELLITUS: ICD-10-CM

## 2022-02-08 DIAGNOSIS — Z72.0 SMOKING TRYING TO QUIT: ICD-10-CM

## 2022-02-08 DIAGNOSIS — I10 HYPERTENSION, UNSPECIFIED TYPE: ICD-10-CM

## 2022-02-08 DIAGNOSIS — A60.9 HSV (HERPES SIMPLEX VIRUS) ANOGENITAL INFECTION: Primary | ICD-10-CM

## 2022-02-08 DIAGNOSIS — Z12.4 PAPANICOLAOU SMEAR FOR CERVICAL CANCER SCREENING: ICD-10-CM

## 2022-02-08 DIAGNOSIS — R43.0 LOSS OF SMELL: ICD-10-CM

## 2022-02-08 DIAGNOSIS — I10 HYPERTENSION, UNSPECIFIED TYPE: Primary | ICD-10-CM

## 2022-02-08 PROCEDURE — 1159F PR MEDICATION LIST DOCUMENTED IN MEDICAL RECORD: ICD-10-PCS | Mod: CPTII,,, | Performed by: NURSE PRACTITIONER

## 2022-02-08 PROCEDURE — 3080F PR MOST RECENT DIASTOLIC BLOOD PRESSURE >= 90 MM HG: ICD-10-PCS | Mod: CPTII,,, | Performed by: FAMILY MEDICINE

## 2022-02-08 PROCEDURE — 1159F MED LIST DOCD IN RCRD: CPT | Mod: CPTII,,, | Performed by: NURSE PRACTITIONER

## 2022-02-08 PROCEDURE — 3080F DIAST BP >= 90 MM HG: CPT | Mod: CPTII,,, | Performed by: FAMILY MEDICINE

## 2022-02-08 PROCEDURE — 3077F SYST BP >= 140 MM HG: CPT | Mod: CPTII,,, | Performed by: FAMILY MEDICINE

## 2022-02-08 PROCEDURE — 3008F PR BODY MASS INDEX (BMI) DOCUMENTED: ICD-10-PCS | Mod: CPTII,,, | Performed by: FAMILY MEDICINE

## 2022-02-08 PROCEDURE — 99999 PR PBB SHADOW E&M-EST. PATIENT-LVL III: CPT | Mod: PBBFAC,,, | Performed by: NURSE PRACTITIONER

## 2022-02-08 PROCEDURE — 99213 OFFICE O/P EST LOW 20 MIN: CPT | Mod: S$PBB,25,, | Performed by: NURSE PRACTITIONER

## 2022-02-08 PROCEDURE — 99213 OFFICE O/P EST LOW 20 MIN: CPT | Mod: PBBFAC,27,PO | Performed by: FAMILY MEDICINE

## 2022-02-08 PROCEDURE — 3080F DIAST BP >= 90 MM HG: CPT | Mod: CPTII,,, | Performed by: NURSE PRACTITIONER

## 2022-02-08 PROCEDURE — 99213 OFFICE O/P EST LOW 20 MIN: CPT | Mod: S$PBB,,, | Performed by: FAMILY MEDICINE

## 2022-02-08 PROCEDURE — 3077F PR MOST RECENT SYSTOLIC BLOOD PRESSURE >= 140 MM HG: ICD-10-PCS | Mod: CPTII,,, | Performed by: NURSE PRACTITIONER

## 2022-02-08 PROCEDURE — 99999 PR PBB SHADOW E&M-EST. PATIENT-LVL III: CPT | Mod: PBBFAC,,, | Performed by: FAMILY MEDICINE

## 2022-02-08 PROCEDURE — 99213 OFFICE O/P EST LOW 20 MIN: CPT | Mod: PBBFAC | Performed by: NURSE PRACTITIONER

## 2022-02-08 PROCEDURE — 1160F PR REVIEW ALL MEDS BY PRESCRIBER/CLIN PHARMACIST DOCUMENTED: ICD-10-PCS | Mod: CPTII,,, | Performed by: NURSE PRACTITIONER

## 2022-02-08 PROCEDURE — 3077F PR MOST RECENT SYSTOLIC BLOOD PRESSURE >= 140 MM HG: ICD-10-PCS | Mod: CPTII,,, | Performed by: FAMILY MEDICINE

## 2022-02-08 PROCEDURE — 3080F PR MOST RECENT DIASTOLIC BLOOD PRESSURE >= 90 MM HG: ICD-10-PCS | Mod: CPTII,,, | Performed by: NURSE PRACTITIONER

## 2022-02-08 PROCEDURE — 3008F BODY MASS INDEX DOCD: CPT | Mod: CPTII,,, | Performed by: NURSE PRACTITIONER

## 2022-02-08 PROCEDURE — 3008F BODY MASS INDEX DOCD: CPT | Mod: CPTII,,, | Performed by: FAMILY MEDICINE

## 2022-02-08 PROCEDURE — 1160F RVW MEDS BY RX/DR IN RCRD: CPT | Mod: CPTII,,, | Performed by: NURSE PRACTITIONER

## 2022-02-08 PROCEDURE — 99213 PR OFFICE/OUTPT VISIT, EST, LEVL III, 20-29 MIN: ICD-10-PCS | Mod: S$PBB,25,, | Performed by: NURSE PRACTITIONER

## 2022-02-08 PROCEDURE — 3008F PR BODY MASS INDEX (BMI) DOCUMENTED: ICD-10-PCS | Mod: CPTII,,, | Performed by: NURSE PRACTITIONER

## 2022-02-08 PROCEDURE — 99213 PR OFFICE/OUTPT VISIT, EST, LEVL III, 20-29 MIN: ICD-10-PCS | Mod: S$PBB,,, | Performed by: FAMILY MEDICINE

## 2022-02-08 PROCEDURE — 99999 PR PBB SHADOW E&M-EST. PATIENT-LVL III: ICD-10-PCS | Mod: PBBFAC,,, | Performed by: FAMILY MEDICINE

## 2022-02-08 PROCEDURE — 99999 PR PBB SHADOW E&M-EST. PATIENT-LVL III: ICD-10-PCS | Mod: PBBFAC,,, | Performed by: NURSE PRACTITIONER

## 2022-02-08 PROCEDURE — 3077F SYST BP >= 140 MM HG: CPT | Mod: CPTII,,, | Performed by: NURSE PRACTITIONER

## 2022-02-08 RX ORDER — AMLODIPINE BESYLATE 5 MG/1
5 TABLET ORAL DAILY
Qty: 30 TABLET | Refills: 5 | Status: SHIPPED | OUTPATIENT
Start: 2022-02-08 | End: 2022-02-14

## 2022-02-08 RX ORDER — FAMCICLOVIR 500 MG/1
500 TABLET ORAL 3 TIMES DAILY
Qty: 21 TABLET | Refills: 12 | Status: SHIPPED | OUTPATIENT
Start: 2022-02-08 | End: 2022-02-15

## 2022-02-08 NOTE — PROGRESS NOTES
Subjective:      Patient ID: Karen Rudolph is a 41 y.o. female.    Chief Complaint: Hypertension      Patient here for follow up on blood pressure. She had gone to gynecology visit this morning and blood pressure was very elevated, had been checking at home and also has been elevated there.   Reports also around December had new onset loss of smell and taste - had negative covid test at the time, taste has returned but still has altered smell - keeps smelling something foul    Review of Systems   Constitutional: Negative for activity change and appetite change.   Respiratory: Negative for shortness of breath.    Cardiovascular: Negative for leg swelling.   Gastrointestinal: Negative for abdominal pain.     Past Medical History:   Diagnosis Date    Cholelithiasis     Chronic back pain           Past Surgical History:   Procedure Laterality Date     SECTION      COLPOSCOPY       Family History   Problem Relation Age of Onset    Diabetes Mother     Hypertension Mother      Social History     Socioeconomic History    Marital status: Single   Tobacco Use    Smoking status: Current Every Day Smoker     Types: Cigarettes    Smokeless tobacco: Current User   Substance and Sexual Activity    Alcohol use: Yes     Alcohol/week: 0.0 standard drinks     Comment: weekly     Drug use: No    Sexual activity: Yes     Partners: Male     Birth control/protection: None     Social Determinants of Health     Financial Resource Strain: Medium Risk    Difficulty of Paying Living Expenses: Somewhat hard   Food Insecurity: Food Insecurity Present    Worried About Running Out of Food in the Last Year: Sometimes true    Ran Out of Food in the Last Year: Sometimes true   Transportation Needs: No Transportation Needs    Lack of Transportation (Medical): No    Lack of Transportation (Non-Medical): No   Physical Activity: Insufficiently Active    Days of Exercise per Week: 3 days    Minutes of Exercise per Session: 30  "min   Stress: Stress Concern Present    Feeling of Stress : Very much   Social Connections: Unknown    Frequency of Communication with Friends and Family: More than three times a week    Frequency of Social Gatherings with Friends and Family: Once a week    Active Member of Clubs or Organizations: No    Attends Club or Organization Meetings: Patient refused    Marital Status: Never    Housing Stability: High Risk    Unable to Pay for Housing in the Last Year: Yes    Number of Places Lived in the Last Year: 1    Unstable Housing in the Last Year: No     Review of patient's allergies indicates:  No Known Allergies    Objective:       BP (!) 153/100 (BP Location: Right arm, Patient Position: Sitting, BP Method: Large (Automatic))   Pulse 79   Temp 98.1 °F (36.7 °C) (Tympanic)   Ht 5' 9" (1.753 m)   Wt 123.4 kg (272 lb 0.8 oz)   LMP 01/27/2022   SpO2 100%   BMI 40.17 kg/m²   Physical Exam  Constitutional:       General: She is not in acute distress.     Appearance: Normal appearance. She is well-developed. She is not ill-appearing or diaphoretic.   Cardiovascular:      Rate and Rhythm: Normal rate and regular rhythm.      Heart sounds: Normal heart sounds.   Pulmonary:      Effort: Pulmonary effort is normal.      Breath sounds: Normal breath sounds.   Neurological:      Mental Status: She is alert and oriented to person, place, and time.   Psychiatric:         Mood and Affect: Mood normal.         Behavior: Behavior normal.         Thought Content: Thought content normal.         Judgment: Judgment normal.       Assessment:     1. Hypertension, unspecified type    2. Loss of smell      Plan:   Hypertension, unspecified type    Loss of smell    Other orders  -     amLODIPine (NORVASC) 5 MG tablet; Take 1 tablet (5 mg total) by mouth once daily.  Dispense: 30 tablet; Refill: 5      Medication List with Changes/Refills   New Medications    AMLODIPINE (NORVASC) 5 MG TABLET    Take 1 tablet (5 mg " total) by mouth once daily.   Current Medications    ALPRAZOLAM (XANAX) 0.5 MG TABLET    Take 1 tablet (0.5 mg total) by mouth 2 (two) times daily as needed for Anxiety.    ESCITALOPRAM OXALATE (LEXAPRO) 10 MG TABLET    Take 1 tablet (10 mg total) by mouth once daily.    FAMCICLOVIR (FAMVIR) 500 MG TABLET    Take 1 tablet (500 mg total) by mouth 3 (three) times daily. for 7 days    GABAPENTIN (NEURONTIN) 100 MG CAPSULE    Take 1 capsule (100 mg total) by mouth 3 (three) times daily as needed.    TRAZODONE (DESYREL) 100 MG TABLET    Take 1 tablet (100 mg total) by mouth every evening.

## 2022-02-08 NOTE — PROGRESS NOTES
"    Chief Complaint   Patient presents with    Vaginal Pain        Karen Rudolph is a 41 y.o. female    Presents today for vaginal irritation and itching, took diflucan twice and the itching went away but she is still irritated         Past Medical History:   Diagnosis Date    Cholelithiasis     Chronic back pain      Past Surgical History:   Procedure Laterality Date     SECTION      COLPOSCOPY       Social History     Tobacco Use    Smoking status: Current Every Day Smoker     Types: Cigarettes    Smokeless tobacco: Current User   Substance Use Topics    Alcohol use: Yes     Alcohol/week: 0.0 standard drinks     Comment: weekly     Drug use: No     Family History   Problem Relation Age of Onset    Diabetes Mother     Hypertension Mother      OB History    Para Term  AB Living   1 1 1     1   SAB IAB Ectopic Multiple Live Births           1      # Outcome Date GA Lbr Jose Enrique/2nd Weight Sex Delivery Anes PTL Lv   1 Term      CS-LTranv          Medication List with Changes/Refills   New Medications    AMLODIPINE (NORVASC) 5 MG TABLET    Take 1 tablet (5 mg total) by mouth once daily.    FAMCICLOVIR (FAMVIR) 500 MG TABLET    Take 1 tablet (500 mg total) by mouth 3 (three) times daily. for 7 days   Current Medications    ALPRAZOLAM (XANAX) 0.5 MG TABLET    Take 1 tablet (0.5 mg total) by mouth 2 (two) times daily as needed for Anxiety.    ESCITALOPRAM OXALATE (LEXAPRO) 10 MG TABLET    Take 1 tablet (10 mg total) by mouth once daily.    GABAPENTIN (NEURONTIN) 100 MG CAPSULE    Take 1 capsule (100 mg total) by mouth 3 (three) times daily as needed.    TRAZODONE (DESYREL) 100 MG TABLET    Take 1 tablet (100 mg total) by mouth every evening.      BP (!) 146/100   Ht 5' 9" (1.753 m)   Wt 122 kg (268 lb 15.4 oz)   LMP 2022   BMI 39.72 kg/m²     ROS:  Review of Systems    See HPI   PHYSICAL EXAM:  Physical Exam  Genitourinary:                   ASSESSMENT and PLAN:    ICD-10-CM " ICD-9-CM    1. HSV (herpes simplex virus) anogenital infection  A60.9 054.9 famciclovir (FAMVIR) 500 MG tablet   2. Smoking trying to quit  Z72.0 305.1    3. Screening for diabetes mellitus  Z13.1 V77.1 Hemoglobin A1C   4. Papanicolaou smear for cervical cancer screening  Z12.4 V76.2 CANCELED: Liquid-Based Pap Smear, Screening      CANCELED: HPV High Risk Genotypes, PCR   5. Hypertension, unspecified type  I10 401.9      Trying to quit smoking on her own   Elevated blood pressure today, recommend see PCP for eval and treat  States that she was evaluated by PCP and was told that her blood pressure was fine.  Jazmyne Spencer, NP

## 2022-02-08 NOTE — PROGRESS NOTES
Health Maintenance Due   Topic Date Due    Hepatitis C Screening  Never done    Influenza Vaccine (1) 09/01/2021     Updates were requested from care everywhere.  Chart was reviewed for overdue Proactive [facility] Encounters ([facility]ITA) topics (CRS, Breast Cancer Screening, Eye exam)  Health Maintenance has been updated.  LINKS immunization registry triggered.  Immunizations were reconciled.

## 2022-02-14 ENCOUNTER — PATIENT MESSAGE (OUTPATIENT)
Dept: INTERNAL MEDICINE | Facility: CLINIC | Age: 42
End: 2022-02-14
Payer: MEDICAID

## 2022-02-14 RX ORDER — LISINOPRIL 20 MG/1
20 TABLET ORAL DAILY
Qty: 30 TABLET | Refills: 3 | Status: SHIPPED | OUTPATIENT
Start: 2022-02-14 | End: 2022-05-06

## 2022-02-24 ENCOUNTER — PATIENT MESSAGE (OUTPATIENT)
Dept: RESEARCH | Facility: HOSPITAL | Age: 42
End: 2022-02-24
Payer: MEDICAID

## 2022-03-03 ENCOUNTER — TELEPHONE (OUTPATIENT)
Dept: INTERNAL MEDICINE | Facility: CLINIC | Age: 42
End: 2022-03-03
Payer: MEDICAID

## 2022-03-03 ENCOUNTER — PATIENT OUTREACH (OUTPATIENT)
Dept: ADMINISTRATIVE | Facility: HOSPITAL | Age: 42
End: 2022-03-03
Payer: MEDICAID

## 2022-03-03 ENCOUNTER — PATIENT MESSAGE (OUTPATIENT)
Dept: ADMINISTRATIVE | Facility: HOSPITAL | Age: 42
End: 2022-03-03
Payer: MEDICAID

## 2022-05-05 RX ORDER — ESCITALOPRAM OXALATE 10 MG/1
TABLET ORAL
Qty: 90 TABLET | Refills: 3 | Status: SHIPPED | OUTPATIENT
Start: 2022-05-05 | End: 2022-10-03 | Stop reason: SDUPTHER

## 2022-05-05 NOTE — TELEPHONE ENCOUNTER
Refill Routing Note   Medication(s) are not appropriate for processing by [facility] for the following reason(s):      - Required vitals are abnormal    [facility] action(s):  Defer  Approve  Last BP in normal range within 360 days        BP Readings from Last 3 Encounters:   02/08/22 (!) 153/100   02/08/22 (!) 146/100   11/09/21 (!) 139/91        Medication Therapy Plan: DEFER lisinopril (bp abnormal) APPROVE escitalopram  Medication reconciliation completed: No     Appointments  past 12m or future 3m with PCP    Date Provider   Last Visit   2/8/2022 Naomi Cunha MD   Next Visit   Visit date not found Naomi Cunha MD   ED visits in past 90 days: 0        Note composed:5:31 PM 05/05/2022

## 2022-05-05 NOTE — TELEPHONE ENCOUNTER
No new care gaps identified.  [facility] Embedded Care Gaps. Reference number: [id number]. 5/05/2022   10:10:30 AM CDT

## 2022-05-06 RX ORDER — LISINOPRIL 20 MG/1
TABLET ORAL
Qty: 90 TABLET | Refills: 3 | Status: SHIPPED | OUTPATIENT
Start: 2022-05-06

## 2022-06-07 ENCOUNTER — PATIENT MESSAGE (OUTPATIENT)
Dept: OBSTETRICS AND GYNECOLOGY | Facility: CLINIC | Age: 42
End: 2022-06-07
Payer: MEDICAID

## 2022-06-07 NOTE — TELEPHONE ENCOUNTER
If her cycles are regular (comes around the same time every month) she can schedule an appt with me   If her cycles are irregular she needs to schedule an appt with GYN MD for infertility management

## 2022-06-08 ENCOUNTER — PATIENT MESSAGE (OUTPATIENT)
Dept: INTERNAL MEDICINE | Facility: CLINIC | Age: 42
End: 2022-06-08
Payer: MEDICAID

## 2022-06-23 ENCOUNTER — PATIENT MESSAGE (OUTPATIENT)
Dept: OBSTETRICS AND GYNECOLOGY | Facility: CLINIC | Age: 42
End: 2022-06-23
Payer: MEDICAID

## 2022-06-29 ENCOUNTER — OFFICE VISIT (OUTPATIENT)
Dept: OBSTETRICS AND GYNECOLOGY | Facility: CLINIC | Age: 42
End: 2022-06-29
Payer: MEDICAID

## 2022-06-29 VITALS
SYSTOLIC BLOOD PRESSURE: 130 MMHG | WEIGHT: 259.69 LBS | DIASTOLIC BLOOD PRESSURE: 86 MMHG | BODY MASS INDEX: 38.46 KG/M2 | HEIGHT: 69 IN

## 2022-06-29 DIAGNOSIS — Z31.9 INFERTILITY MANAGEMENT: Primary | ICD-10-CM

## 2022-06-29 DIAGNOSIS — Z72.0 SMOKING TRYING TO QUIT: ICD-10-CM

## 2022-06-29 PROCEDURE — 99999 PR PBB SHADOW E&M-EST. PATIENT-LVL III: CPT | Mod: PBBFAC,,, | Performed by: NURSE PRACTITIONER

## 2022-06-29 PROCEDURE — 3075F SYST BP GE 130 - 139MM HG: CPT | Mod: CPTII,,, | Performed by: NURSE PRACTITIONER

## 2022-06-29 PROCEDURE — 99213 PR OFFICE/OUTPT VISIT, EST, LEVL III, 20-29 MIN: ICD-10-PCS | Mod: S$PBB,,, | Performed by: NURSE PRACTITIONER

## 2022-06-29 PROCEDURE — 3008F BODY MASS INDEX DOCD: CPT | Mod: CPTII,,, | Performed by: NURSE PRACTITIONER

## 2022-06-29 PROCEDURE — 3075F PR MOST RECENT SYSTOLIC BLOOD PRESS GE 130-139MM HG: ICD-10-PCS | Mod: CPTII,,, | Performed by: NURSE PRACTITIONER

## 2022-06-29 PROCEDURE — 99213 OFFICE O/P EST LOW 20 MIN: CPT | Mod: PBBFAC | Performed by: NURSE PRACTITIONER

## 2022-06-29 PROCEDURE — 3008F PR BODY MASS INDEX (BMI) DOCUMENTED: ICD-10-PCS | Mod: CPTII,,, | Performed by: NURSE PRACTITIONER

## 2022-06-29 PROCEDURE — 1160F PR REVIEW ALL MEDS BY PRESCRIBER/CLIN PHARMACIST DOCUMENTED: ICD-10-PCS | Mod: CPTII,,, | Performed by: NURSE PRACTITIONER

## 2022-06-29 PROCEDURE — 1159F PR MEDICATION LIST DOCUMENTED IN MEDICAL RECORD: ICD-10-PCS | Mod: CPTII,,, | Performed by: NURSE PRACTITIONER

## 2022-06-29 PROCEDURE — 3079F DIAST BP 80-89 MM HG: CPT | Mod: CPTII,,, | Performed by: NURSE PRACTITIONER

## 2022-06-29 PROCEDURE — 4010F ACE/ARB THERAPY RXD/TAKEN: CPT | Mod: CPTII,,, | Performed by: NURSE PRACTITIONER

## 2022-06-29 PROCEDURE — 87491 CHLMYD TRACH DNA AMP PROBE: CPT | Performed by: NURSE PRACTITIONER

## 2022-06-29 PROCEDURE — 1160F RVW MEDS BY RX/DR IN RCRD: CPT | Mod: CPTII,,, | Performed by: NURSE PRACTITIONER

## 2022-06-29 PROCEDURE — 4010F PR ACE/ARB THEARPY RXD/TAKEN: ICD-10-PCS | Mod: CPTII,,, | Performed by: NURSE PRACTITIONER

## 2022-06-29 PROCEDURE — 99213 OFFICE O/P EST LOW 20 MIN: CPT | Mod: S$PBB,,, | Performed by: NURSE PRACTITIONER

## 2022-06-29 PROCEDURE — 99999 PR PBB SHADOW E&M-EST. PATIENT-LVL III: ICD-10-PCS | Mod: PBBFAC,,, | Performed by: NURSE PRACTITIONER

## 2022-06-29 PROCEDURE — 87591 N.GONORRHOEAE DNA AMP PROB: CPT | Performed by: NURSE PRACTITIONER

## 2022-06-29 PROCEDURE — 1159F MED LIST DOCD IN RCRD: CPT | Mod: CPTII,,, | Performed by: NURSE PRACTITIONER

## 2022-06-29 PROCEDURE — 3079F PR MOST RECENT DIASTOLIC BLOOD PRESSURE 80-89 MM HG: ICD-10-PCS | Mod: CPTII,,, | Performed by: NURSE PRACTITIONER

## 2022-06-29 RX ORDER — FAMCICLOVIR 500 MG/1
500 TABLET ORAL 3 TIMES DAILY
COMMUNITY
Start: 2022-06-24

## 2022-06-29 NOTE — PROGRESS NOTES
"    Chief Complaint   Patient presents with    Infertility        Karen Rudolph is a 41 y.o. female    Presents today for infertility management   Client has been trying to conceive over 12 months   She has one child   Partner has three children   She has Mychart     Past Medical History:   Diagnosis Date    Cholelithiasis     Chronic back pain      Past Surgical History:   Procedure Laterality Date     SECTION      COLPOSCOPY       Social History     Tobacco Use    Smoking status: Current Every Day Smoker     Types: Cigarettes    Smokeless tobacco: Current User   Substance Use Topics    Alcohol use: Yes     Alcohol/week: 0.0 standard drinks     Comment: weekly     Drug use: No     Family History   Problem Relation Age of Onset    Diabetes Mother     Hypertension Mother      OB History    Para Term  AB Living   1 1 1     1   SAB IAB Ectopic Multiple Live Births           1      # Outcome Date GA Lbr Jose Enrique/2nd Weight Sex Delivery Anes PTL Lv   1 Term      CS-LTranv          Medication List with Changes/Refills   Current Medications    ALPRAZOLAM (XANAX) 0.5 MG TABLET    Take 1 tablet (0.5 mg total) by mouth 2 (two) times daily as needed for Anxiety.    ESCITALOPRAM OXALATE (LEXAPRO) 10 MG TABLET    TAKE 1 TABLET BY MOUTH EVERY DAY    FAMCICLOVIR (FAMVIR) 500 MG TABLET    Take 500 mg by mouth 3 (three) times daily.    GABAPENTIN (NEURONTIN) 100 MG CAPSULE    Take 1 capsule (100 mg total) by mouth 3 (three) times daily as needed.    LISINOPRIL (PRINIVIL,ZESTRIL) 20 MG TABLET    TAKE 1 TABLET BY MOUTH EVERY DAY    TRAZODONE (DESYREL) 100 MG TABLET    Take 1 tablet (100 mg total) by mouth every evening.      /86   Ht 5' 9" (1.753 m)   Wt 117.8 kg (259 lb 11.2 oz)   LMP 2022   BMI 38.35 kg/m²     ROS:  Review of Systems      PHYSICAL EXAM:  OBGyn Exam        ASSESSMENT and PLAN:    ICD-10-CM ICD-9-CM    1. Infertility management  Z31.9 V26.9 C. trachomatis/N. " gonorrhoeae by AMP DNA [facility]; Urine      POCT Urine Pregnancy   2. Smoking trying to quit  Z72.0 305.1      Verbalizes understanding to contact me on the first day of her cycle, so that day 3-5 and day 21-25 labs may be drawn. After second set of labs have been drawn will refer to GYN MD for infertility management    Jazmyne Spencer NP

## 2022-06-30 LAB
C TRACH DNA SPEC QL NAA+PROBE: NOT DETECTED
N GONORRHOEA DNA SPEC QL NAA+PROBE: NOT DETECTED

## 2022-07-07 ENCOUNTER — PATIENT MESSAGE (OUTPATIENT)
Dept: OBSTETRICS AND GYNECOLOGY | Facility: CLINIC | Age: 42
End: 2022-07-07
Payer: MEDICAID

## 2022-07-07 DIAGNOSIS — Z31.9 INFERTILITY MANAGEMENT: Primary | ICD-10-CM

## 2022-07-09 ENCOUNTER — LAB VISIT (OUTPATIENT)
Dept: LAB | Facility: HOSPITAL | Age: 42
End: 2022-07-09
Attending: NURSE PRACTITIONER
Payer: MEDICAID

## 2022-07-09 DIAGNOSIS — Z31.9 INFERTILITY MANAGEMENT: ICD-10-CM

## 2022-07-09 LAB — FSH SERPL-ACNC: 6.08 MIU/ML

## 2022-07-09 PROCEDURE — 83001 ASSAY OF GONADOTROPIN (FSH): CPT | Performed by: NURSE PRACTITIONER

## 2022-07-09 PROCEDURE — 36415 COLL VENOUS BLD VENIPUNCTURE: CPT | Performed by: NURSE PRACTITIONER

## 2022-07-12 DIAGNOSIS — Z31.9 INFERTILITY MANAGEMENT: Primary | ICD-10-CM

## 2022-07-26 ENCOUNTER — NURSE TRIAGE (OUTPATIENT)
Dept: ADMINISTRATIVE | Facility: CLINIC | Age: 42
End: 2022-07-26
Payer: MEDICAID

## 2022-07-26 NOTE — TELEPHONE ENCOUNTER
Headache, body aches, runny nose and congestion that began yesterday. Positive at home Covid test today.   Advice, per protocol. Verbalizes understanding.    Reason for Disposition   MILD difficulty breathing (e.g., minimal/no SOB at rest, SOB with walking, pulse <100)   COVID-19 Home Isolation, questions about    Additional Information   Negative: SEVERE difficulty breathing (e.g., struggling for each breath, speaks in single words)   Negative: Difficult to awaken or acting confused (e.g., disoriented, slurred speech)   Negative: Bluish (or gray) lips or face now   Negative: Shock suspected (e.g., cold/pale/clammy skin, too weak to stand, low BP, rapid pulse)   Negative: Sounds like a life-threatening emergency to the triager   Negative: SEVERE or constant chest pain or pressure  (Exception: Mild central chest pain, present only when coughing.)   Negative: MODERATE difficulty breathing (e.g., speaks in phrases, SOB even at rest, pulse 100-120)   Negative: [1] Headache AND [2] stiff neck (can't touch chin to chest)   Negative: Oxygen level (e.g., pulse oximetry) 90 percent or lower   Negative: Chest pain or pressure   Negative: Patient sounds very sick or weak to the triager    Protocols used: CORONAVIRUS (COVID-19) DIAGNOSED OR [id number]

## 2022-07-27 ENCOUNTER — PATIENT MESSAGE (OUTPATIENT)
Dept: INTERNAL MEDICINE | Facility: CLINIC | Age: 42
End: 2022-07-27
Payer: MEDICAID

## 2022-07-27 NOTE — TELEPHONE ENCOUNTER
Patient tested positive for covid yesterday using a home test , congestion runny nose , body aches , no fever   Please advise

## 2022-07-30 ENCOUNTER — PATIENT MESSAGE (OUTPATIENT)
Dept: OBSTETRICS AND GYNECOLOGY | Facility: CLINIC | Age: 42
End: 2022-07-30
Payer: MEDICAID

## 2022-08-01 DIAGNOSIS — Z31.9 INFERTILITY MANAGEMENT: Primary | ICD-10-CM

## 2022-08-04 DIAGNOSIS — Z12.31 OTHER SCREENING MAMMOGRAM: ICD-10-CM

## 2022-09-30 ENCOUNTER — TELEPHONE (OUTPATIENT)
Dept: INTERNAL MEDICINE | Facility: CLINIC | Age: 42
End: 2022-09-30
Payer: MEDICAID

## 2022-09-30 NOTE — TELEPHONE ENCOUNTER
Patient was calling for a sooner appt for med refill.  I heard patient crying over the phone and asked if she was ok.  Patient stated she was not feeling well. She is feeling depressed, just not herself, acting out, and feeling down about her relationship or whatever you want to call it. She called a psychiatrist hoping to talk to  someone but no one called back. I instructed the patient to go the ER to be evaluated since she was feeling that way.  So she can get treatment and to possibly be seen be a psych doctor on call.  Patient stated she will go as soon as she get her son from school and drop him off to someone.

## 2022-09-30 NOTE — TELEPHONE ENCOUNTER
----- Message from Antoinette Vasquez sent at 9/30/2022 12:22 PM CDT -----  Contact: [phone]  No blue slot available to schedule an appointment for the patient.  Patient is established with which PCP: Alphonse  Reason for the visit: med refill  Would the patient like a call back, or a response through their [facility] portal?:  call back

## 2022-10-03 ENCOUNTER — OFFICE VISIT (OUTPATIENT)
Dept: INTERNAL MEDICINE | Facility: CLINIC | Age: 42
End: 2022-10-03
Payer: MEDICAID

## 2022-10-03 VITALS
HEART RATE: 92 BPM | HEIGHT: 69 IN | SYSTOLIC BLOOD PRESSURE: 138 MMHG | WEIGHT: 262.81 LBS | OXYGEN SATURATION: 100 % | TEMPERATURE: 98 F | DIASTOLIC BLOOD PRESSURE: 89 MMHG | BODY MASS INDEX: 38.93 KG/M2

## 2022-10-03 DIAGNOSIS — F32.A DEPRESSION, UNSPECIFIED DEPRESSION TYPE: Primary | ICD-10-CM

## 2022-10-03 PROCEDURE — 3008F PR BODY MASS INDEX (BMI) DOCUMENTED: ICD-10-PCS | Mod: CPTII,,, | Performed by: FAMILY MEDICINE

## 2022-10-03 PROCEDURE — 99213 PR OFFICE/OUTPT VISIT, EST, LEVL III, 20-29 MIN: ICD-10-PCS | Mod: S$PBB,,, | Performed by: FAMILY MEDICINE

## 2022-10-03 PROCEDURE — 99999 PR PBB SHADOW E&M-EST. PATIENT-LVL IV: CPT | Mod: PBBFAC,,, | Performed by: FAMILY MEDICINE

## 2022-10-03 PROCEDURE — 3079F PR MOST RECENT DIASTOLIC BLOOD PRESSURE 80-89 MM HG: ICD-10-PCS | Mod: CPTII,,, | Performed by: FAMILY MEDICINE

## 2022-10-03 PROCEDURE — 4010F PR ACE/ARB THEARPY RXD/TAKEN: ICD-10-PCS | Mod: CPTII,,, | Performed by: FAMILY MEDICINE

## 2022-10-03 PROCEDURE — 99214 OFFICE O/P EST MOD 30 MIN: CPT | Mod: PBBFAC,PO | Performed by: FAMILY MEDICINE

## 2022-10-03 PROCEDURE — 4010F ACE/ARB THERAPY RXD/TAKEN: CPT | Mod: CPTII,,, | Performed by: FAMILY MEDICINE

## 2022-10-03 PROCEDURE — 1159F MED LIST DOCD IN RCRD: CPT | Mod: CPTII,,, | Performed by: FAMILY MEDICINE

## 2022-10-03 PROCEDURE — 99999 PR PBB SHADOW E&M-EST. PATIENT-LVL IV: ICD-10-PCS | Mod: PBBFAC,,, | Performed by: FAMILY MEDICINE

## 2022-10-03 PROCEDURE — 3075F PR MOST RECENT SYSTOLIC BLOOD PRESS GE 130-139MM HG: ICD-10-PCS | Mod: CPTII,,, | Performed by: FAMILY MEDICINE

## 2022-10-03 PROCEDURE — 1159F PR MEDICATION LIST DOCUMENTED IN MEDICAL RECORD: ICD-10-PCS | Mod: CPTII,,, | Performed by: FAMILY MEDICINE

## 2022-10-03 PROCEDURE — 3075F SYST BP GE 130 - 139MM HG: CPT | Mod: CPTII,,, | Performed by: FAMILY MEDICINE

## 2022-10-03 PROCEDURE — 99213 OFFICE O/P EST LOW 20 MIN: CPT | Mod: S$PBB,,, | Performed by: FAMILY MEDICINE

## 2022-10-03 PROCEDURE — 3008F BODY MASS INDEX DOCD: CPT | Mod: CPTII,,, | Performed by: FAMILY MEDICINE

## 2022-10-03 PROCEDURE — 3079F DIAST BP 80-89 MM HG: CPT | Mod: CPTII,,, | Performed by: FAMILY MEDICINE

## 2022-10-03 RX ORDER — ESCITALOPRAM OXALATE 20 MG/1
20 TABLET ORAL DAILY
Qty: 30 TABLET | Refills: 6 | Status: SHIPPED | OUTPATIENT
Start: 2022-10-03

## 2022-10-03 RX ORDER — AMITRIPTYLINE HYDROCHLORIDE 50 MG/1
50 TABLET, FILM COATED ORAL NIGHTLY
Qty: 30 TABLET | Refills: 11 | Status: SHIPPED | OUTPATIENT
Start: 2022-10-03 | End: 2023-10-03

## 2022-10-03 RX ORDER — ALPRAZOLAM 0.5 MG/1
0.5 TABLET ORAL 2 TIMES DAILY PRN
Qty: 60 TABLET | Refills: 1 | Status: SHIPPED | OUTPATIENT
Start: 2022-10-03 | End: 2023-01-12

## 2022-10-03 NOTE — PROGRESS NOTES
Subjective:      Patient ID: Karen Rudolph is a 42 y.o. female.    Chief Complaint: Medication Refill      Patient reports worsened depression, crying frequently, feeling overwhelmed, not wanting to get out of bed and do anything.  Had stopped taking medication for several days this week secondary to depression.  Reports multiple increased stressors at home right now.    Medication Refill    Review of Systems   Psychiatric/Behavioral:  Positive for dysphoric mood and sleep disturbance. Negative for hallucinations, self-injury and suicidal ideas. The patient is not nervous/anxious.    Past Medical History:   Diagnosis Date    Cholelithiasis     Chronic back pain           Past Surgical History:   Procedure Laterality Date     SECTION      COLPOSCOPY       Family History   Problem Relation Age of Onset    Diabetes Mother     Hypertension Mother      Social History     Socioeconomic History    Marital status: Single   Tobacco Use    Smoking status: Every Day     Types: Cigarettes    Smokeless tobacco: Current   Substance and Sexual Activity    Alcohol use: Yes     Alcohol/week: 0.0 standard drinks     Comment: weekly     Drug use: No    Sexual activity: Yes     Partners: Male     Birth control/protection: None     Social Determinants of Health     Financial Resource Strain: Low Risk     Difficulty of Paying Living Expenses: Not very hard   Food Insecurity: No Food Insecurity    Worried About Running Out of Food in the Last Year: Never true    Ran Out of Food in the Last Year: Never true   Transportation Needs: No Transportation Needs    Lack of Transportation (Medical): No    Lack of Transportation (Non-Medical): No   Physical Activity: Insufficiently Active    Days of Exercise per Week: 2 days    Minutes of Exercise per Session: 30 min   Stress: Stress Concern Present    Feeling of Stress : Very much   Social Connections: Unknown    Frequency of Communication with Friends and Family: More than three times a  "week    Frequency of Social Gatherings with Friends and Family: Patient refused    Active Member of Clubs or Organizations: No    Attends Club or Organization Meetings: Never    Marital Status: Living with partner   Housing Stability: Low Risk     Unable to Pay for Housing in the Last Year: No    Number of Places Lived in the Last Year: 1    Unstable Housing in the Last Year: No     Review of patient's allergies indicates:  No Known Allergies    Objective:       /89   Pulse 92   Temp 97.6 °F (36.4 °C)   Ht 5' 9" (1.753 m)   Wt 119.2 kg (262 lb 12.6 oz)   SpO2 100%   BMI 38.81 kg/m²   Physical Exam  Constitutional:       General: She is not in acute distress.     Appearance: Normal appearance. She is well-developed. She is not ill-appearing or diaphoretic.   Cardiovascular:      Rate and Rhythm: Normal rate and regular rhythm.      Heart sounds: Normal heart sounds.   Pulmonary:      Effort: Pulmonary effort is normal.      Breath sounds: Normal breath sounds.   Neurological:      Mental Status: She is alert and oriented to person, place, and time.   Psychiatric:         Mood and Affect: Mood is depressed. Affect is tearful.         Speech: Speech normal.         Behavior: Behavior normal. Behavior is not agitated or aggressive. Behavior is cooperative.         Thought Content: Thought content normal. Thought content is not paranoid or delusional. Thought content does not include homicidal or suicidal ideation.         Judgment: Judgment normal.     Assessment:     1. Depression, unspecified depression type      Plan:   Depression, unspecified depression type  -     Ambulatory referral/consult to Psychiatry; Future; Expected date: 10/10/2022    Other orders  -     EScitalopram oxalate (LEXAPRO) 20 MG tablet; Take 1 tablet (20 mg total) by mouth once daily.  Dispense: 30 tablet; Refill: 6  -     ALPRAZolam (XANAX) 0.5 MG tablet; Take 1 tablet (0.5 mg total) by mouth 2 (two) times daily as needed for " Anxiety.  Dispense: 60 tablet; Refill: 1  -     amitriptyline (ELAVIL) 50 MG tablet; Take 1 tablet (50 mg total) by mouth every evening.  Dispense: 30 tablet; Refill: 11    Follow up with me 2 weeks  Medication List with Changes/Refills   New Medications    AMITRIPTYLINE (ELAVIL) 50 MG TABLET    Take 1 tablet (50 mg total) by mouth every evening.   Current Medications    FAMCICLOVIR (FAMVIR) 500 MG TABLET    Take 500 mg by mouth 3 (three) times daily.    GABAPENTIN (NEURONTIN) 100 MG CAPSULE    Take 1 capsule (100 mg total) by mouth 3 (three) times daily as needed.    LISINOPRIL (PRINIVIL,ZESTRIL) 20 MG TABLET    TAKE 1 TABLET BY MOUTH EVERY DAY   Changed and/or Refilled Medications    Modified Medication Previous Medication    ALPRAZOLAM (XANAX) 0.5 MG TABLET ALPRAZolam (XANAX) 0.5 MG tablet       Take 1 tablet (0.5 mg total) by mouth 2 (two) times daily as needed for Anxiety.    Take 1 tablet (0.5 mg total) by mouth 2 (two) times daily as needed for Anxiety.    ESCITALOPRAM OXALATE (LEXAPRO) 20 MG TABLET EScitalopram oxalate (LEXAPRO) 10 MG tablet       Take 1 tablet (20 mg total) by mouth once daily.    TAKE 1 TABLET BY MOUTH EVERY DAY   Discontinued Medications    TRAZODONE (DESYREL) 100 MG TABLET    Take 1 tablet (100 mg total) by mouth every evening.

## 2022-10-03 NOTE — PATIENT INSTRUCTIONS
I put in your referral to psychiatry, please call the department at [phone] to schedule your appointment.

## 2022-10-18 ENCOUNTER — OFFICE VISIT (OUTPATIENT)
Dept: INTERNAL MEDICINE | Facility: CLINIC | Age: 42
End: 2022-10-18
Payer: MEDICAID

## 2022-10-18 VITALS
OXYGEN SATURATION: 98 % | HEART RATE: 77 BPM | HEIGHT: 69 IN | BODY MASS INDEX: 39.25 KG/M2 | DIASTOLIC BLOOD PRESSURE: 84 MMHG | TEMPERATURE: 97 F | WEIGHT: 265 LBS | SYSTOLIC BLOOD PRESSURE: 110 MMHG

## 2022-10-18 DIAGNOSIS — Z00.00 ROUTINE ADULT HEALTH MAINTENANCE: ICD-10-CM

## 2022-10-18 DIAGNOSIS — F32.A DEPRESSION, UNSPECIFIED DEPRESSION TYPE: Primary | ICD-10-CM

## 2022-10-18 PROCEDURE — 3079F DIAST BP 80-89 MM HG: CPT | Mod: CPTII,,, | Performed by: FAMILY MEDICINE

## 2022-10-18 PROCEDURE — 99213 OFFICE O/P EST LOW 20 MIN: CPT | Mod: S$PBB,,, | Performed by: FAMILY MEDICINE

## 2022-10-18 PROCEDURE — 3074F PR MOST RECENT SYSTOLIC BLOOD PRESSURE < 130 MM HG: ICD-10-PCS | Mod: CPTII,,, | Performed by: FAMILY MEDICINE

## 2022-10-18 PROCEDURE — 1159F MED LIST DOCD IN RCRD: CPT | Mod: CPTII,,, | Performed by: FAMILY MEDICINE

## 2022-10-18 PROCEDURE — 99213 OFFICE O/P EST LOW 20 MIN: CPT | Mod: PBBFAC,PO | Performed by: FAMILY MEDICINE

## 2022-10-18 PROCEDURE — 3079F PR MOST RECENT DIASTOLIC BLOOD PRESSURE 80-89 MM HG: ICD-10-PCS | Mod: CPTII,,, | Performed by: FAMILY MEDICINE

## 2022-10-18 PROCEDURE — 3074F SYST BP LT 130 MM HG: CPT | Mod: CPTII,,, | Performed by: FAMILY MEDICINE

## 2022-10-18 PROCEDURE — 1159F PR MEDICATION LIST DOCUMENTED IN MEDICAL RECORD: ICD-10-PCS | Mod: CPTII,,, | Performed by: FAMILY MEDICINE

## 2022-10-18 PROCEDURE — 99213 PR OFFICE/OUTPT VISIT, EST, LEVL III, 20-29 MIN: ICD-10-PCS | Mod: S$PBB,,, | Performed by: FAMILY MEDICINE

## 2022-10-18 PROCEDURE — 99999 PR PBB SHADOW E&M-EST. PATIENT-LVL III: ICD-10-PCS | Mod: PBBFAC,,, | Performed by: FAMILY MEDICINE

## 2022-10-18 PROCEDURE — 99999 PR PBB SHADOW E&M-EST. PATIENT-LVL III: CPT | Mod: PBBFAC,,, | Performed by: FAMILY MEDICINE

## 2022-10-18 PROCEDURE — 4010F ACE/ARB THERAPY RXD/TAKEN: CPT | Mod: CPTII,,, | Performed by: FAMILY MEDICINE

## 2022-10-18 PROCEDURE — 4010F PR ACE/ARB THEARPY RXD/TAKEN: ICD-10-PCS | Mod: CPTII,,, | Performed by: FAMILY MEDICINE

## 2022-10-20 NOTE — PROGRESS NOTES
Subjective:      Patient ID: Karen Rudolph is a 42 y.o. female.    Chief Complaint: Follow-up and Depression      Patient here for follow-up on depression-has been on Lexapro 20 mg for about 3 weeks now,.  Reports symptoms much improved, mood is calmer, doing well.  Also due for routine labs.    Follow-up    DepressionPatient is not experiencing: nervousness/anxiety and palpitations.      Review of Systems   Constitutional:  Negative for unexpected weight change.   Cardiovascular:  Negative for palpitations.   Psychiatric/Behavioral:  Positive for depression. Negative for dysphoric mood and sleep disturbance. The patient is not nervous/anxious.    Past Medical History:   Diagnosis Date    Cholelithiasis     Chronic back pain     Depression           Past Surgical History:   Procedure Laterality Date     SECTION      COLPOSCOPY       Family History   Problem Relation Age of Onset    Diabetes Mother     Hypertension Mother      Social History     Socioeconomic History    Marital status: Single   Tobacco Use    Smoking status: Every Day     Types: Cigarettes    Smokeless tobacco: Current   Substance and Sexual Activity    Alcohol use: Yes     Alcohol/week: 0.0 standard drinks     Comment: weekly     Drug use: No    Sexual activity: Yes     Partners: Male     Birth control/protection: None     Social Determinants of Health     Financial Resource Strain: Low Risk     Difficulty of Paying Living Expenses: Not hard at all   Food Insecurity: No Food Insecurity    Worried About Running Out of Food in the Last Year: Never true    Ran Out of Food in the Last Year: Never true   Transportation Needs: No Transportation Needs    Lack of Transportation (Medical): No    Lack of Transportation (Non-Medical): No   Physical Activity: Insufficiently Active    Days of Exercise per Week: 3 days    Minutes of Exercise per Session: 30 min   Stress: Stress Concern Present    Feeling of Stress : Very much   Social Connections:  "Unknown    Frequency of Communication with Friends and Family: More than three times a week    Frequency of Social Gatherings with Friends and Family: Never    Active Member of Clubs or Organizations: No    Attends Club or Organization Meetings: Never    Marital Status: Living with partner   Housing Stability: High Risk    Unable to Pay for Housing in the Last Year: Yes    Number of Places Lived in the Last Year: 1    Unstable Housing in the Last Year: No     Review of patient's allergies indicates:  No Known Allergies    Objective:       /84 (BP Location: Left arm, Patient Position: Sitting, BP Method: Large (Manual))   Pulse 77   Temp 97 °F (36.1 °C) (Temporal)   Ht 5' 9" (1.753 m)   Wt 120.2 kg (264 lb 15.9 oz)   SpO2 98%   BMI 39.13 kg/m²   Physical Exam  Vitals reviewed.   Constitutional:       General: She is not in acute distress.     Appearance: Normal appearance. She is well-developed. She is not ill-appearing or diaphoretic.   HENT:      Head: Normocephalic and atraumatic.      Right Ear: Hearing, tympanic membrane, ear canal and external ear normal.      Left Ear: Hearing, tympanic membrane, ear canal and external ear normal.      Nose: Nose normal.      Mouth/Throat:      Pharynx: Uvula midline. No oropharyngeal exudate.   Eyes:      Conjunctiva/sclera: Conjunctivae normal.      Pupils: Pupils are equal, round, and reactive to light.   Neck:      Thyroid: No thyromegaly.      Trachea: No tracheal deviation.   Cardiovascular:      Rate and Rhythm: Normal rate and regular rhythm.      Heart sounds: Normal heart sounds. No murmur heard.  Pulmonary:      Effort: Pulmonary effort is normal. No respiratory distress.      Breath sounds: Normal breath sounds.   Abdominal:      General: Bowel sounds are normal.      Palpations: Abdomen is soft.      Tenderness: There is no abdominal tenderness. There is no guarding.      Hernia: No hernia is present.   Musculoskeletal:         General: Normal range of " motion.      Cervical back: Normal range of motion and neck supple.   Lymphadenopathy:      Cervical: No cervical adenopathy.   Skin:     General: Skin is warm and dry.      Capillary Refill: Capillary refill takes less than 2 seconds.   Neurological:      General: No focal deficit present.      Mental Status: She is alert and oriented to person, place, and time.   Psychiatric:         Mood and Affect: Mood normal.         Behavior: Behavior normal.         Thought Content: Thought content normal.         Judgment: Judgment normal.     Assessment:     1. Depression, unspecified depression type    2. Routine adult health maintenance      Plan:   Depression, unspecified depression type    Routine adult health maintenance  -     CBC Auto Differential; Future; Expected date: 10/18/2022  -     Comprehensive Metabolic Panel; Future; Expected date: 10/18/2022  -     Hemoglobin A1C; Future; Expected date: 10/18/2022  -     Lipid Panel; Future; Expected date: 10/18/2022  -     T4, Free; Future; Expected date: 01/18/2023  -     TSH; Future; Expected date: 10/18/2022  -     Vitamin D; Future; Expected date: 10/18/2022    Will return for above labs when fasting  Medication List with Changes/Refills   Current Medications    ALPRAZOLAM (XANAX) 0.5 MG TABLET    Take 1 tablet (0.5 mg total) by mouth 2 (two) times daily as needed for Anxiety.    AMITRIPTYLINE (ELAVIL) 50 MG TABLET    Take 1 tablet (50 mg total) by mouth every evening.    ESCITALOPRAM OXALATE (LEXAPRO) 20 MG TABLET    Take 1 tablet (20 mg total) by mouth once daily.    FAMCICLOVIR (FAMVIR) 500 MG TABLET    Take 500 mg by mouth 3 (three) times daily.    GABAPENTIN (NEURONTIN) 100 MG CAPSULE    Take 1 capsule (100 mg total) by mouth 3 (three) times daily as needed.    LISINOPRIL (PRINIVIL,ZESTRIL) 20 MG TABLET    TAKE 1 TABLET BY MOUTH EVERY DAY

## 2022-10-24 ENCOUNTER — PATIENT MESSAGE (OUTPATIENT)
Dept: INTERNAL MEDICINE | Facility: CLINIC | Age: 42
End: 2022-10-24
Payer: MEDICAID

## 2022-12-04 ENCOUNTER — OFFICE VISIT (OUTPATIENT)
Dept: URGENT CARE | Facility: CLINIC | Age: 42
End: 2022-12-04
Payer: MEDICAID

## 2022-12-04 VITALS
TEMPERATURE: 98 F | WEIGHT: 265 LBS | HEIGHT: 69 IN | SYSTOLIC BLOOD PRESSURE: 139 MMHG | RESPIRATION RATE: 18 BRPM | HEART RATE: 87 BPM | BODY MASS INDEX: 39.25 KG/M2 | DIASTOLIC BLOOD PRESSURE: 88 MMHG | OXYGEN SATURATION: 100 %

## 2022-12-04 DIAGNOSIS — J02.0 STREP PHARYNGITIS: Primary | ICD-10-CM

## 2022-12-04 DIAGNOSIS — T36.95XA ANTIBIOTIC-INDUCED YEAST INFECTION: ICD-10-CM

## 2022-12-04 DIAGNOSIS — B37.9 ANTIBIOTIC-INDUCED YEAST INFECTION: ICD-10-CM

## 2022-12-04 DIAGNOSIS — J02.9 SORE THROAT: ICD-10-CM

## 2022-12-04 LAB
CTP QC/QA: YES
MOLECULAR STREP A: POSITIVE

## 2022-12-04 PROCEDURE — 99213 PR OFFICE/OUTPT VISIT, EST, LEVL III, 20-29 MIN: ICD-10-PCS | Mod: S$GLB,,,

## 2022-12-04 PROCEDURE — 3008F PR BODY MASS INDEX (BMI) DOCUMENTED: ICD-10-PCS | Mod: CPTII,S$GLB,,

## 2022-12-04 PROCEDURE — 1159F PR MEDICATION LIST DOCUMENTED IN MEDICAL RECORD: ICD-10-PCS | Mod: CPTII,S$GLB,,

## 2022-12-04 PROCEDURE — 3008F BODY MASS INDEX DOCD: CPT | Mod: CPTII,S$GLB,,

## 2022-12-04 PROCEDURE — 4010F PR ACE/ARB THEARPY RXD/TAKEN: ICD-10-PCS | Mod: CPTII,S$GLB,,

## 2022-12-04 PROCEDURE — 87651 POCT STREP A MOLECULAR: ICD-10-PCS | Mod: QW,S$GLB,,

## 2022-12-04 PROCEDURE — 3075F SYST BP GE 130 - 139MM HG: CPT | Mod: CPTII,S$GLB,,

## 2022-12-04 PROCEDURE — 4010F ACE/ARB THERAPY RXD/TAKEN: CPT | Mod: CPTII,S$GLB,,

## 2022-12-04 PROCEDURE — 1159F MED LIST DOCD IN RCRD: CPT | Mod: CPTII,S$GLB,,

## 2022-12-04 PROCEDURE — 1160F RVW MEDS BY RX/DR IN RCRD: CPT | Mod: CPTII,S$GLB,,

## 2022-12-04 PROCEDURE — 99213 OFFICE O/P EST LOW 20 MIN: CPT | Mod: S$GLB,,,

## 2022-12-04 PROCEDURE — 1160F PR REVIEW ALL MEDS BY PRESCRIBER/CLIN PHARMACIST DOCUMENTED: ICD-10-PCS | Mod: CPTII,S$GLB,,

## 2022-12-04 PROCEDURE — 3075F PR MOST RECENT SYSTOLIC BLOOD PRESS GE 130-139MM HG: ICD-10-PCS | Mod: CPTII,S$GLB,,

## 2022-12-04 PROCEDURE — 3079F PR MOST RECENT DIASTOLIC BLOOD PRESSURE 80-89 MM HG: ICD-10-PCS | Mod: CPTII,S$GLB,,

## 2022-12-04 PROCEDURE — 3079F DIAST BP 80-89 MM HG: CPT | Mod: CPTII,S$GLB,,

## 2022-12-04 PROCEDURE — 87651 STREP A DNA AMP PROBE: CPT | Mod: QW,S$GLB,,

## 2022-12-04 RX ORDER — PREDNISONE 10 MG/1
20 TABLET ORAL DAILY
Qty: 6 TABLET | Refills: 0 | Status: SHIPPED | OUTPATIENT
Start: 2022-12-04 | End: 2022-12-07

## 2022-12-04 RX ORDER — AMOXICILLIN 500 MG/1
500 TABLET, FILM COATED ORAL 2 TIMES DAILY
Qty: 20 TABLET | Refills: 0 | Status: SHIPPED | OUTPATIENT
Start: 2022-12-04 | End: 2022-12-14

## 2022-12-04 RX ORDER — FLUCONAZOLE 150 MG/1
150 TABLET ORAL DAILY
Qty: 2 TABLET | Refills: 0 | Status: SHIPPED | OUTPATIENT
Start: 2022-12-04 | End: 2022-12-06

## 2022-12-04 NOTE — PATIENT INSTRUCTIONS
You received a steroid prescription/shot today - Please be aware of potential side effects of steroids including elevating blood pressure, increased blood glucose levels, redness to the face, increased risk of opportunistic infections, peptic ulcer disease and GI bleeding, insomnia, tremors. If you received a shot you may also notice dimpling of the skin where the shot goes in.   Do not use steroids more than 3 times per year.   If you have diabetes, please check you blood sugar frequently.  If you have high blood pressure, please check your blood pressure frequently.                                                           Pharyngitis   If your condition worsens or fails to improve we recommend that you receive another evaluation at the ER immediately or contact your PCP to discuss your concerns or return here. You must understand that you've received an urgent care treatment only and that you may be released before all your medical problems are known or treated. You the patient will arrange for followup care as instructed.   The majority of all sore throats or tonsillitis are viral and antibiotics will not treat this.     If the strep test performed in office was Positive:  - Complete the full course of antibiotics given  - Drink plenty of cool liquids while avoid any beverage or food that can irritate your throat (acidic, spicy or salty foods).  - Throw away your toothbrush now and when you complete your antibiotics.      Tylenol or ibuprofen for pain may help as long as you are not allergic to these meds or have a medical condition such as stomach ulcers, liver or kidney disease or taking blood thinners etc that would   prevent you from using these medications.    Rest and fluids will help as well.   If you were prescribed antibiotics and are female and on BCP use additional methods to prevent pregnancy while on the antibiotics and for one cycle after.

## 2022-12-04 NOTE — PROGRESS NOTES
"Subjective:       Patient ID: Karen Rudolph is a 42 y.o. female.    Vitals:  height is 5' 9" (1.753 m) and weight is 120.2 kg (265 lb). Her oral temperature is 98.4 °F (36.9 °C). Her blood pressure is 139/88 and her pulse is 87. Her respiration is 18 and oxygen saturation is 100%.     Chief Complaint: Sore Throat    Patient presents with sore throat since Thursday. Has a hx of strep and states this feels similar. Pain is moderate in severity. Not controlled with old amoxicillin prescription (took 1.5 pills of 800mg rx) or tylenol/ibuprofen    Sore Throat   This is a new problem. The current episode started in the past 7 days. The problem has been gradually worsening. The pain is worse on the right side. There has been no fever. The pain is at a severity of 8/10. The pain is moderate. Associated symptoms include headaches, swollen glands and trouble swallowing. Treatments tried: amoxi 800 from previous. The treatment provided no relief.     Constitution: Positive for chills.   HENT:  Positive for sore throat and trouble swallowing.    Neurological:  Positive for headaches.     Objective:      Physical Exam   Constitutional: She is oriented to person, place, and time. She appears well-developed. She is cooperative.  Non-toxic appearance. She does not appear ill. No distress.   HENT:   Head: Normocephalic and atraumatic.   Ears:   Right Ear: Hearing, tympanic membrane, external ear and ear canal normal.   Left Ear: Hearing, tympanic membrane, external ear and ear canal normal.   Nose: Nose normal. No mucosal edema, rhinorrhea or nasal deformity. No epistaxis. Right sinus exhibits no maxillary sinus tenderness and no frontal sinus tenderness. Left sinus exhibits no maxillary sinus tenderness and no frontal sinus tenderness.   Mouth/Throat: Uvula is midline and mucous membranes are normal. No trismus in the jaw. Normal dentition. No uvula swelling. Posterior oropharyngeal erythema present. No oropharyngeal exudate, " posterior oropharyngeal edema or tonsillar abscesses. Tonsils are 2+ on the right. Tonsils are 2+ on the left. Tonsillar exudate.   Eyes: Conjunctivae and lids are normal. No scleral icterus.   Neck: Trachea normal and phonation normal. Neck supple. No edema present. No erythema present. No neck rigidity present.   Cardiovascular: Normal rate, regular rhythm, normal heart sounds and normal pulses.   Pulmonary/Chest: Effort normal and breath sounds normal. No respiratory distress. She has no decreased breath sounds. She has no rhonchi.   Abdominal: Normal appearance.   Musculoskeletal: Normal range of motion.         General: No deformity. Normal range of motion.   Neurological: She is alert and oriented to person, place, and time. She exhibits normal muscle tone. Coordination normal.   Skin: Skin is warm, dry, intact, not diaphoretic and not pale.   Psychiatric: Her speech is normal and behavior is normal. Judgment and thought content normal.   Nursing note and vitals reviewed.      Results for orders placed or performed in visit on 12/04/22   POCT Strep A, Molecular   Result Value Ref Range    Molecular Strep A, POC Positive (A) Negative     Acceptable Yes        Assessment:       1. Strep pharyngitis    2. Sore throat    3. Antibiotic-induced yeast infection            Plan:         Labs reviewed with patient. RTC and ED precautions discussed.   Discussed proper use and side effects of prescribed and OTC medications recommended for symptomatic relief.       Strep pharyngitis  -     amoxicillin (AMOXIL) 500 MG Tab; Take 1 tablet (500 mg total) by mouth 2 (two) times daily. for 10 days  Dispense: 20 tablet; Refill: 0  -     predniSONE (DELTASONE) 10 MG tablet; Take 2 tablets (20 mg total) by mouth once daily. for 3 days  Dispense: 6 tablet; Refill: 0    Sore throat  -     POCT Strep A, Molecular    Antibiotic-induced yeast infection  -     fluconazole (DIFLUCAN) 150 MG Tab; Take 1 tablet (150 mg  total) by mouth once daily. Please take one dose at the start of your symptoms. If you are still having symptoms in 72 hours (3 days) take second dose. for 2 doses  Dispense: 2 tablet; Refill: 0

## 2023-01-12 RX ORDER — ALPRAZOLAM 0.5 MG/1
TABLET ORAL
Qty: 60 TABLET | Refills: 1 | Status: SHIPPED | OUTPATIENT
Start: 2023-01-12

## 2023-01-12 NOTE — TELEPHONE ENCOUNTER
Care Due:                  Date            Visit Type   Department     Provider  --------------------------------------------------------------------------------                                EP -                              PRIMARY      [facility] INTERNAL  Last Visit: 10-      CARE ([facility])   MEDICINE       Naomi Cunha  Next Visit: None Scheduled  None         None Found                                                            Last  Test          Frequency    Reason                     Performed    Due Date  --------------------------------------------------------------------------------    CMP.........  12 months..  lisinopriL...............  10-   10-    [facility] Embedded Care Gaps. Reference number: [id number]. 1/12/2023   9:09:46 AM CST

## 2023-01-25 ENCOUNTER — PATIENT MESSAGE (OUTPATIENT)
Dept: ADMINISTRATIVE | Facility: HOSPITAL | Age: 43
End: 2023-01-25
Payer: MEDICAID

## 2023-03-28 ENCOUNTER — TELEPHONE (OUTPATIENT)
Dept: OBSTETRICS AND GYNECOLOGY | Facility: CLINIC | Age: 43
End: 2023-03-28
Payer: MEDICAID

## 2023-03-28 NOTE — TELEPHONE ENCOUNTER
----- Message from Radha Smith sent at 3/28/2023  8:03 AM CDT -----  Patient states she needs to speak with someone in regards to a painful menstral, pain last about 3 days cycle is only 4 days, level 10 in pain. And about 16-20 ibuprofen a day during the coarse of her cycle,Please call back at [phone].Thanks

## 2023-10-03 ENCOUNTER — LAB VISIT (OUTPATIENT)
Dept: LAB | Facility: HOSPITAL | Age: 43
End: 2023-10-03
Attending: FAMILY MEDICINE
Payer: MEDICAID

## 2023-10-03 DIAGNOSIS — Z00.00 ROUTINE ADULT HEALTH MAINTENANCE: ICD-10-CM

## 2023-10-03 LAB
ALBUMIN SERPL BCP-MCNC: 3.6 G/DL (ref 3.5–5.2)
ALP SERPL-CCNC: 66 U/L (ref 55–135)
ALT SERPL W/O P-5'-P-CCNC: 40 U/L (ref 10–44)
ANION GAP SERPL CALC-SCNC: 7 MMOL/L (ref 8–16)
AST SERPL-CCNC: 38 U/L (ref 10–40)
BILIRUB SERPL-MCNC: 0.3 MG/DL (ref 0.1–1)
BUN SERPL-MCNC: 7 MG/DL (ref 6–20)
CALCIUM SERPL-MCNC: 8.8 MG/DL (ref 8.7–10.5)
CHLORIDE SERPL-SCNC: 108 MMOL/L (ref 95–110)
CHOLEST SERPL-MCNC: 213 MG/DL (ref 120–199)
CHOLEST/HDLC SERPL: 4 {RATIO} (ref 2–5)
CO2 SERPL-SCNC: 22 MMOL/L (ref 23–29)
CREAT SERPL-MCNC: 0.7 MG/DL (ref 0.5–1.4)
EST. GFR  (NO RACE VARIABLE): >60 ML/MIN/1.73 M^2
ESTIMATED AVG GLUCOSE: 97 MG/DL (ref 68–131)
GLUCOSE SERPL-MCNC: 78 MG/DL (ref 70–110)
HBA1C MFR BLD: 5 % (ref 4–5.6)
HDLC SERPL-MCNC: 53 MG/DL (ref 40–75)
HDLC SERPL: 24.9 % (ref 20–50)
LDLC SERPL CALC-MCNC: 129.2 MG/DL (ref 63–159)
NONHDLC SERPL-MCNC: 160 MG/DL
POTASSIUM SERPL-SCNC: 4.2 MMOL/L (ref 3.5–5.1)
PROT SERPL-MCNC: 7.2 G/DL (ref 6–8.4)
SODIUM SERPL-SCNC: 137 MMOL/L (ref 136–145)
TRIGL SERPL-MCNC: 154 MG/DL (ref 30–150)

## 2023-10-03 PROCEDURE — 80061 LIPID PANEL: CPT | Performed by: FAMILY MEDICINE

## 2023-10-03 PROCEDURE — 83036 HEMOGLOBIN GLYCOSYLATED A1C: CPT | Performed by: FAMILY MEDICINE

## 2023-10-03 PROCEDURE — 84439 ASSAY OF FREE THYROXINE: CPT | Performed by: FAMILY MEDICINE

## 2023-10-03 PROCEDURE — 85025 COMPLETE CBC W/AUTO DIFF WBC: CPT | Performed by: FAMILY MEDICINE

## 2023-10-03 PROCEDURE — 84443 ASSAY THYROID STIM HORMONE: CPT | Performed by: FAMILY MEDICINE

## 2023-10-03 PROCEDURE — 36415 COLL VENOUS BLD VENIPUNCTURE: CPT | Performed by: FAMILY MEDICINE

## 2023-10-03 PROCEDURE — 80053 COMPREHEN METABOLIC PANEL: CPT | Performed by: FAMILY MEDICINE

## 2023-10-03 PROCEDURE — 82306 VITAMIN D 25 HYDROXY: CPT | Performed by: FAMILY MEDICINE

## 2023-10-04 LAB
25(OH)D3+25(OH)D2 SERPL-MCNC: 9 NG/ML (ref 30–96)
BASOPHILS # BLD AUTO: 0.04 K/UL (ref 0–0.2)
BASOPHILS NFR BLD: 0.7 % (ref 0–1.9)
DIFFERENTIAL METHOD: NORMAL
EOSINOPHIL # BLD AUTO: 0.1 K/UL (ref 0–0.5)
EOSINOPHIL NFR BLD: 1.9 % (ref 0–8)
ERYTHROCYTE [DISTWIDTH] IN BLOOD BY AUTOMATED COUNT: 13.6 % (ref 11.5–14.5)
HCT VFR BLD AUTO: 38.1 % (ref 37–48.5)
HGB BLD-MCNC: 12.2 G/DL (ref 12–16)
IMM GRANULOCYTES # BLD AUTO: 0.01 K/UL (ref 0–0.04)
IMM GRANULOCYTES NFR BLD AUTO: 0.2 % (ref 0–0.5)
LYMPHOCYTES # BLD AUTO: 2.2 K/UL (ref 1–4.8)
LYMPHOCYTES NFR BLD: 36.6 % (ref 18–48)
MCH RBC QN AUTO: 29.7 PG (ref 27–31)
MCHC RBC AUTO-ENTMCNC: 32 G/DL (ref 32–36)
MCV RBC AUTO: 93 FL (ref 82–98)
MONOCYTES # BLD AUTO: 0.7 K/UL (ref 0.3–1)
MONOCYTES NFR BLD: 11.2 % (ref 4–15)
NEUTROPHILS # BLD AUTO: 2.9 K/UL (ref 1.8–7.7)
NEUTROPHILS NFR BLD: 49.4 % (ref 38–73)
NRBC BLD-RTO: 0 /100 WBC
PLATELET # BLD AUTO: 310 K/UL (ref 150–450)
PMV BLD AUTO: 9.6 FL (ref 9.2–12.9)
RBC # BLD AUTO: 4.11 M/UL (ref 4–5.4)
T4 FREE SERPL-MCNC: 0.77 NG/DL (ref 0.71–1.51)
TSH SERPL DL<=0.005 MIU/L-ACNC: 1.77 UIU/ML (ref 0.4–4)
WBC # BLD AUTO: 5.87 K/UL (ref 3.9–12.7)

## 2023-10-05 ENCOUNTER — TELEPHONE (OUTPATIENT)
Dept: INTERNAL MEDICINE | Facility: CLINIC | Age: 43
End: 2023-10-05
Payer: MEDICAID

## 2023-10-05 ENCOUNTER — OFFICE VISIT (OUTPATIENT)
Dept: URGENT CARE | Facility: CLINIC | Age: 43
End: 2023-10-05
Payer: MEDICAID

## 2023-10-05 VITALS
WEIGHT: 251 LBS | DIASTOLIC BLOOD PRESSURE: 99 MMHG | SYSTOLIC BLOOD PRESSURE: 147 MMHG | HEIGHT: 68 IN | OXYGEN SATURATION: 100 % | BODY MASS INDEX: 38.04 KG/M2 | TEMPERATURE: 98 F | RESPIRATION RATE: 16 BRPM | HEART RATE: 71 BPM

## 2023-10-05 DIAGNOSIS — U07.1 COVID-19 VIRUS DETECTED: ICD-10-CM

## 2023-10-05 DIAGNOSIS — U07.1 COVID-19: Primary | ICD-10-CM

## 2023-10-05 DIAGNOSIS — J06.9 UPPER RESPIRATORY TRACT INFECTION, UNSPECIFIED TYPE: ICD-10-CM

## 2023-10-05 LAB
CTP QC/QA: YES
SARS-COV-2 AG RESP QL IA.RAPID: POSITIVE

## 2023-10-05 PROCEDURE — 99214 PR OFFICE/OUTPT VISIT, EST, LEVL IV, 30-39 MIN: ICD-10-PCS | Mod: S$GLB,,, | Performed by: NURSE PRACTITIONER

## 2023-10-05 PROCEDURE — 99214 OFFICE O/P EST MOD 30 MIN: CPT | Mod: S$GLB,,, | Performed by: NURSE PRACTITIONER

## 2023-10-05 PROCEDURE — 87811 SARS CORONAVIRUS 2 ANTIGEN POCT, MANUAL READ: ICD-10-PCS | Mod: QW,S$GLB,, | Performed by: NURSE PRACTITIONER

## 2023-10-05 PROCEDURE — 87811 SARS-COV-2 COVID19 W/OPTIC: CPT | Mod: QW,S$GLB,, | Performed by: NURSE PRACTITIONER

## 2023-10-05 RX ORDER — PROMETHAZINE HYDROCHLORIDE AND DEXTROMETHORPHAN HYDROBROMIDE 6.25; 15 MG/5ML; MG/5ML
5 SYRUP ORAL EVERY 6 HOURS PRN
Qty: 180 ML | Refills: 0 | Status: SHIPPED | OUTPATIENT
Start: 2023-10-05

## 2023-10-05 RX ORDER — AZELASTINE 1 MG/ML
1 SPRAY, METERED NASAL 2 TIMES DAILY
Qty: 30 ML | Refills: 2 | Status: SHIPPED | OUTPATIENT
Start: 2023-10-05

## 2023-10-05 NOTE — LETTER
October 5, 2023      [facility]  [address], [address]  [facility] [address]  Phone: [phone]  Fax: [phone]       Patient: Karen Rudolph   YOB: 1980  Date of Visit: 10/05/2023    To Whom It May Concern:    Valentín Rudolph  was at [facility] on 10/05/2023. The patient may return to work/school on 10/10/2023  with restrictions. If you have any questions or concerns, or if I can be of further assistance, please do not hesitate to contact me.    Sincerely,          Tiana Trivedi NP

## 2023-10-05 NOTE — PROGRESS NOTES
"Subjective:      Patient ID: Karen Rudolph is a 43 y.o. female.    Vitals:  height is 5' 7.91" (1.725 m) and weight is 113.9 kg (250 lb 15.9 oz). Her tympanic temperature is 98.1 °F (36.7 °C). Her blood pressure is 147/99 (abnormal) and her pulse is 71. Her respiration is 16 and oxygen saturation is 100%.     Chief Complaint: Sinus Problem    Patient presents with headache, congestion, sinus pressure.  Symptoms started 1.5 weeks ago.  Patient has taken Tylenol Cold and Flu and Excedrin with no relief.    Sinus Problem  This is a new problem. The current episode started 1 to 4 weeks ago. The problem has been gradually worsening since onset. There has been no fever. Associated symptoms include congestion, headaches, sinus pressure and sneezing. Pertinent negatives include no coughing, ear pain, neck pain, shortness of breath or sore throat. Past treatments include acetaminophen. The treatment provided no relief.       HENT:  Positive for congestion and sinus pressure. Negative for ear pain and sore throat.    Neck: Negative for neck pain.   Respiratory:  Negative for cough and shortness of breath.    Allergic/Immunologic: Positive for sneezing.   Neurological:  Positive for headaches.      Objective:     Vitals:    10/05/23 1530   BP: (!) 147/99   BP Location: Right arm   Patient Position: Sitting   BP Method: Large (Automatic)   Pulse: 71   Resp: 16   Temp: 98.1 °F (36.7 °C)   TempSrc: Tympanic   SpO2: 100%   Weight: 113.9 kg (250 lb 15.9 oz)   Height: 5' 7.91" (1.725 m)         Physical Exam   Constitutional: She is oriented to person, place, and time. She appears well-developed. She is cooperative.  Non-toxic appearance. She does not appear ill. No distress.   HENT:   Head: Normocephalic and atraumatic.   Ears:   Right Ear: Hearing, tympanic membrane, external ear and ear canal normal.   Left Ear: Hearing, tympanic membrane, external ear and ear canal normal.   Nose: Congestion present. No mucosal edema, " rhinorrhea or nasal deformity. No epistaxis. Right sinus exhibits no maxillary sinus tenderness and no frontal sinus tenderness. Left sinus exhibits no maxillary sinus tenderness and no frontal sinus tenderness.   Mouth/Throat: Uvula is midline, oropharynx is clear and moist and mucous membranes are normal. Mucous membranes are moist. No trismus in the jaw. Normal dentition. No uvula swelling. No oropharyngeal exudate, posterior oropharyngeal edema or posterior oropharyngeal erythema.   Eyes: Conjunctivae and lids are normal. Pupils are equal, round, and reactive to light. No scleral icterus. Extraocular movement intact   Neck: Trachea normal and phonation normal. Neck supple. No edema present. No erythema present. No neck rigidity present.   Cardiovascular: Normal rate, regular rhythm, normal heart sounds and normal pulses.   Pulmonary/Chest: Effort normal and breath sounds normal. No respiratory distress. She has no decreased breath sounds. She has no rhonchi.   Abdominal: Normal appearance.   Musculoskeletal: Normal range of motion.         General: No deformity. Normal range of motion.   Neurological: She is alert and oriented to person, place, and time. She exhibits normal muscle tone. Coordination normal.   Skin: Skin is warm, dry, intact, not diaphoretic and not pale.   Psychiatric: Her speech is normal and behavior is normal. Judgment and thought content normal.   Nursing note and vitals reviewed.      Assessment:     1. COVID-19    2. Upper respiratory tract infection, unspecified type      Results for orders placed or performed in visit on 10/05/23   SARS Coronavirus 2 Antigen, POCT Manual Read   Result Value Ref Range    SARS Coronavirus 2 Antigen Positive (A) Negative     Acceptable Yes        Plan:   Patient stable for discharge and home management of condition      COVID-19  -     promethazine-dextromethorphan (PROMETHAZINE-DM) 6.25-15 mg/5 mL Syrp; Take 5 mLs by mouth every 6 (six)  hours as needed (cough/congestion).  Dispense: 180 mL; Refill: 0    Upper respiratory tract infection, unspecified type  -     SARS Coronavirus 2 Antigen, POCT Manual Read  -     azelastine (ASTELIN) 137 mcg (0.1 %) nasal spray; 1 spray (137 mcg total) by Nasal route 2 (two) times daily.  Dispense: 30 mL; Refill: 2  -     promethazine-dextromethorphan (PROMETHAZINE-DM) 6.25-15 mg/5 mL Syrp; Take 5 mLs by mouth every 6 (six) hours as needed (cough/congestion).  Dispense: 180 mL; Refill: 0        Patient Instructions   Increase fluids Get plenty of rest.   General infection control measures--cover mouth with sneezing coughing, wash hands frequently   Rest activity ad jimmie   Tylenol per package directions for fever body aches   Supportive care measures   Warm salt water gargles for throat comfort        Instructions for Patients with Confirmed or Suspected COVID-19    If you are awaiting your test result, you will either be called or it will be released to the patient portal.  If you have any questions about your test, please visit [email or web address] or call our COVID-19 information line at [phone].      Please isolate yourself at home.  You may leave home and/or return to work once the following conditions are met:    If you have symptoms and tested positive:  More than 5 days since symptoms first appeared AND  More than 24 hours fever free without medications AND       symptoms have improved   For five days after ending isolation, masks are required.    If you had no symptoms but tested positive:  More than 5 days since the date of the first positive test. If you develop symptoms, then use the guidelines above  For five days after ending isolation, masks are required.      Testing is not recommended if you are symptom free after completing isolation.Instructions for Patients with Confirmed or Suspected COVID-19    If you are awaiting your test result, you will either be called or it will be released to  the patient portal.  If you have any questions about your test, please visit [email or web address] or call our COVID-19 information line at [phone].      Please isolate yourself at home.  You may leave home and/or return to work once the following conditions are met:    If you have symptoms and tested positive:  More than 5 days since symptoms first appeared AND  More than 24 hours fever free without medications AND       symptoms have improved   For five days after ending isolation, masks are required.    If you had no symptoms but tested positive:  More than 5 days since the date of the first positive test. If you develop symptoms, then use the guidelines above  For five days after ending isolation, masks are required.      Testing is not recommended if you are symptom free after completing isolation.

## 2023-10-05 NOTE — PATIENT INSTRUCTIONS
Increase fluids Get plenty of rest.   General infection control measures--cover mouth with sneezing coughing, wash hands frequently   Rest activity ad jimmie   Tylenol per package directions for fever body aches   Supportive care measures   Warm salt water gargles for throat comfort        Instructions for Patients with Confirmed or Suspected COVID-19    If you are awaiting your test result, you will either be called or it will be released to the patient portal.  If you have any questions about your test, please visit [email or web address] or call our COVID-19 information line at [phone].      Please isolate yourself at home.  You may leave home and/or return to work once the following conditions are met:    If you have symptoms and tested positive:  More than 5 days since symptoms first appeared AND  More than 24 hours fever free without medications AND       symptoms have improved   For five days after ending isolation, masks are required.    If you had no symptoms but tested positive:  More than 5 days since the date of the first positive test. If you develop symptoms, then use the guidelines above  For five days after ending isolation, masks are required.      Testing is not recommended if you are symptom free after completing isolation.Instructions for Patients with Confirmed or Suspected COVID-19    If you are awaiting your test result, you will either be called or it will be released to the patient portal.  If you have any questions about your test, please visit [email or web address] or call our COVID-19 information line at [phone].      Please isolate yourself at home.  You may leave home and/or return to work once the following conditions are met:    If you have symptoms and tested positive:  More than 5 days since symptoms first appeared AND  More than 24 hours fever free without medications AND       symptoms have improved   For five days after ending isolation, masks are  required.    If you had no symptoms but tested positive:  More than 5 days since the date of the first positive test. If you develop symptoms, then use the guidelines above  For five days after ending isolation, masks are required.      Testing is not recommended if you are symptom free after completing isolation.

## 2023-10-06 ENCOUNTER — PATIENT MESSAGE (OUTPATIENT)
Dept: INTERNAL MEDICINE | Facility: CLINIC | Age: 43
End: 2023-10-06
Payer: MEDICAID

## 2023-10-08 ENCOUNTER — TELEPHONE (OUTPATIENT)
Dept: URGENT CARE | Facility: CLINIC | Age: 43
End: 2023-10-08
Payer: MEDICAID

## 2023-11-07 ENCOUNTER — TELEPHONE (OUTPATIENT)
Dept: INTERNAL MEDICINE | Facility: CLINIC | Age: 43
End: 2023-11-07
Payer: MEDICAID

## 2023-11-09 ENCOUNTER — PATIENT OUTREACH (OUTPATIENT)
Dept: ADMINISTRATIVE | Facility: HOSPITAL | Age: 43
End: 2023-11-09
Payer: MEDICAID

## 2023-11-09 NOTE — PROGRESS NOTES
Working HTN Report.  Pt last seen Oct 2022.    [facility]- no recent results found.  [facility]-no results found.  Care everywhere- no recent results found.    LM requesting an updated BP reading.

## 2023-11-20 ENCOUNTER — PATIENT MESSAGE (OUTPATIENT)
Dept: INTERNAL MEDICINE | Facility: CLINIC | Age: 43
End: 2023-11-20
Payer: MEDICAID

## 2023-11-24 ENCOUNTER — PATIENT MESSAGE (OUTPATIENT)
Dept: INTERNAL MEDICINE | Facility: CLINIC | Age: 43
End: 2023-11-24
Payer: MEDICAID

## 2023-12-15 ENCOUNTER — PATIENT MESSAGE (OUTPATIENT)
Dept: INTERNAL MEDICINE | Facility: CLINIC | Age: 43
End: 2023-12-15
Payer: MEDICAID

## 2023-12-15 ENCOUNTER — E-VISIT (OUTPATIENT)
Dept: FAMILY MEDICINE | Facility: CLINIC | Age: 43
End: 2023-12-15
Payer: MEDICAID

## 2023-12-15 DIAGNOSIS — J32.9 SINUSITIS, UNSPECIFIED CHRONICITY, UNSPECIFIED LOCATION: Primary | ICD-10-CM

## 2023-12-15 DIAGNOSIS — J11.1 FLU: ICD-10-CM

## 2023-12-15 DIAGNOSIS — R05.9 COUGH, UNSPECIFIED TYPE: ICD-10-CM

## 2023-12-15 PROCEDURE — 99422 PR E&M, ONLINE DIGIT, EST, < 7 DAYS,  11-20 MINS: ICD-10-PCS | Mod: ,,, | Performed by: STUDENT IN AN ORGANIZED HEALTH CARE EDUCATION/TRAINING PROGRAM

## 2023-12-15 PROCEDURE — 99422 OL DIG E/M SVC 11-20 MIN: CPT | Mod: ,,, | Performed by: STUDENT IN AN ORGANIZED HEALTH CARE EDUCATION/TRAINING PROGRAM

## 2023-12-15 RX ORDER — OSELTAMIVIR PHOSPHATE 75 MG/1
75 CAPSULE ORAL 2 TIMES DAILY
Qty: 10 CAPSULE | Refills: 0 | Status: SHIPPED | OUTPATIENT
Start: 2023-12-15 | End: 2023-12-20

## 2023-12-15 RX ORDER — PROMETHAZINE HYDROCHLORIDE AND DEXTROMETHORPHAN HYDROBROMIDE 6.25; 15 MG/5ML; MG/5ML
5 SYRUP ORAL EVERY 6 HOURS PRN
Qty: 118 ML | Refills: 1 | Status: SHIPPED | OUTPATIENT
Start: 2023-12-15 | End: 2023-12-25

## 2023-12-15 RX ORDER — OXYMETAZOLINE HCL 0.05 %
2 SPRAY, NON-AEROSOL (ML) NASAL 2 TIMES DAILY
Qty: 6 ML | Refills: 0 | Status: SHIPPED | OUTPATIENT
Start: 2023-12-15 | End: 2023-12-18

## 2023-12-15 NOTE — PROGRESS NOTES
Patient ID: Karen Rudolph is a 43 y.o. female.    Chief Complaint: Sinusitis          274}  The patient initiated a request through [facility] on 12/15/2023 for evaluation and management with a chief complaint of Sinusitis     I evaluated the questionnaire submission on 12/15/2023 .    [facility] P[facility] Evisit Upper Respitatory/Cough Questionnaire    12/15/2023 10:27 AM CST - Filed by Patient   Do you agree to participate in an E-Visit? Yes   If you have any of the following symptoms, please present to your local ER or call 911:  I acknowledge   What is the main issue that you would like for your doctor to address today? Congestion/runny nose/headachekids getting over the flu   Are you able to take your vital signs? No   Are you currently pregnant, could you be pregnant, or are you breast feeding? None of the above   What symptoms do you currently have?  Cough;  Headache;  Nasal Congestion;  Runny nose   Describe your cough: Dry   Have you ever smoked? I currently smoke   Have you had a fever? No   When did your symptoms first appear? 12/12/2023   In the last two weeks, have you been in close contact with someone who has COVID-19 or the Flu? Yes, Flu   In the last two weeks, have you worked or volunteered in a healthcare facility or as a ? Healthcare facilities include a hospital, medical or dental clinic, long-term care facility, or nursing home No   Do you live in a long-term care facility, nursing home, group home, or homeless shelter? No   List what you have done or taken to help your symptoms. Tylenol and otc naproxen   How severe are your symptoms? Moderate   Have your symptoms improved since they first appeared? Worse   Have you taken an at home Covid test? No   Have you taken a Flu test? No   Have you been fully vaccinated for COVID? (2 Pfizer, 2 Moderna or 1 Jaquan & Jaquan vaccine injections) Yes   Have you received a booster? No   Have you recieved a Flu shot? No   Do you have transportation to  get tested for COVID if it is indicated and ordered for you at an [facility] location? Yes   Provide any information you feel is important to your history not asked above History of high bp   Please attach any relevant images or files           Active Problem List with Overview Notes    Diagnosis Date Noted    Abnormal increased muscle tightness 2021    Poor posture 2021    Decreased strength, endurance, and mobility 2021    Iron deficiency anemia 2016    Tobacco use disorder 2016    Morbid obesity with BMI of 40.0-44.9, adult 2015    Hyperlipidemia 2015    Family history of diabetes mellitus 2015      Recent Labs Obtained:  Lab Results   Component Value Date    WBC 5.87 10/03/2023    HGB 12.2 10/03/2023    HCT 38.1 10/03/2023    MCV 93 10/03/2023     10/03/2023     10/03/2023    K 4.2 10/03/2023    GLU 78 10/03/2023    CREATININE 0.7 10/03/2023    EGFRNORACEVR >60.0 10/03/2023    HGBA1C 5.0 10/03/2023      Review of patient's allergies indicates:  No Known Allergies    Encounter Diagnoses   Name Primary?    Sinusitis, unspecified chronicity, unspecified location Yes    Cough, unspecified type     Flu         No orders of the defined types were placed in this encounter.     Medications Ordered This Encounter   Medications    oseltamivir (TAMIFLU) 75 MG capsule     Sig: Take 1 capsule (75 mg total) by mouth 2 (two) times daily. for 5 days     Dispense:  10 capsule     Refill:  0    oxymetazoline (AFRIN, OXYMETAZOLINE,) 0.05 % nasal spray     Si sprays by Nasal route 2 (two) times daily. Do not take over 3 days due to rebound congestion for 3 days     Dispense:  6 mL     Refill:  0    promethazine-dextromethorphan (PROMETHAZINE-DM) 6.25-15 mg/5 mL Syrp     Sig: Take 5 mLs by mouth every 6 (six) hours as needed (cough).     Dispense:  118 mL     Refill:  1        E-Visit Time Tracking:    Day 1 Time (in minutes): 13     Total Time (in minutes): 13        274}

## 2024-02-26 ENCOUNTER — TELEPHONE (OUTPATIENT)
Dept: INTERNAL MEDICINE | Facility: CLINIC | Age: 44
End: 2024-02-26
Payer: MEDICAID

## 2024-02-26 DIAGNOSIS — Z00.00 ROUTINE ADULT HEALTH MAINTENANCE: Primary | ICD-10-CM

## 2024-04-01 ENCOUNTER — PATIENT MESSAGE (OUTPATIENT)
Dept: INTERNAL MEDICINE | Facility: CLINIC | Age: 44
End: 2024-04-01
Payer: MEDICAID

## 2024-04-04 ENCOUNTER — OFFICE VISIT (OUTPATIENT)
Dept: INTERNAL MEDICINE | Facility: CLINIC | Age: 44
End: 2024-04-04
Payer: MEDICAID

## 2024-04-04 VITALS
TEMPERATURE: 98 F | HEART RATE: 94 BPM | SYSTOLIC BLOOD PRESSURE: 172 MMHG | HEIGHT: 69 IN | BODY MASS INDEX: 39.08 KG/M2 | WEIGHT: 263.88 LBS | DIASTOLIC BLOOD PRESSURE: 110 MMHG | OXYGEN SATURATION: 98 %

## 2024-04-04 DIAGNOSIS — Z00.00 ROUTINE ADULT HEALTH MAINTENANCE: Primary | ICD-10-CM

## 2024-04-04 DIAGNOSIS — F17.200 SMOKER: ICD-10-CM

## 2024-04-04 DIAGNOSIS — F41.0 PANIC DISORDER: ICD-10-CM

## 2024-04-04 DIAGNOSIS — F32.A DEPRESSION, UNSPECIFIED DEPRESSION TYPE: ICD-10-CM

## 2024-04-04 DIAGNOSIS — I10 HYPERTENSION, UNSPECIFIED TYPE: ICD-10-CM

## 2024-04-04 PROCEDURE — 99214 OFFICE O/P EST MOD 30 MIN: CPT | Mod: PBBFAC,PO | Performed by: FAMILY MEDICINE

## 2024-04-04 PROCEDURE — 3080F DIAST BP >= 90 MM HG: CPT | Mod: CPTII,,, | Performed by: FAMILY MEDICINE

## 2024-04-04 PROCEDURE — 3077F SYST BP >= 140 MM HG: CPT | Mod: CPTII,,, | Performed by: FAMILY MEDICINE

## 2024-04-04 PROCEDURE — 3008F BODY MASS INDEX DOCD: CPT | Mod: CPTII,,, | Performed by: FAMILY MEDICINE

## 2024-04-04 PROCEDURE — 99396 PREV VISIT EST AGE 40-64: CPT | Mod: S$PBB,,, | Performed by: FAMILY MEDICINE

## 2024-04-04 PROCEDURE — 99999 PR PBB SHADOW E&M-EST. PATIENT-LVL IV: CPT | Mod: PBBFAC,,, | Performed by: FAMILY MEDICINE

## 2024-04-04 PROCEDURE — 1159F MED LIST DOCD IN RCRD: CPT | Mod: CPTII,,, | Performed by: FAMILY MEDICINE

## 2024-04-04 RX ORDER — ESCITALOPRAM OXALATE 10 MG/1
10 TABLET ORAL DAILY
Qty: 30 TABLET | Refills: 0 | Status: SHIPPED | OUTPATIENT
Start: 2024-04-04 | End: 2024-04-18

## 2024-04-04 RX ORDER — LISINOPRIL 20 MG/1
20 TABLET ORAL DAILY
Qty: 90 TABLET | Refills: 3 | Status: SHIPPED | OUTPATIENT
Start: 2024-04-04 | End: 2024-04-18 | Stop reason: SDUPTHER

## 2024-04-04 RX ORDER — ROSUVASTATIN CALCIUM 10 MG/1
10 TABLET, COATED ORAL DAILY
Qty: 90 TABLET | Refills: 3 | Status: SHIPPED | OUTPATIENT
Start: 2024-04-04 | End: 2025-04-04

## 2024-04-04 RX ORDER — ALPRAZOLAM 0.5 MG/1
0.5 TABLET ORAL 2 TIMES DAILY PRN
Qty: 30 TABLET | Refills: 1 | Status: SHIPPED | OUTPATIENT
Start: 2024-04-04

## 2024-04-04 RX ORDER — ESCITALOPRAM OXALATE 20 MG/1
20 TABLET ORAL DAILY
Qty: 30 TABLET | Refills: 6 | Status: SHIPPED | OUTPATIENT
Start: 2024-04-04 | End: 2024-04-19

## 2024-04-04 NOTE — PROGRESS NOTES
Subjective:      Patient ID: Karen Rudolph is a 43 y.o. female.    Chief Complaint: Annual Exam and Medication Refill      Patient here for routine follow up. She has been out of all medications for some time now.   Reviewed recent labs with the patient-lipids are increased.  She reports that she tries to eat healthy diet  She reports increased dysphoria and panic attacks, is a stay at home mom  Blood pressure elevated, was previously controlled on lisinopril    Medication Refill  Pertinent negatives include no abdominal pain.     Review of Systems   Constitutional:  Negative for activity change, appetite change and unexpected weight change.   Respiratory:  Negative for shortness of breath.    Cardiovascular:  Negative for leg swelling.   Gastrointestinal:  Negative for abdominal pain.     Past Medical History:   Diagnosis Date    Cholelithiasis     Chronic back pain     Depression           Past Surgical History:   Procedure Laterality Date     SECTION      COLPOSCOPY       Family History   Problem Relation Age of Onset    Diabetes Mother     Hypertension Mother      Social History     Socioeconomic History    Marital status: Single   Tobacco Use    Smoking status: Every Day     Types: Cigarettes    Smokeless tobacco: Current   Substance and Sexual Activity    Alcohol use: Yes     Alcohol/week: 0.0 standard drinks of alcohol     Comment: weekly     Drug use: No    Sexual activity: Yes     Partners: Male     Birth control/protection: None     Social Determinants of Health     Financial Resource Strain: Low Risk  (2024)    Overall Financial Resource Strain (CARDIA)     Difficulty of Paying Living Expenses: Not hard at all   Food Insecurity: No Food Insecurity (2024)    Hunger Vital Sign     Worried About Running Out of Food in the Last Year: Never true     Ran Out of Food in the Last Year: Never true   Transportation Needs: No Transportation Needs (2024)    PRAPARE - Transportation     Lack of  "Transportation (Medical): No     Lack of Transportation (Non-Medical): No   Physical Activity: Sufficiently Active (4/4/2024)    Exercise Vital Sign     Days of Exercise per Week: 5 days     Minutes of Exercise per Session: 60 min   Stress: Stress Concern Present (4/4/2024)    [employer] - Occupational Stress Questionnaire     Feeling of Stress : Very much   Social Connections: Unknown (4/4/2024)    Social Connection and Isolation Panel [NHANES]     Frequency of Communication with Friends and Family: More than three times a week     Frequency of Social Gatherings with Friends and Family: Once a week     Active Member of Clubs or Organizations: No     Attends Club or Organization Meetings: Never     Marital Status: Living with partner   Housing Stability: Low Risk  (4/4/2024)    Housing Stability Vital Sign     Unable to Pay for Housing in the Last Year: No     Number of Places Lived in the Last Year: 1     Unstable Housing in the Last Year: No     Review of patient's allergies indicates:  No Known Allergies    Objective:       BP (!) 172/110 (BP Location: Right arm, Patient Position: Sitting, BP Method: Large (Manual)) Comment: Patient stated she has been off of medications over 1 year.  Pulse 94   Temp 97.6 °F (36.4 °C) (Tympanic)   Ht 5' 8.5" (1.74 m)   Wt 119.7 kg (263 lb 14.3 oz)   SpO2 98%   BMI 39.54 kg/m²   Physical Exam  Vitals reviewed.   Constitutional:       General: She is not in acute distress.     Appearance: Normal appearance. She is well-developed. She is obese. She is not ill-appearing or diaphoretic.   HENT:      Head: Normocephalic and atraumatic.      Right Ear: Hearing, tympanic membrane, ear canal and external ear normal.      Left Ear: Hearing, tympanic membrane, ear canal and external ear normal.      Nose: Nose normal.      Mouth/Throat:      Pharynx: Uvula midline. No oropharyngeal exudate.   Eyes:      Conjunctiva/sclera: Conjunctivae normal.      " Pupils: Pupils are equal, round, and reactive to light.   Neck:      Thyroid: No thyromegaly.      Trachea: No tracheal deviation.   Cardiovascular:      Rate and Rhythm: Normal rate and regular rhythm.      Heart sounds: Normal heart sounds. No murmur heard.  Pulmonary:      Effort: Pulmonary effort is normal. No respiratory distress.      Breath sounds: Normal breath sounds.   Abdominal:      General: Bowel sounds are normal.      Palpations: Abdomen is soft.      Tenderness: There is no abdominal tenderness. There is no guarding.      Hernia: No hernia is present.   Musculoskeletal:         General: Normal range of motion.      Cervical back: Normal range of motion and neck supple.      Right lower leg: No edema.      Left lower leg: No edema.   Lymphadenopathy:      Cervical: No cervical adenopathy.   Skin:     General: Skin is warm and dry.      Capillary Refill: Capillary refill takes less than 2 seconds.   Neurological:      General: No focal deficit present.      Mental Status: She is alert and oriented to person, place, and time.   Psychiatric:         Mood and Affect: Mood normal.         Behavior: Behavior normal.         Thought Content: Thought content normal.         Judgment: Judgment normal.       Assessment:     1. Routine adult health maintenance    2. Depression, unspecified depression type    3. Hypertension, unspecified type    4. Smoker    5. Panic disorder      Plan:   Routine adult health maintenance    Depression, unspecified depression type  -     EScitalopram oxalate (LEXAPRO) 20 MG tablet; Take 1 tablet (20 mg total) by mouth once daily.  Dispense: 30 tablet; Refill: 6  -     EScitalopram oxalate (LEXAPRO) 10 MG tablet; Take 1 tablet (10 mg total) by mouth once daily.  Dispense: 30 tablet; Refill: 0    Hypertension, unspecified type    Smoker  -     Ambulatory referral/consult to Smoking Cessation Program; Future; Expected date: 04/11/2024    Panic disorder    Other orders  -      lisinopriL (PRINIVIL,ZESTRIL) 20 MG tablet; Take 1 tablet (20 mg total) by mouth once daily.  Dispense: 90 tablet; Refill: 3  -     rosuvastatin (CRESTOR) 10 MG tablet; Take 1 tablet (10 mg total) by mouth once daily.  Dispense: 90 tablet; Refill: 3  -     ALPRAZolam (XANAX) 0.5 MG tablet; Take 1 tablet (0.5 mg total) by mouth 2 (two) times daily as needed for Anxiety.  Dispense: 30 tablet; Refill: 1    Cautioned patient that alprazolam should be only taken occasionally  Above labs in 3 months prior to visit with me.      Medication List with Changes/Refills   New Medications    ESCITALOPRAM OXALATE (LEXAPRO) 10 MG TABLET    Take 1 tablet (10 mg total) by mouth once daily.    ROSUVASTATIN (CRESTOR) 10 MG TABLET    Take 1 tablet (10 mg total) by mouth once daily.   Current Medications    AZELASTINE (ASTELIN) 137 MCG (0.1 %) NASAL SPRAY    1 spray (137 mcg total) by Nasal route 2 (two) times daily.    FAMCICLOVIR (FAMVIR) 500 MG TABLET    Take 500 mg by mouth 3 (three) times daily.   Changed and/or Refilled Medications    Modified Medication Previous Medication    ALPRAZOLAM (XANAX) 0.5 MG TABLET ALPRAZolam (XANAX) 0.5 MG tablet       Take 1 tablet (0.5 mg total) by mouth 2 (two) times daily as needed for Anxiety.    TAKE 1 TABLET BY MOUTH 2 TIMES DAILY AS NEEDED FOR ANXIETY.    ESCITALOPRAM OXALATE (LEXAPRO) 20 MG TABLET EScitalopram oxalate (LEXAPRO) 20 MG tablet       Take 1 tablet (20 mg total) by mouth once daily.    Take 1 tablet (20 mg total) by mouth once daily.    LISINOPRIL (PRINIVIL,ZESTRIL) 20 MG TABLET lisinopriL (PRINIVIL,ZESTRIL) 20 MG tablet       Take 1 tablet (20 mg total) by mouth once daily.    TAKE 1 TABLET BY MOUTH EVERY DAY   Discontinued Medications    AMITRIPTYLINE (ELAVIL) 50 MG TABLET    Take 1 tablet (50 mg total) by mouth every evening.    GABAPENTIN (NEURONTIN) 100 MG CAPSULE    Take 1 capsule (100 mg total) by mouth 3 (three) times daily as needed.    PROMETHAZINE-DEXTROMETHORPHAN  (PROMETHAZINE-DM) 6.25-15 MG/5 ML SYRP    Take 5 mLs by mouth every 6 (six) hours as needed (cough/congestion).

## 2024-04-04 NOTE — PATIENT INSTRUCTIONS
Some of the medications we use off label for weight loss would include wellbutrin (buproprion), topiramate (topamax) or naltrexone.

## 2024-04-10 ENCOUNTER — CLINICAL SUPPORT (OUTPATIENT)
Dept: SMOKING CESSATION | Facility: CLINIC | Age: 44
End: 2024-04-10

## 2024-04-10 DIAGNOSIS — F17.200 NICOTINE DEPENDENCE: Primary | ICD-10-CM

## 2024-04-10 RX ORDER — IBUPROFEN 200 MG
1 TABLET ORAL DAILY
Qty: 14 PATCH | Refills: 0 | Status: SHIPPED | OUTPATIENT
Start: 2024-04-10 | End: 2024-04-25 | Stop reason: SDUPTHER

## 2024-04-10 NOTE — PROGRESS NOTES
Patient was seen virtually today and reports smoking 6-7 cigarettes per day. She will begin biweekly tobacco cessation sessions and a tobacco cessation medication regimen of 21mg nicotine patch. Suggested that patient set a hard boundary of no more than 5 cigarettes per day and not smoke in her car, being mindful of doing nothing else when smoking. Discussed with patient the harms of menthol cigarettes. Discussed tips and strategies to assist with quit. The patient will continue with  therapy sessions and medication monitoring by CTTS. Prescribed medication management will be by physician.

## 2024-04-18 ENCOUNTER — OFFICE VISIT (OUTPATIENT)
Dept: INTERNAL MEDICINE | Facility: CLINIC | Age: 44
End: 2024-04-18
Payer: MEDICAID

## 2024-04-18 VITALS
SYSTOLIC BLOOD PRESSURE: 148 MMHG | WEIGHT: 271.38 LBS | TEMPERATURE: 97 F | DIASTOLIC BLOOD PRESSURE: 100 MMHG | OXYGEN SATURATION: 99 % | HEIGHT: 69 IN | BODY MASS INDEX: 40.2 KG/M2 | HEART RATE: 90 BPM

## 2024-04-18 DIAGNOSIS — F32.A DEPRESSION, UNSPECIFIED DEPRESSION TYPE: Primary | ICD-10-CM

## 2024-04-18 DIAGNOSIS — I10 HYPERTENSION, UNSPECIFIED TYPE: ICD-10-CM

## 2024-04-18 PROCEDURE — 1159F MED LIST DOCD IN RCRD: CPT | Mod: CPTII,,, | Performed by: FAMILY MEDICINE

## 2024-04-18 PROCEDURE — 99999 PR PBB SHADOW E&M-EST. PATIENT-LVL III: CPT | Mod: PBBFAC,,, | Performed by: FAMILY MEDICINE

## 2024-04-18 PROCEDURE — 4010F ACE/ARB THERAPY RXD/TAKEN: CPT | Mod: CPTII,,, | Performed by: FAMILY MEDICINE

## 2024-04-18 PROCEDURE — 3008F BODY MASS INDEX DOCD: CPT | Mod: CPTII,,, | Performed by: FAMILY MEDICINE

## 2024-04-18 PROCEDURE — 3080F DIAST BP >= 90 MM HG: CPT | Mod: CPTII,,, | Performed by: FAMILY MEDICINE

## 2024-04-18 PROCEDURE — 99213 OFFICE O/P EST LOW 20 MIN: CPT | Mod: PBBFAC,PO | Performed by: FAMILY MEDICINE

## 2024-04-18 PROCEDURE — 3077F SYST BP >= 140 MM HG: CPT | Mod: CPTII,,, | Performed by: FAMILY MEDICINE

## 2024-04-18 PROCEDURE — 99213 OFFICE O/P EST LOW 20 MIN: CPT | Mod: S$PBB,,, | Performed by: FAMILY MEDICINE

## 2024-04-18 RX ORDER — LISINOPRIL 40 MG/1
40 TABLET ORAL DAILY
Qty: 30 TABLET | Refills: 5 | Status: SHIPPED | OUTPATIENT
Start: 2024-04-18 | End: 2024-04-18

## 2024-04-18 RX ORDER — LISINOPRIL 40 MG/1
40 TABLET ORAL DAILY
Qty: 30 TABLET | Refills: 5 | Status: SHIPPED | OUTPATIENT
Start: 2024-04-18

## 2024-04-18 NOTE — PROGRESS NOTES
Subjective:      Patient ID: Karen Rudolph is a 43 y.o. female.    Chief Complaint: Follow-up      Patient here for follow-up.  She started taking Lexapro 10 mg daily about 2 weeks ago with plans to increase to 20 mg at the end of the month.  She does report seeing some improvement in her mood, feeling better although not back to baseline.  Reports some days she just wants to stay in bed all day.  Taking alprazolam only as needed-has taken it twice since last visit  Reports when she checks her blood pressure at home and has consistently been elevated, similar to today    Follow-up      Review of Systems   Psychiatric/Behavioral:  Positive for decreased concentration, dysphoric mood and sleep disturbance. The patient is nervous/anxious.      Past Medical History:   Diagnosis Date    Cholelithiasis     Chronic back pain     Depression           Past Surgical History:   Procedure Laterality Date     SECTION      COLPOSCOPY       Family History   Problem Relation Name Age of Onset    Diabetes Mother      Hypertension Mother       Social History     Socioeconomic History    Marital status: Single   Tobacco Use    Smoking status: Every Day     Types: Cigarettes    Smokeless tobacco: Current   Substance and Sexual Activity    Alcohol use: Yes     Alcohol/week: 0.0 standard drinks of alcohol     Comment: weekly     Drug use: No    Sexual activity: Yes     Partners: Male     Birth control/protection: None     Social Determinants of Health     Financial Resource Strain: Low Risk  (2024)    Overall Financial Resource Strain (CARDIA)     Difficulty of Paying Living Expenses: Not hard at all   Food Insecurity: No Food Insecurity (2024)    Hunger Vital Sign     Worried About Running Out of Food in the Last Year: Never true     Ran Out of Food in the Last Year: Never true   Transportation Needs: No Transportation Needs (2024)    PRAPARE - Transportation     Lack of Transportation (Medical): No     Lack of  "Transportation (Non-Medical): No   Physical Activity: Sufficiently Active (4/4/2024)    Exercise Vital Sign     Days of Exercise per Week: 5 days     Minutes of Exercise per Session: 60 min   Stress: Stress Concern Present (4/4/2024)    [employer] - Occupational Stress Questionnaire     Feeling of Stress : Very much   Social Connections: Unknown (4/4/2024)    Social Connection and Isolation Panel [NHANES]     Frequency of Communication with Friends and Family: More than three times a week     Frequency of Social Gatherings with Friends and Family: Once a week     Active Member of Clubs or Organizations: No     Attends Club or Organization Meetings: Never     Marital Status: Living with partner   Housing Stability: Low Risk  (4/4/2024)    Housing Stability Vital Sign     Unable to Pay for Housing in the Last Year: No     Number of Places Lived in the Last Year: 1     Unstable Housing in the Last Year: No     Review of patient's allergies indicates:  No Known Allergies    Objective:       BP (!) 148/100 (BP Location: Right arm, Patient Position: Sitting, BP Method: Large (Manual))   Pulse 90   Temp 97.4 °F (36.3 °C) (Tympanic)   Ht 5' 8.5" (1.74 m)   Wt 123.1 kg (271 lb 6.2 oz)   SpO2 99%   BMI 40.66 kg/m²   Physical Exam  Constitutional:       General: She is not in acute distress.     Appearance: Normal appearance. She is well-developed. She is obese. She is not ill-appearing or diaphoretic.   Cardiovascular:      Rate and Rhythm: Normal rate and regular rhythm.      Heart sounds: Normal heart sounds.   Pulmonary:      Effort: Pulmonary effort is normal.      Breath sounds: Normal breath sounds.   Neurological:      Mental Status: She is alert and oriented to person, place, and time.   Psychiatric:         Mood and Affect: Mood normal.         Behavior: Behavior normal.         Thought Content: Thought content normal.         Judgment: Judgment normal.       Assessment:     1. " Depression, unspecified depression type    2. Hypertension, unspecified type      Plan:   Depression, unspecified depression type    Hypertension, unspecified type    Other orders  -     lisinopriL (PRINIVIL,ZESTRIL) 40 MG tablet; Take 1 tablet (40 mg total) by mouth once daily.  Dispense: 30 tablet; Refill: 5    Continue all other current medications.   She will continue with plan to increase Lexapro to 20 mg at the end of this month.  Continue all other current medications.  She will follow up with me in about 3 more weeks  Medication List with Changes/Refills   Current Medications    ALPRAZOLAM (XANAX) 0.5 MG TABLET    Take 1 tablet (0.5 mg total) by mouth 2 (two) times daily as needed for Anxiety.    AZELASTINE (ASTELIN) 137 MCG (0.1 %) NASAL SPRAY    1 spray (137 mcg total) by Nasal route 2 (two) times daily.    ESCITALOPRAM OXALATE (LEXAPRO) 20 MG TABLET    Take 1 tablet (20 mg total) by mouth once daily.    FAMCICLOVIR (FAMVIR) 500 MG TABLET    Take 500 mg by mouth 3 (three) times daily.    NICOTINE (NICODERM CQ) 21 MG/24 HR    Place 1 patch onto the skin once daily.    ROSUVASTATIN (CRESTOR) 10 MG TABLET    Take 1 tablet (10 mg total) by mouth once daily.   Changed and/or Refilled Medications    Modified Medication Previous Medication    LISINOPRIL (PRINIVIL,ZESTRIL) 40 MG TABLET lisinopriL (PRINIVIL,ZESTRIL) 20 MG tablet       Take 1 tablet (40 mg total) by mouth once daily.    Take 1 tablet (20 mg total) by mouth once daily.   Discontinued Medications    ESCITALOPRAM OXALATE (LEXAPRO) 10 MG TABLET    Take 1 tablet (10 mg total) by mouth once daily.

## 2024-04-22 ENCOUNTER — OFFICE VISIT (OUTPATIENT)
Dept: OBSTETRICS AND GYNECOLOGY | Facility: CLINIC | Age: 44
End: 2024-04-22
Payer: MEDICAID

## 2024-04-22 ENCOUNTER — LAB VISIT (OUTPATIENT)
Dept: LAB | Facility: HOSPITAL | Age: 44
End: 2024-04-22
Attending: NURSE PRACTITIONER
Payer: MEDICAID

## 2024-04-22 VITALS
HEIGHT: 69 IN | SYSTOLIC BLOOD PRESSURE: 128 MMHG | WEIGHT: 272.25 LBS | BODY MASS INDEX: 40.32 KG/M2 | DIASTOLIC BLOOD PRESSURE: 80 MMHG

## 2024-04-22 DIAGNOSIS — Z12.31 SCREENING MAMMOGRAM FOR BREAST CANCER: ICD-10-CM

## 2024-04-22 DIAGNOSIS — Z01.419 ROUTINE GYNECOLOGICAL EXAMINATION: Primary | ICD-10-CM

## 2024-04-22 DIAGNOSIS — Z12.4 PAPANICOLAOU SMEAR FOR CERVICAL CANCER SCREENING: ICD-10-CM

## 2024-04-22 DIAGNOSIS — B00.9 HSV INFECTION: ICD-10-CM

## 2024-04-22 DIAGNOSIS — Z11.3 SCREEN FOR STD (SEXUALLY TRANSMITTED DISEASE): ICD-10-CM

## 2024-04-22 LAB
HAV IGM SERPL QL IA: NORMAL
HBV CORE IGM SERPL QL IA: NORMAL
HBV SURFACE AG SERPL QL IA: NORMAL
HCV AB SERPL QL IA: NORMAL
HIV 1+2 AB+HIV1 P24 AG SERPL QL IA: NORMAL
TREPONEMA PALLIDUM IGG+IGM AB [PRESENCE] IN SERUM OR PLASMA BY IMMUNOASSAY: NONREACTIVE

## 2024-04-22 PROCEDURE — 3079F DIAST BP 80-89 MM HG: CPT | Mod: CPTII,,, | Performed by: NURSE PRACTITIONER

## 2024-04-22 PROCEDURE — 86593 SYPHILIS TEST NON-TREP QUANT: CPT | Performed by: NURSE PRACTITIONER

## 2024-04-22 PROCEDURE — 36415 COLL VENOUS BLD VENIPUNCTURE: CPT | Performed by: NURSE PRACTITIONER

## 2024-04-22 PROCEDURE — 80074 ACUTE HEPATITIS PANEL: CPT | Performed by: NURSE PRACTITIONER

## 2024-04-22 PROCEDURE — 87389 HIV-1 AG W/HIV-1&-2 AB AG IA: CPT | Performed by: NURSE PRACTITIONER

## 2024-04-22 PROCEDURE — 1160F RVW MEDS BY RX/DR IN RCRD: CPT | Mod: CPTII,,, | Performed by: NURSE PRACTITIONER

## 2024-04-22 PROCEDURE — 99213 OFFICE O/P EST LOW 20 MIN: CPT | Mod: PBBFAC | Performed by: NURSE PRACTITIONER

## 2024-04-22 PROCEDURE — 87624 HPV HI-RISK TYP POOLED RSLT: CPT | Performed by: NURSE PRACTITIONER

## 2024-04-22 PROCEDURE — 99396 PREV VISIT EST AGE 40-64: CPT | Mod: S$PBB,,, | Performed by: NURSE PRACTITIONER

## 2024-04-22 PROCEDURE — 3074F SYST BP LT 130 MM HG: CPT | Mod: CPTII,,, | Performed by: NURSE PRACTITIONER

## 2024-04-22 PROCEDURE — 99999 PR PBB SHADOW E&M-EST. PATIENT-LVL III: CPT | Mod: PBBFAC,,, | Performed by: NURSE PRACTITIONER

## 2024-04-22 PROCEDURE — 88175 CYTOPATH C/V AUTO FLUID REDO: CPT | Performed by: NURSE PRACTITIONER

## 2024-04-22 PROCEDURE — 4010F ACE/ARB THERAPY RXD/TAKEN: CPT | Mod: CPTII,,, | Performed by: NURSE PRACTITIONER

## 2024-04-22 PROCEDURE — 87491 CHLMYD TRACH DNA AMP PROBE: CPT | Performed by: NURSE PRACTITIONER

## 2024-04-22 PROCEDURE — 3008F BODY MASS INDEX DOCD: CPT | Mod: CPTII,,, | Performed by: NURSE PRACTITIONER

## 2024-04-22 PROCEDURE — 1159F MED LIST DOCD IN RCRD: CPT | Mod: CPTII,,, | Performed by: NURSE PRACTITIONER

## 2024-04-22 RX ORDER — FAMCICLOVIR 500 MG/1
500 TABLET ORAL 3 TIMES DAILY
Qty: 90 TABLET | Refills: 3 | Status: SHIPPED | OUTPATIENT
Start: 2024-04-22 | End: 2024-08-20

## 2024-04-22 NOTE — PROGRESS NOTES
"  Subjective:       Patient ID: Karen Rudolph is a 43 y.o. female.    Chief Complaint:  Gynecologic Exam      History of Present Illness  HPI  Desires famcyclovir refills   Health Maintenance   Topic Date Due    Mammogram  Never done    TETANUS VACCINE  2029    Hepatitis C Screening  Completed    Lipid Panel  Completed     GYN & OB History  Patient's last menstrual period was 2024.   Date of Last Pap: today     OB History    Para Term  AB Living   1 1 1     1   SAB IAB Ectopic Multiple Live Births           1      # Outcome Date GA Lbr Jose Enrique/2nd Weight Sex Type Anes PTL Lv   1 Term      CS-LTranv          Review of Systems  Review of Systems        Objective:   /80   Ht 5' 8.5" (1.74 m)   Wt 123.5 kg (272 lb 4.3 oz)   LMP 2024   BMI 40.80 kg/m²    Physical Exam:   Constitutional: She is oriented to person, place, and time. She appears well-developed and well-nourished.        Pulmonary/Chest: Right breast exhibits no inverted nipple, no mass, no nipple discharge, no skin change, no tenderness and no swelling. Left breast exhibits no inverted nipple, no mass, no nipple discharge, no skin change, no tenderness and no swelling.        Abdominal: Soft.     Genitourinary:    Inguinal canal and vagina normal.      Pelvic exam was performed with patient supine.   The external female genitalia was normal.   Genitalia hair distrobution normal .     Labial bartholins normal.Cervix is normal. No erythema, vaginal discharge, bleeding, rectocele, cystocele or prolapse of vaginal walls in the vagina.    No foreign body in the vagina.      No signs of injury in the vagina.      pap smear completed              Neurological: She is alert and oriented to person, place, and time.    Skin: Skin is warm and dry.    Psychiatric: She has a normal mood and affect. Her behavior is normal. Judgment and thought content normal.        Assessment:        1. Routine gynecological examination    2. " Screening mammogram for breast cancer    3. Papanicolaou smear for cervical cancer screening    4. HSV infection    5. Screen for STD (sexually transmitted disease)                Plan:            Karen was seen today for gynecologic exam.    Diagnoses and all orders for this visit:    Routine gynecological examination    Screening mammogram for breast cancer  -     Mammo Digital Screening Bilat w/ Alfonso; Future  -     Hepatitis Panel, Acute; Future    Papanicolaou smear for cervical cancer screening  -     Liquid-Based Pap Smear, Screening  -     HPV High Risk Genotypes, PCR    HSV infection  -     famciclovir (FAMVIR) 500 MG tablet; Take 1 tablet (500 mg total) by mouth 3 (three) times daily.    Screen for STD (sexually transmitted disease)  -     C. trachomatis/N. gonorrhoeae by AMP DNA [facility]; Vagina  -     Treponema Pallidium Antibodies IgG, IgM; Future  -     HIV 1/2 Ag/Ab (4th Gen); Future      Return to clinic in one year for WWE

## 2024-04-24 LAB
C TRACH DNA SPEC QL NAA+PROBE: NOT DETECTED
N GONORRHOEA DNA SPEC QL NAA+PROBE: NOT DETECTED

## 2024-04-25 ENCOUNTER — CLINICAL SUPPORT (OUTPATIENT)
Dept: SMOKING CESSATION | Facility: CLINIC | Age: 44
End: 2024-04-25
Payer: COMMERCIAL

## 2024-04-25 DIAGNOSIS — F17.200 NICOTINE DEPENDENCE: ICD-10-CM

## 2024-04-25 RX ORDER — IBUPROFEN 200 MG
1 TABLET ORAL DAILY
Qty: 28 PATCH | Refills: 0 | Status: SHIPPED | OUTPATIENT
Start: 2024-04-25

## 2024-04-25 NOTE — PROGRESS NOTES
Individual Follow-Up Form    4/25/2024    Quit Date:     Clinical Status of Patient: Outpatient    Length of Service: 45 minutes    Continuing Medication: yes  Patches    Other Medications: none     Target Symptoms: Withdrawal and medication side effects. The following were  rated moderate (3) to severe (4) on TCRS:  Moderate (3): none  Severe (4): none    Comments: Patient was seen in clinic today and reports that she did very well controlling her cigarettes the first week. She was only smoking up to 2 cigarettes and some days not smoking none at all. She then experienced some stressful moments this week, leading her to smoke 6-7 cigarettes per day. She remains on a tobacco cessation medication regimen of 21mg nicotine patch, refill sent to pharmacy. No abnormal behaviors or mental changes reported at this time. Patient states that there was one day that she wore the patch and got a headache, but the next day hadn't and has not since. Suggested that patient start writing and journaling when having stressful moments instead of smoking. Reviewed strategies, cues, and triggers. Introduced the negative impact of tobacco on health, the health advantages of discontinuing the use of tobacco, time line improved health changes after a quit, withdrawal issues to expect from nicotine and habit, and ways to achieve the goal of a quit. The patient will continue with  therapy sessions and medication monitoring by CTTS. Prescribed medication management will be by physician.     Diagnosis: F17.200    Next Visit: 2 weeks

## 2024-04-26 LAB
HPV HR 12 DNA SPEC QL NAA+PROBE: NEGATIVE
HPV16 AG SPEC QL: NEGATIVE
HPV18 DNA SPEC QL NAA+PROBE: NEGATIVE

## 2024-04-27 LAB
FINAL PATHOLOGIC DIAGNOSIS: NORMAL
Lab: NORMAL

## 2024-05-04 ENCOUNTER — PATIENT MESSAGE (OUTPATIENT)
Dept: INTERNAL MEDICINE | Facility: CLINIC | Age: 44
End: 2024-05-04
Payer: MEDICAID

## 2024-05-06 RX ORDER — ESCITALOPRAM OXALATE 20 MG/1
20 TABLET ORAL DAILY
Qty: 90 TABLET | Refills: 3 | Status: SHIPPED | OUTPATIENT
Start: 2024-05-06 | End: 2025-05-06

## 2024-05-06 NOTE — TELEPHONE ENCOUNTER
No care due was identified.  [facility] Embedded Care Due Messages. Reference number: [id number].   5/06/2024 10:05:44 AM CDT

## 2024-05-09 ENCOUNTER — OFFICE VISIT (OUTPATIENT)
Dept: INTERNAL MEDICINE | Facility: CLINIC | Age: 44
End: 2024-05-09
Payer: MEDICAID

## 2024-05-09 ENCOUNTER — CLINICAL SUPPORT (OUTPATIENT)
Dept: SMOKING CESSATION | Facility: CLINIC | Age: 44
End: 2024-05-09

## 2024-05-09 VITALS — SYSTOLIC BLOOD PRESSURE: 125 MMHG | DIASTOLIC BLOOD PRESSURE: 85 MMHG

## 2024-05-09 DIAGNOSIS — F17.200 NICOTINE DEPENDENCE: Primary | ICD-10-CM

## 2024-05-09 DIAGNOSIS — F32.A DEPRESSION, UNSPECIFIED DEPRESSION TYPE: Primary | ICD-10-CM

## 2024-05-09 DIAGNOSIS — G47.00 INSOMNIA, UNSPECIFIED TYPE: ICD-10-CM

## 2024-05-09 PROCEDURE — 3074F SYST BP LT 130 MM HG: CPT | Mod: CPTII,95,, | Performed by: FAMILY MEDICINE

## 2024-05-09 PROCEDURE — 99213 OFFICE O/P EST LOW 20 MIN: CPT | Mod: 95,,, | Performed by: FAMILY MEDICINE

## 2024-05-09 PROCEDURE — 3079F DIAST BP 80-89 MM HG: CPT | Mod: CPTII,95,, | Performed by: FAMILY MEDICINE

## 2024-05-09 PROCEDURE — 4010F ACE/ARB THERAPY RXD/TAKEN: CPT | Mod: CPTII,95,, | Performed by: FAMILY MEDICINE

## 2024-05-09 RX ORDER — ESZOPICLONE 2 MG/1
2 TABLET, FILM COATED ORAL NIGHTLY
Qty: 30 TABLET | Refills: 0 | Status: SHIPPED | OUTPATIENT
Start: 2024-05-09 | End: 2024-06-08

## 2024-05-09 NOTE — PROGRESS NOTES
The patient location is: [place]    The chief complaint leading to consultation is: medication follow up    Visit type: audiovisual    Face to Face time with patient: 8 minutes  15 minutes of total time spent on the encounter, which includes face to face time and non-face to face time preparing to see the patient (eg, review of tests), Obtaining and/or reviewing separately obtained history, Documenting clinical information in the electronic or other health record, Independently interpreting results (not separately reported) and communicating results to the patient/family/caregiver, or Care coordination (not separately reported).         Each patient to whom he or she provides medical services by telemedicine is:  (1) informed of the relationship between the physician and patient and the respective role of any other health care provider with respect to management of the patient; and (2) notified that he or she may decline to receive medical services by telemedicine and may withdraw from such care at any time.    Notes:     Subjective:      Patient ID: Karen Rudolph is a 43 y.o. female.    Chief Complaint: No chief complaint on file.      Follow-up on depression, fatigue-patient just increased her Lexapro dose to 20 mg daily this week, does report slight improvement in her fatigue level since doing this.  Still having trouble sleeping at night, this is chronic and has had for years, has tried trazodone in the past.  She has been monitoring her blood pressure-well controlled with systolics in the 120s, diastolics in the 80s    Hypertension  This is a chronic problem. The current episode started more than 1 year ago. The problem has been gradually improving since onset. The problem is controlled. Associated symptoms include malaise/fatigue and sweats. Pertinent negatives include no anxiety, blurred vision, chest pain, headaches, neck pain, orthopnea, palpitations, peripheral edema, PND or shortness of breath. There  are no associated agents to hypertension. Risk factors for coronary artery disease include dyslipidemia, family history, obesity, smoking/tobacco exposure and stress. Past treatments include nothing. The current treatment provides significant improvement. There are no compliance problems.      Review of Systems   Constitutional:  Positive for fatigue and malaise/fatigue.   Eyes:  Negative for blurred vision.   Respiratory:  Negative for shortness of breath.    Cardiovascular:  Negative for chest pain, palpitations, orthopnea and PND.   Musculoskeletal:  Negative for neck pain.   Neurological:  Negative for headaches.   Psychiatric/Behavioral:  Positive for sleep disturbance. Negative for dysphoric mood. The patient is not nervous/anxious.      Past Medical History:   Diagnosis Date    Cholelithiasis     Chronic back pain     Depression           Past Surgical History:   Procedure Laterality Date     SECTION      COLPOSCOPY       Family History   Problem Relation Name Age of Onset    Diabetes Mother      Hypertension Mother       Social History     Socioeconomic History    Marital status: Single   Tobacco Use    Smoking status: Every Day     Types: Cigarettes    Smokeless tobacco: Current   Substance and Sexual Activity    Alcohol use: Yes     Alcohol/week: 0.0 standard drinks of alcohol     Comment: weekly     Drug use: No    Sexual activity: Yes     Partners: Male     Birth control/protection: None     Social Determinants of Health     Financial Resource Strain: Low Risk  (2024)    Overall Financial Resource Strain (CARDIA)     Difficulty of Paying Living Expenses: Not hard at all   Food Insecurity: No Food Insecurity (2024)    Hunger Vital Sign     Worried About Running Out of Food in the Last Year: Never true     Ran Out of Food in the Last Year: Never true   Transportation Needs: No Transportation Needs (2024)    PRAPARE - Transportation     Lack of Transportation (Medical): No     Lack of  Transportation (Non-Medical): No   Physical Activity: Sufficiently Active (4/4/2024)    Exercise Vital Sign     Days of Exercise per Week: 5 days     Minutes of Exercise per Session: 60 min   Stress: Stress Concern Present (4/4/2024)    [employer] - Occupational Stress Questionnaire     Feeling of Stress : Very much   Housing Stability: Low Risk  (4/4/2024)    Housing Stability Vital Sign     Unable to Pay for Housing in the Last Year: No     Number of Places Lived in the Last Year: 1     Unstable Housing in the Last Year: No     Review of patient's allergies indicates:  No Known Allergies    Objective:       /85   LMP 04/16/2024   Physical Exam  Constitutional:       General: She is not in acute distress.     Appearance: Normal appearance. She is well-developed. She is not ill-appearing or diaphoretic.   Neurological:      Mental Status: She is alert and oriented to person, place, and time.   Psychiatric:         Mood and Affect: Mood normal.         Behavior: Behavior normal.         Thought Content: Thought content normal.         Judgment: Judgment normal.       Assessment:     1. Depression, unspecified depression type    2. Insomnia, unspecified type      Plan:   Depression, unspecified depression type    Insomnia, unspecified type    Other orders  -     eszopiclone (LUNESTA) 2 MG Tab; Take 1 tablet (2 mg total) by mouth every evening.  Dispense: 30 tablet; Refill: 0    Will try to get Lunesta-patient is let me know if not covered by her insurance.  Continue Lexapro 20 mg daily  She will send me update on how medication is working in about 3 more weeks  Continue all other current medications.     Medication List with Changes/Refills   New Medications    ESZOPICLONE (LUNESTA) 2 MG TAB    Take 1 tablet (2 mg total) by mouth every evening.   Current Medications    ALPRAZOLAM (XANAX) 0.5 MG TABLET    Take 1 tablet (0.5 mg total) by mouth 2 (two) times daily as needed for  Anxiety.    ESCITALOPRAM OXALATE (LEXAPRO) 20 MG TABLET    Take 1 tablet (20 mg total) by mouth once daily.    FAMCICLOVIR (FAMVIR) 500 MG TABLET    Take 1 tablet (500 mg total) by mouth 3 (three) times daily.    LISINOPRIL (PRINIVIL,ZESTRIL) 40 MG TABLET    Take 1 tablet (40 mg total) by mouth once daily.    NICOTINE (NICODERM CQ) 21 MG/24 HR    Place 1 patch onto the skin once daily.    ROSUVASTATIN (CRESTOR) 10 MG TABLET    Take 1 tablet (10 mg total) by mouth once daily.

## 2024-05-09 NOTE — PROGRESS NOTES
"Individual Follow-Up Form    5/9/2024    Quit Date:     Clinical Status of Patient: Outpatient    Length of Service: 30 minutes    Continuing Medication: yes  Patches    Other Medications: none     Target Symptoms: Withdrawal and medication side effects. The following were  rated moderate (3) to severe (4) on TCRS:  Moderate (3): none  Severe (4): none    Comments: Patient was seen virtually today and reports smoking 3-4 cigarettes some days but has not been smoking daily. She remains on a tobacco cessation medication regimen of 21mg nicotine patch, no refill needed at this time and no abnormal behaviors or mental changes reported at this time. Patient reports that she has been journaling when having racing thoughts and it has helped her cut back smoking tremendously. Commended patient on her hard work and dedication to this quit attempt. She states that she currently is out of cigarettes and may purchase more but is not sure when. Suggested that patient attempt to wait as long as possible to purchase a pack and be sure not to get any "just in case" cigarettes. She states that she has noticed that she snacks more often, to refrain from smoking as well. Reviewed strategies, controlling environment, cues, triggers, new goals set. Introduced high risk situations with preparation interventions, caffeine similarities with withdrawal issues of habit and nicotine, alcohol, understanding urges, cravings, stress and relaxation. Open discussion with intervention discussion. The patient will continue with  therapy sessions and medication monitoring by CTTS. Prescribed medication management will be by physician.        Diagnosis: F17.200    Next Visit: 5/30/2024    " APRN - CNP on 2/27/2024 at 2:13 PM    Please note that this chart was generated using voice recognition Dragon dictation software.  Although every effort was made to ensure the accuracy of this automated transcription, some errors in transcription may have occurred.

## 2024-05-13 ENCOUNTER — PATIENT MESSAGE (OUTPATIENT)
Dept: INTERNAL MEDICINE | Facility: CLINIC | Age: 44
End: 2024-05-13
Payer: MEDICAID

## 2024-05-30 ENCOUNTER — TELEPHONE (OUTPATIENT)
Dept: SMOKING CESSATION | Facility: CLINIC | Age: 44
End: 2024-05-30
Payer: MEDICAID

## 2024-08-01 ENCOUNTER — TELEPHONE (OUTPATIENT)
Dept: SMOKING CESSATION | Facility: CLINIC | Age: 44
End: 2024-08-01
Payer: MEDICAID

## 2024-08-01 ENCOUNTER — OFFICE VISIT (OUTPATIENT)
Dept: URGENT CARE | Facility: CLINIC | Age: 44
End: 2024-08-01
Payer: MEDICAID

## 2024-08-01 VITALS
BODY MASS INDEX: 41.92 KG/M2 | DIASTOLIC BLOOD PRESSURE: 90 MMHG | TEMPERATURE: 98 F | HEIGHT: 68 IN | OXYGEN SATURATION: 100 % | HEART RATE: 80 BPM | SYSTOLIC BLOOD PRESSURE: 130 MMHG | RESPIRATION RATE: 18 BRPM | WEIGHT: 276.56 LBS

## 2024-08-01 DIAGNOSIS — R03.0 ELEVATED BP WITHOUT DIAGNOSIS OF HYPERTENSION: ICD-10-CM

## 2024-08-01 DIAGNOSIS — R09.81 SINUS CONGESTION: ICD-10-CM

## 2024-08-01 DIAGNOSIS — F17.200 SMOKER: ICD-10-CM

## 2024-08-01 DIAGNOSIS — R05.9 COUGH, UNSPECIFIED TYPE: Primary | ICD-10-CM

## 2024-08-01 DIAGNOSIS — J06.9 UPPER RESPIRATORY TRACT INFECTION, UNSPECIFIED TYPE: ICD-10-CM

## 2024-08-01 LAB
CTP QC/QA: YES
SARS-COV-2 AG RESP QL IA.RAPID: NEGATIVE

## 2024-08-01 RX ORDER — CHLORPHENIRAMINE MALEATE AND DEXTROMETHORPHAN HYDROBROMIDE 4; 30 MG/1; MG/1
1 TABLET, FILM COATED ORAL
Qty: 24 EACH | Refills: 0 | Status: SHIPPED | OUTPATIENT
Start: 2024-08-01 | End: 2024-08-11

## 2024-08-01 RX ORDER — BENZONATATE 200 MG/1
200 CAPSULE ORAL 3 TIMES DAILY PRN
Qty: 25 CAPSULE | Refills: 0 | Status: SHIPPED | OUTPATIENT
Start: 2024-08-01 | End: 2024-08-11

## 2024-08-01 NOTE — PROGRESS NOTES
"Subjective:      Patient ID: Karen Rudolph is a 44 y.o. female.    Vitals:  height is 5' 8" (1.727 m) and weight is 125.4 kg (276 lb 9.1 oz). Her tympanic temperature is 97.8 °F (36.6 °C). Her blood pressure is 130/90 (abnormal) and her pulse is 80. Her respiration is 18 and oxygen saturation is 100%.     Chief Complaint: Sinus Problem    Patient is a 42-year-old female who presents for evaluation of cough nasal congestion and chills.  Onset Monday.  States she went on a beach trip over the weekend.  No over-the-counter medications used for symptoms.  Not sure she was had any viral exposure.  Denies fever, chills, dyspnea, wheezing, vomiting, diarrhea or rash.  No other concerns voiced.    Sinus Problem  This is a new problem. The current episode started in the past 7 days. The problem is unchanged. There has been no fever. Her pain is at a severity of 0/10. She is experiencing no pain. Associated symptoms include chills, congestion, coughing and sinus pressure. Pertinent negatives include no diaphoresis, ear pain, headaches, hoarse voice, neck pain, shortness of breath, sneezing, sore throat or swollen glands. Past treatments include nothing. The treatment provided moderate relief.       Constitution: Positive for chills. Negative for sweating and fever.   HENT:  Positive for congestion, postnasal drip and sinus pressure. Negative for ear pain and sore throat.    Neck: Negative for neck pain.   Respiratory:  Positive for cough. Negative for sputum production, shortness of breath, stridor and wheezing.    Gastrointestinal:  Negative for nausea, vomiting and diarrhea.   Skin:  Negative for rash.   Allergic/Immunologic: Negative for sneezing.   Neurological:  Negative for headaches.      Objective:     Physical Exam   Constitutional: She is oriented to person, place, and time. She appears well-developed. She is cooperative.  Non-toxic appearance. She does not appear ill. No distress.   HENT:   Head: Normocephalic " and atraumatic.   Ears:   Right Ear: Hearing, tympanic membrane, external ear and ear canal normal.   Left Ear: Hearing, tympanic membrane, external ear and ear canal normal.   Nose: Congestion present. No mucosal edema, rhinorrhea or nasal deformity. No epistaxis. Right sinus exhibits no maxillary sinus tenderness and no frontal sinus tenderness. Left sinus exhibits no maxillary sinus tenderness and no frontal sinus tenderness.   Mouth/Throat: Uvula is midline, oropharynx is clear and moist and mucous membranes are normal. No trismus in the jaw. Normal dentition. No uvula swelling. No oropharyngeal exudate, posterior oropharyngeal edema or posterior oropharyngeal erythema.   Eyes: Conjunctivae and lids are normal. No scleral icterus.   Neck: Trachea normal and phonation normal. Neck supple. No edema present. No erythema present. No neck rigidity present.   Cardiovascular: Normal rate, regular rhythm, normal heart sounds and normal pulses.   Pulmonary/Chest: Effort normal and breath sounds normal. No respiratory distress. She has no decreased breath sounds. She has no wheezes. She has no rhonchi. She has no rales.   Abdominal: Normal appearance.   Musculoskeletal: Normal range of motion.         General: No deformity. Normal range of motion.   Neurological: She is alert and oriented to person, place, and time. She exhibits normal muscle tone. Coordination normal.   Skin: Skin is warm, dry, intact, not diaphoretic and not pale.   Psychiatric: Her speech is normal and behavior is normal. Judgment and thought content normal.   Nursing note and vitals reviewed.      Assessment:     1. Cough, unspecified type    2. Sinus congestion    3. Smoker    4. Upper respiratory tract infection, unspecified type    5. Elevated BP without diagnosis of hypertension        Plan:       Cough, unspecified type  -     SARS Coronavirus 2 Antigen, POCT Manual Read    Sinus congestion    Smoker    Upper respiratory tract infection,  unspecified type    Elevated BP without diagnosis of hypertension    Other orders  -     benzonatate (TESSALON) 200 MG capsule; Take 1 capsule (200 mg total) by mouth 3 (three) times daily as needed for Cough.  Dispense: 25 capsule; Refill: 0  -     chlorpheniramine-dextromethorp (CORICIDIN HBP COUGH AND COLD) 4-30 mg Tab; Take 1 tablet by mouth every 6 (six) hours while awake. for 10 days  Dispense: 24 each; Refill: 0          Medical Decision Making:   Initial Assessment:   Nontoxic appearing 45 yo female c/o cough and congestion .  After complete evaluation, including thorough history and physical exam, the patient's symptoms are most likely due to viral upper respiratory infection. There are no concerning features on physical exam to suggest bacterial otitis media/externa, sinusitis, strep pharyngitis, or peritonsillar abscess. Vital signs do not suggest sepsis. Lung sounds are clear and not consistent with pneumonia. There is no neck pain or limited ROM to suggest retropharyngeal abscess or meningitis. In clinic testing for covid is negative .The patient will be treated with supportive care. Will provide RX for tessalon upon D/C. Follow up instructions and ED precautions provided.     Clinical Tests:   Lab Tests: Reviewed       <> Summary of Lab: Covid negative     Patient is seen in clinic with no history of essential hypertension noted to be elevated today in clinic.  Patient was counseled that blood pressure was elevated. I advised adherence to a low-salt heart smart diet, exercise and self-monitoring.  Patient follow up with primary physician for further evaluation and possible management if hypertension persists.  Patient is noted to be a current smoker. Evidence shows that smoking causes cancer, heart disease, stroke, lung diseases, diabetes, and chronic obstructive pulmonary disease (COPD), which includes emphysema and chronic bronchitis. Smoking also increases risk for tuberculosis, certain eye  diseases, and problems of the immune system, including rheumatoid arthritis. Furthermore smoking can slow the process of wound healing and prolong the symptoms of respiratory illness.  Patient counseled on dangers of smoking and offered smoking cessation.          Results for orders placed or performed in visit on 08/01/24   SARS Coronavirus 2 Antigen, POCT Manual Read   Result Value Ref Range    SARS Coronavirus 2 Antigen Negative Negative     Acceptable Yes      Patient Instructions   A cold is caused by a virus that can settle in your nose, throat or lungs. This causesa runny or stuffy nose and sneezing. You may also have a sore throat, cough, headache, fever and muscle aches. Different cold viruses last different lengthsof time, but the average time is 2 to 14 days.    Seek immediate medical care if you develop fever, chest pain, or shortness of breath.     Treatment: There is no cure for the common cold, there is only symptomatic care.     Antibiotics may be used to treat signs of a secondary infection, but they do not treat  the cold virus. Try these tips to keep yourself comfortable:                   -Get plenty of rest.                   -Drink plenty of fluids, at least 8 large glasses of fluid a day. Good fluid choices are water, fruit juices high in Vitamin C, tea, gelatin, or broths and soups. These help to keep mucus thin and ease congestion.                  -Use salt water gargle, cough drops or throat sprays to relieve throat pain. Mi ¼ to ½ teaspoon of salt in 1 cup of warm water for a salt water gargle  solution.                  -Use petroleum jelly or lip balm around lips and nose to prevent chapping.                  -Use saline nose drops or spray to help ease congestion.    Over the Counter (OTC) Medicines:  Take over the counter medicines as needed to ease your signs.  Read labels carefully.  Use a product that treats only the signs that you have. Ask your pharmacist  for  recommendations. Be sure to ask about possible interactions with other  medicines you are taking.  Common medicines used to treat signs of a cold include:     -Flonase daily.  -Claritin or Zyrtec daily.  -Mucinex every 12 hours -- Drink plenty of water while taking this medication.    - Cough suppressant, also called antitussive, such as dextromethorphan.  This medicine decreases your reflex and sensitivity to cough. This  medicine may be kept behind the pharmacy counter for purchase.    Cold and cough medicines often contain more than one type of medicine.  Ask the pharmacist for help to confirm that you are not using more than one  product with the same or similar ingredient. For example, some cold and  cough medicines have acetaminophen or ibuprofen in them to help lower a  fever or ease muscle aches. Do not take extra acetaminophen (Tylenol) or  ibuprofen (Advil, Motrin) if the cold or cough medicine has it as an  ingredient. Too much medicine could be harmful.    Take the correct dose as listed on the package. Do not take more than  recommended.    Use a Humidifier:  A cool mist humidifier can make breathing easier by thinning mucus. Do not use  a steam humidifier as hot water can cause burns if spilled.  Place the humidifier a few feet from the bed. Drain and clean each day with  soap and water to prevent bacteria and mold from growing.  Indoor humidity should not be above 50%. Stop using the humidifier if you  notice moisture on windows, walls or pictures.  You do not need to add any medicine to the humidifier.  If you cannot get a humidifier, place a pan of water next to heating vents and  refill the water level daily. The water will evaporate and add moisture to the  Room.    How to prevent the spread of colds  -Wash your hands with soap and water or use alcohol based hand   often. Dry hands wet from washing with soap on a paper towel instead of cloth towel.  -Cough or sneeze into your elbow to  avoid spreading germs.  -Wipe down common surfaces, such as door knobs and faucet handles, with a disinfectant spray.  -Do not share cups or utensils.        Please follow up with your Primary care provider within 2-5 days if your signs and symptoms have not resolved or worsen.      If your condition worsens or fails to improve we recommend that you receive another evaluation at the emergency room immediately or contact your primary medical clinic to discuss your concerns.   You must understand that you have received an Urgent Care treatment only and that you may be released before all of your medical problems are known or treated. You, the patient, will arrange for follow up care as instructed.   Tobacco Cessation  Smoking, vaping, and smokeless tobacco cause harm by raising blood pressure and increasing your risk of heart attack and stroke. Chewing tobacco increases your risk of cancer of the face and throat. Smoking not only raises the risk of cancer, but more often, causes emphysema. This crippling lung disease can cause constant shortness of breath and a need to use oxygen. Stopping smoking can make the difference between playing with your grandchildren outside and sitting by with your oxygen tank watching them.     You may already know your nicotine habit is dangerous, but perhaps you feel helpless or dont know where to start.    Here at [facility] we are invested in the improvement of your health. We would be happy to help you with smoking cessation. If you are interested in quitting and answer yes to   the questions below, we can provide you with FREE assistance to get you on your way.     ? Are you a [place] resident?  ? Did you start smoking before September 1, 1988?  ? Are you ready to quit smoking?    Quitting doesnt have to be lonely -   [facility]s Smoking Cessation program offers a supportive environment along with      FREE smoking cessation counseling to help get you through those rough patches    FREE or reduced medications* to help curb the cravings    You do not need to be an [facility] patient to participate in the program.  Ready? Call [phone] or toll free [phone] or visit [email or web address] to sign up for the program.

## 2024-08-02 ENCOUNTER — TELEPHONE (OUTPATIENT)
Dept: URGENT CARE | Facility: CLINIC | Age: 44
End: 2024-08-02
Payer: MEDICAID

## 2024-08-23 ENCOUNTER — PATIENT MESSAGE (OUTPATIENT)
Dept: OBSTETRICS AND GYNECOLOGY | Facility: CLINIC | Age: 44
End: 2024-08-23
Payer: MEDICAID

## 2024-09-05 ENCOUNTER — OFFICE VISIT (OUTPATIENT)
Dept: OBSTETRICS AND GYNECOLOGY | Facility: CLINIC | Age: 44
End: 2024-09-05
Payer: MEDICAID

## 2024-09-05 VITALS
HEIGHT: 68 IN | BODY MASS INDEX: 42.44 KG/M2 | DIASTOLIC BLOOD PRESSURE: 94 MMHG | WEIGHT: 280 LBS | SYSTOLIC BLOOD PRESSURE: 140 MMHG

## 2024-09-05 DIAGNOSIS — N94.6 DYSMENORRHEA: Primary | ICD-10-CM

## 2024-09-05 PROCEDURE — 99999 PR PBB SHADOW E&M-EST. PATIENT-LVL III: CPT | Mod: PBBFAC,,, | Performed by: OBSTETRICS & GYNECOLOGY

## 2024-09-05 PROCEDURE — 4010F ACE/ARB THERAPY RXD/TAKEN: CPT | Mod: CPTII,,, | Performed by: OBSTETRICS & GYNECOLOGY

## 2024-09-05 PROCEDURE — 3008F BODY MASS INDEX DOCD: CPT | Mod: CPTII,,, | Performed by: OBSTETRICS & GYNECOLOGY

## 2024-09-05 PROCEDURE — 1160F RVW MEDS BY RX/DR IN RCRD: CPT | Mod: CPTII,,, | Performed by: OBSTETRICS & GYNECOLOGY

## 2024-09-05 PROCEDURE — 99213 OFFICE O/P EST LOW 20 MIN: CPT | Mod: S$PBB,,, | Performed by: OBSTETRICS & GYNECOLOGY

## 2024-09-05 PROCEDURE — 1159F MED LIST DOCD IN RCRD: CPT | Mod: CPTII,,, | Performed by: OBSTETRICS & GYNECOLOGY

## 2024-09-05 PROCEDURE — 3077F SYST BP >= 140 MM HG: CPT | Mod: CPTII,,, | Performed by: OBSTETRICS & GYNECOLOGY

## 2024-09-05 PROCEDURE — 99213 OFFICE O/P EST LOW 20 MIN: CPT | Mod: PBBFAC | Performed by: OBSTETRICS & GYNECOLOGY

## 2024-09-05 PROCEDURE — 3080F DIAST BP >= 90 MM HG: CPT | Mod: CPTII,,, | Performed by: OBSTETRICS & GYNECOLOGY

## 2024-09-05 RX ORDER — NORETHINDRONE 0.35 MG/1
1 TABLET ORAL DAILY
Qty: 28 TABLET | Refills: 11 | Status: SHIPPED | OUTPATIENT
Start: 2024-09-05 | End: 2025-09-05

## 2024-09-05 RX ORDER — NAPROXEN 500 MG/1
500 TABLET ORAL 2 TIMES DAILY PRN
Qty: 30 TABLET | Refills: 2 | Status: SHIPPED | OUTPATIENT
Start: 2024-09-05 | End: 2025-09-05

## 2024-09-05 NOTE — PROGRESS NOTES
Subjective     Patient ID: Karen Rudolph is a 44 y.o. female.    Chief Complaint:  Surgery Consult      History of Present Illness  HPI  Dysmenorrhea/ Premenstrual Syndrome  Patient complains of menstrual symptoms. Symptoms began several months ago. Patient describes symptoms of menstrual cramping (severe). Symptoms occur with periods, which are usually 28 days apart and periods lasting 4-5 days. Evaluation to date includes: none. Treatment to date includes: OTC NSAIDs (ineffective). The patient is sexually active (tracking - not trying for pregnancy). History of dyspareunia No.  Pt would like to discuss hysterectomy.    GYN & OB History  No LMP recorded.   Date of Last Pap: 2024    OB History    Para Term  AB Living   1 1 1     1   SAB IAB Ectopic Multiple Live Births           1      # Outcome Date GA Lbr Jose Enrique/2nd Weight Sex Type Anes PTL Lv   1 Term      CS-[id number]          Review of Systems  Review of Systems   Constitutional:  Negative for activity change, appetite change, chills, fatigue, fever and unexpected weight change.   Respiratory:  Negative for shortness of breath.    Cardiovascular:  Negative for chest pain, palpitations and leg swelling.   Gastrointestinal:  Negative for abdominal pain, bloating, blood in stool, constipation, diarrhea, nausea and vomiting.   Genitourinary:  Positive for dysmenorrhea. Negative for dyspareunia, dysuria, flank pain, frequency, genital sores, hematuria, menorrhagia, menstrual problem, pelvic pain, urgency, vaginal bleeding, vaginal discharge, vaginal pain, urinary incontinence, postcoital bleeding, vaginal dryness and vaginal odor.   Musculoskeletal:  Negative for back pain.   Neurological:  Negative for syncope and headaches.          Objective   Physical Exam:   Constitutional: She is oriented to person, place, and time. She appears well-developed and well-nourished. No distress.                           Neurological: She is alert and oriented to  person, place, and time.     Psychiatric: She has a normal mood and affect. Her behavior is normal. Thought content normal.            Assessment and Plan     1. Dysmenorrhea           Plan:  Dysmenorrhea  -     naproxen (NAPROSYN) 500 MG tablet; Take 1 tablet (500 mg total) by mouth 2 (two) times daily as needed (menstrual cramping).  Dispense: 30 tablet; Refill: 2  -     norethindrone (MICRONOR) 0.35 mg tablet; Take 1 tablet (0.35 mg total) by mouth once daily.  Dispense: 28 tablet; Refill: 11  -     US Pelvis Comp with Transvag NON-OB (xpd; Future; Expected date: 09/05/2024      Follow up in about 3 months (around 12/5/2024).

## 2024-09-16 ENCOUNTER — HOSPITAL ENCOUNTER (OUTPATIENT)
Dept: RADIOLOGY | Facility: HOSPITAL | Age: 44
Discharge: HOME | End: 2024-09-16
Attending: OBSTETRICS & GYNECOLOGY
Payer: MEDICAID

## 2024-09-16 DIAGNOSIS — N94.6 DYSMENORRHEA: ICD-10-CM

## 2024-09-16 PROCEDURE — 76856 US EXAM PELVIC COMPLETE: CPT | Mod: 26,,, | Performed by: RADIOLOGY

## 2024-09-16 PROCEDURE — 76856 US EXAM PELVIC COMPLETE: CPT | Mod: TC

## 2024-09-16 PROCEDURE — 76830 TRANSVAGINAL US NON-OB: CPT | Mod: 26,,, | Performed by: RADIOLOGY

## 2024-09-20 ENCOUNTER — PATIENT MESSAGE (OUTPATIENT)
Dept: OBSTETRICS AND GYNECOLOGY | Facility: CLINIC | Age: 44
End: 2024-09-20
Payer: MEDICAID

## 2024-10-14 ENCOUNTER — TELEPHONE (OUTPATIENT)
Dept: SMOKING CESSATION | Facility: CLINIC | Age: 44
End: 2024-10-14
Payer: MEDICAID

## 2024-11-04 NOTE — PROGRESS NOTES
DATE OF INITIAL PHYSICAL THERAPY EVALUATION:             2018      REFERRING PROVIDER:       Tania Clark NP      REFERRING DIAGNOSIS:     Acute bilateral low back pain with left-sided sciatica [M54.42]      ORDERS:   Evaluate and treat as indicated    VISIT    #14      SUBJECTIVE:  Patient states the passive stretching by the therapist provided during her last visit increased her lumbar and LE pain.  She also reports working through the weekend, and having to stand through most of the shift also aggravates her symptoms.    She states she has been taking the anti-inflammatory medication and experiencing short term temporary relief following each dose.  She also complains it makes her sleepy.  She has decided to not begin the gabapentin at this time.    Patient states Dr. Nava discussed a possible referral to Dr. Delvalle for possible injections.    Patients primary complaint(s):  Left LE pain  Lumbar stiffness  Impaired ADL's    History of present condition:  (provided by the patient at the time of the re-evaluation)  Patient reports she was injured in 2018 when she slipped on ice on her driveway and fell landing on her buttocks.  Initially she began to experience lumbar pain with numbness and tingling in the left LE.     Her main complaint is remaining lumbar stiffness with less pain.  Radiating pain into the left LE remains and continues to be aggravated by activity.   She reports her leg pain is located throughout the lateral aspect of the left lower leg from just below the knee to her ankle with tenderness along the distribution of the left IT Band.  Patient reports her leg pain continues to be agravated by standing, bending, and lifting.  Pain increases when first attempting to move about after sitting for an extended period of time.  Pain eases slightly after moving about for a few minutes; however, extended activity increases symptoms.      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      Section 1- Pain intensity    Score 2/5   Section 2- Person care  Score 2/5   Section 3 Lifting- Optional  Score 3/5     Section 4  Walking  Score 1/5  Section 5 Sitting   Score 1/5   Section 6 Standing  Score 1/5  Section 7 Sleeping  Score 2/5   Section 8 Social Life   Score 1/5  Section 9 Traveling  Score 1/5   Section 10 Employ/home  Score 2/5    Patient reports 32% disability on the Modified Oswestry Low Back Pain Disability Questionnaire.  Patient previously reported 54% disability on the Modified Oswestry Low Back Pain Disability Questionnaire.    Past Medical History and co-morbidities:  Past Medical History:   Diagnosis Date    Cholelithiasis      Patient Active Problem List   Diagnosis    Morbid obesity with BMI of 40.0-44.9, adult    Family history of diabetes mellitus    Hidradenitis axillaris    Hyperlipidemia    Iron deficiency anemia    Tobacco use       Current Outpatient Prescriptions:     etodolac (LODINE) 400 MG tablet, Take 1 tablet (400 mg total) by mouth 2 (two) times daily., Disp: 60 tablet, Rfl: 1    gabapentin (NEURONTIN) 300 MG capsule, Take 1 capsule (300 mg total) by mouth every evening., Disp: 30 capsule, Rfl: 1    tiZANidine (ZANAFLEX) 4 MG tablet, Take 1 tablet (4 mg total) by mouth every 12 (twelve) hours as needed., Disp: 30 tablet, Rfl: 0      Previous physical therapy and treatments:  Patient has not participated in a prior physical therapy program to  date for current referring diagnoses.    Occupational/psychosocial/educational profile:  Working as a       OBJECTIVE:    Musculoskeletal Exam:  (from re-evaluation)    Postural Exam  No significant postural deviations noted.  SI exam negative for ilial rotation.    ROM  Lumbar Spine:  Flexion:   75% of full expected motion; end point reported to be stiff in the lumbar region, increases left LE pain.     Extension   50% of full expected motion; end point reported to be stiff in the lumbar region; increases left LE pain.   Right side bending  75% of full expected motion   Left side bending  75% of full expected motion      Flexibility  Limited flexibility noted in the HS, IT Band and piriformis bilaterally.    Functional Strength Testing  No strength deficits or imbalances noted throughout the LE or hip girdle muscle groups.    Palpation  Tenderness to palpation reported along the left IT Band  Tender over left trochanteric bursa  Non tender over painful area in left lower leg region  Lumbar paraspinal muscle guarding present.    Neuromuscular Exam    Gait  Mildly antalgic with weight bearing on the left LE    Transitional Movements  Complains of left leg discomfort and lumbar stiffness when moving from sit to stand, sit to supine, supine to sit.    Sensation  No sensory disturbances noted throughout the LE's  Patient did not complain of paresthesias.  Mild positive SLR left LE      PROBLEM LIST - ASSESSMENT  Patient's lumbar ROM has improved as her complaints of lumbar pain has decreased.  Left LE pain continues to be aggravated by functional activity and is limiting the patient's mobility.  Lumbar traction is providing more lasting relief of her radiating left LE pain than previously.  Muscle stimulation relieves lumbar stiffness.  Patient is benefiting from flexibility exercises for the lumbar, hip girdle, and LE muscle groups.  Patient agrees to proceed with core strengthening once she has been taking the  anti-inflammatory.    Activity Limitations, Participation Restrictions, and CO-MORBIDITIES which may impact the plan of care and potentially impede the patient's progress in therapy include:  none    The patient does not present with any learning or communication barriers which may impact the plan of care and potentially impede the patient's progress in therapy.      CLINICAL PRESENTATION:  Patient's Clinical Presentation is STABLE.      RECOMMENDED PLAN OF CARE:    Continue Pelvic traction to address radiating left LE pain.  Continue muscle stimulation to decrease lumbar paraspinal muscle guarding and stiffness  Continue lumbar flexibility exercises to improve lumbar ROM and stretching exercises for the hip girdle and LE muscle groups  Core stabilization exericses    TREATMENT PROVIDED:       -moist heat and muscle stimulation x 20 mins applied to the lumbar paraspinal muscle groups to decrease MFP and lumbar spine stiffness  -static lumbar traction in supine x 20 mins at 90 lbs (ashlee reports traction decreases her symptoms)    -passive stretching provided by the therapist - deferred since it increased her pain last session  -Lumbar rotational stretches in supine  -Lumbar flexion stretches in supine  -hamstring stretching  -piriformis stretching  -IT Band stretching  -gluteus medius stretching  -stretching of the hip internal rotators    Patient states she is too uncomfortable today to begin the core exercises.    Patient has been instructed in the following exercises.  -Lumbar rotational ROM in supine  -Lumbar rotational stretches in supine  -Lumbar flexion stretches in supine  -hamstring stretching  -piriformis stretching  -IT Band stretching  -gluteus medius stretching  -stretching of the hip internal rotators    PLAN:    Patient to continue out-patient physical therapy to continue the recommended plan of care.    SHORT TERM GOALS:    1. Patient will report pain rating for left LE 3/10 or less  2. Instruct  patient in lumbar flexibility exercises and stretching for the hip girdle and LE muscle groups- goal met  3. Patient to report compliance with daily stretching exercises. - goal met    LONG TERM GOALS:  1. Resolution of left LE radiating pain  2. Full, pain free ROM of the lumbar spine  3. Patient will report functional disability on the Oswestry functional assessment tool of 15% or less        These services are reasonable and necessary for the conditions set forth above while under my care.

## 2024-12-09 ENCOUNTER — PATIENT MESSAGE (OUTPATIENT)
Dept: OBSTETRICS AND GYNECOLOGY | Facility: CLINIC | Age: 44
End: 2024-12-09
Payer: MEDICAID

## 2024-12-10 NOTE — TELEPHONE ENCOUNTER
Spoke to patient and she wanted to know if she could get her appointment changed to a virtual. Informed patient I will route this message to Dr. Bentley to make sure it does not need to be an in person visit.

## 2024-12-12 ENCOUNTER — TELEPHONE (OUTPATIENT)
Dept: OBSTETRICS AND GYNECOLOGY | Facility: CLINIC | Age: 44
End: 2024-12-12
Payer: MEDICAID

## 2024-12-12 NOTE — TELEPHONE ENCOUNTER
Attempted to call patient with no answer, phone number not a working number my chart message sent to inform that Dr Bentley will be out of the office today and will need to reschedule her appointment.  Appointment cancelled.

## 2025-04-29 ENCOUNTER — TELEPHONE (OUTPATIENT)
Dept: SMOKING CESSATION | Facility: CLINIC | Age: 45
End: 2025-04-29
Payer: MEDICAID

## 2025-04-29 NOTE — TELEPHONE ENCOUNTER
Called patient about 12 month follow up. Left message for patient to call [phone]. Resolved quit episode.

## 2025-06-04 DIAGNOSIS — I10 HYPERTENSION, UNSPECIFIED TYPE: ICD-10-CM

## 2025-06-04 DIAGNOSIS — Z00.00 ROUTINE ADULT HEALTH MAINTENANCE: Primary | ICD-10-CM

## 2025-06-05 ENCOUNTER — TELEPHONE (OUTPATIENT)
Dept: INTERNAL MEDICINE | Facility: CLINIC | Age: 45
End: 2025-06-05
Payer: MEDICAID

## 2025-06-25 ENCOUNTER — LAB VISIT (OUTPATIENT)
Dept: LAB | Facility: HOSPITAL | Age: 45
End: 2025-06-25
Attending: FAMILY MEDICINE
Payer: MEDICAID

## 2025-06-25 DIAGNOSIS — Z00.00 ROUTINE ADULT HEALTH MAINTENANCE: ICD-10-CM

## 2025-06-25 DIAGNOSIS — I10 HYPERTENSION, UNSPECIFIED TYPE: ICD-10-CM

## 2025-06-25 LAB
ABSOLUTE EOSINOPHIL (OHS): 0.09 K/UL
ABSOLUTE MONOCYTE (OHS): 0.45 K/UL (ref 0.3–1)
ABSOLUTE NEUTROPHIL COUNT (OHS): 3.51 K/UL (ref 1.8–7.7)
ALBUMIN SERPL BCP-MCNC: 3.7 G/DL (ref 3.5–5.2)
ALP SERPL-CCNC: 68 UNIT/L (ref 40–150)
ALT SERPL W/O P-5'-P-CCNC: 19 UNIT/L (ref 10–44)
ANION GAP (OHS): 11 MMOL/L (ref 8–16)
AST SERPL-CCNC: 20 UNIT/L (ref 11–45)
BASOPHILS # BLD AUTO: 0.04 K/UL
BASOPHILS NFR BLD AUTO: 0.6 %
BILIRUB SERPL-MCNC: 0.6 MG/DL (ref 0.1–1)
BUN SERPL-MCNC: 7 MG/DL (ref 6–20)
CALCIUM SERPL-MCNC: 8.8 MG/DL (ref 8.7–10.5)
CHLORIDE SERPL-SCNC: 107 MMOL/L (ref 95–110)
CHOLEST SERPL-MCNC: 230 MG/DL (ref 120–199)
CHOLEST/HDLC SERPL: 5.3 {RATIO} (ref 2–5)
CO2 SERPL-SCNC: 19 MMOL/L (ref 23–29)
CREAT SERPL-MCNC: 0.8 MG/DL (ref 0.5–1.4)
EAG (OHS): 97 MG/DL (ref 68–131)
ERYTHROCYTE [DISTWIDTH] IN BLOOD BY AUTOMATED COUNT: 14.2 % (ref 11.5–14.5)
GFR SERPLBLD CREATININE-BSD FMLA CKD-EPI: >60 ML/MIN/1.73/M2
GLUCOSE SERPL-MCNC: 83 MG/DL (ref 70–110)
HBA1C MFR BLD: 5 % (ref 4–5.6)
HCT VFR BLD AUTO: 39.7 % (ref 37–48.5)
HDLC SERPL-MCNC: 43 MG/DL (ref 40–75)
HDLC SERPL: 18.7 % (ref 20–50)
HGB BLD-MCNC: 12.2 GM/DL (ref 12–16)
IMM GRANULOCYTES # BLD AUTO: 0.01 K/UL (ref 0–0.04)
IMM GRANULOCYTES NFR BLD AUTO: 0.1 % (ref 0–0.5)
LDLC SERPL CALC-MCNC: 163.6 MG/DL (ref 63–159)
LYMPHOCYTES # BLD AUTO: 2.6 K/UL (ref 1–4.8)
MCH RBC QN AUTO: 27.9 PG (ref 27–31)
MCHC RBC AUTO-ENTMCNC: 30.7 G/DL (ref 32–36)
MCV RBC AUTO: 91 FL (ref 82–98)
NONHDLC SERPL-MCNC: 187 MG/DL
NUCLEATED RBC (/100WBC) (OHS): 0 /100 WBC
PLATELET # BLD AUTO: 398 K/UL (ref 150–450)
PMV BLD AUTO: 9.6 FL (ref 9.2–12.9)
POTASSIUM SERPL-SCNC: 4.2 MMOL/L (ref 3.5–5.1)
PROT SERPL-MCNC: 7.4 GM/DL (ref 6–8.4)
RBC # BLD AUTO: 4.37 M/UL (ref 4–5.4)
RELATIVE EOSINOPHIL (OHS): 1.3 %
RELATIVE LYMPHOCYTE (OHS): 38.8 % (ref 18–48)
RELATIVE MONOCYTE (OHS): 6.7 % (ref 4–15)
RELATIVE NEUTROPHIL (OHS): 52.5 % (ref 38–73)
SODIUM SERPL-SCNC: 137 MMOL/L (ref 136–145)
TRIGL SERPL-MCNC: 117 MG/DL (ref 30–150)
TSH SERPL-ACNC: 0.93 UIU/ML (ref 0.4–4)
WBC # BLD AUTO: 6.7 K/UL (ref 3.9–12.7)

## 2025-06-25 PROCEDURE — 84443 ASSAY THYROID STIM HORMONE: CPT

## 2025-06-25 PROCEDURE — 80061 LIPID PANEL: CPT

## 2025-06-25 PROCEDURE — 85025 COMPLETE CBC W/AUTO DIFF WBC: CPT

## 2025-06-25 PROCEDURE — 36415 COLL VENOUS BLD VENIPUNCTURE: CPT | Mod: PO

## 2025-06-25 PROCEDURE — 83036 HEMOGLOBIN GLYCOSYLATED A1C: CPT

## 2025-06-25 PROCEDURE — 80053 COMPREHEN METABOLIC PANEL: CPT

## 2025-07-02 ENCOUNTER — OFFICE VISIT (OUTPATIENT)
Dept: INTERNAL MEDICINE | Facility: CLINIC | Age: 45
End: 2025-07-02
Payer: MEDICAID

## 2025-07-02 DIAGNOSIS — Z12.31 ENCOUNTER FOR SCREENING MAMMOGRAM FOR MALIGNANT NEOPLASM OF BREAST: ICD-10-CM

## 2025-07-02 DIAGNOSIS — I10 HYPERTENSION, UNSPECIFIED TYPE: Primary | ICD-10-CM

## 2025-07-02 DIAGNOSIS — E78.5 HYPERLIPIDEMIA, UNSPECIFIED HYPERLIPIDEMIA TYPE: ICD-10-CM

## 2025-07-02 PROCEDURE — 3074F SYST BP LT 130 MM HG: CPT | Mod: CPTII,,, | Performed by: FAMILY MEDICINE

## 2025-07-02 PROCEDURE — 4010F ACE/ARB THERAPY RXD/TAKEN: CPT | Mod: CPTII,,, | Performed by: FAMILY MEDICINE

## 2025-07-02 PROCEDURE — 99999 PR PBB SHADOW E&M-EST. PATIENT-LVL IV: CPT | Mod: PBBFAC,,, | Performed by: FAMILY MEDICINE

## 2025-07-02 PROCEDURE — 3078F DIAST BP <80 MM HG: CPT | Mod: CPTII,,, | Performed by: FAMILY MEDICINE

## 2025-07-02 PROCEDURE — 99214 OFFICE O/P EST MOD 30 MIN: CPT | Mod: PBBFAC,PO | Performed by: FAMILY MEDICINE

## 2025-07-02 PROCEDURE — 3008F BODY MASS INDEX DOCD: CPT | Mod: CPTII,,, | Performed by: FAMILY MEDICINE

## 2025-07-02 PROCEDURE — 1159F MED LIST DOCD IN RCRD: CPT | Mod: CPTII,,, | Performed by: FAMILY MEDICINE

## 2025-07-02 PROCEDURE — G2211 COMPLEX E/M VISIT ADD ON: HCPCS | Mod: ,,, | Performed by: FAMILY MEDICINE

## 2025-07-02 PROCEDURE — 3044F HG A1C LEVEL LT 7.0%: CPT | Mod: CPTII,,, | Performed by: FAMILY MEDICINE

## 2025-07-02 PROCEDURE — 99214 OFFICE O/P EST MOD 30 MIN: CPT | Mod: S$PBB,,, | Performed by: FAMILY MEDICINE

## 2025-07-02 RX ORDER — BUPROPION HYDROCHLORIDE 150 MG/1
150 TABLET ORAL DAILY
Qty: 30 TABLET | Refills: 3 | Status: SHIPPED | OUTPATIENT
Start: 2025-07-02 | End: 2026-07-02

## 2025-07-02 RX ORDER — PRAVASTATIN SODIUM 20 MG/1
20 TABLET ORAL DAILY
Qty: 30 TABLET | Refills: 3 | Status: SHIPPED | OUTPATIENT
Start: 2025-07-02 | End: 2026-07-02

## 2025-07-02 RX ORDER — LISINOPRIL 40 MG/1
40 TABLET ORAL DAILY
Qty: 30 TABLET | Refills: 5 | Status: SHIPPED | OUTPATIENT
Start: 2025-07-02

## 2025-07-03 VITALS
HEIGHT: 68 IN | WEIGHT: 286.38 LBS | BODY MASS INDEX: 43.4 KG/M2 | DIASTOLIC BLOOD PRESSURE: 70 MMHG | HEART RATE: 59 BPM | OXYGEN SATURATION: 99 % | SYSTOLIC BLOOD PRESSURE: 128 MMHG | TEMPERATURE: 98 F

## 2025-07-03 NOTE — PROGRESS NOTES
Subjective:      Patient ID: Karen Rudolph is a 44 y.o. female.    Chief Complaint: Annual Exam      History of Present Illness    CHIEF COMPLAINT:  Ms. Rudolph presents today for follow up    LABORATORY RESULTS:  CBC was normal with no evidence of anemia, normal WBC, platelets, and MCHC. Cholesterol has increased. A1C and thyroid function were within normal range.    MEDICATIONS:  She discontinued Crestor due to almost daily leg cramps. She is no longer taking birth control pills due to side effects and ineffectiveness. Lexapro has not been refilled since August of last year. She previously took Wellbutrin in [id number] prescribed by Dr. Ng, with no reported specific issues for discontinuation.    MENSTRUAL HISTORY:  She experiences debilitating menstrual cramps during the first three days of her cycle, managing pain with ibuprofen 800mg every 4 hours. Imaging was normal, showing no evidence of fibroids or uterine lining thickening. Imaging confirmed previous  with associated scarring.    MENTAL HEALTH:  She reports significant stress due to current living situation with her mother and brother's family. Her brother's persistent depression and negative attitude impacts her mental well-being. She feels overwhelmed by household dynamics, leading to self-isolation in her room and reliance on psychiatric medications to cope with environmental stressors.        Past Medical History:   Diagnosis Date    Cholelithiasis     Chronic back pain     Depression           Past Surgical History:   Procedure Laterality Date     SECTION      COLPOSCOPY       Family History   Problem Relation Name Age of Onset    Diabetes Mother      Hypertension Mother       Social History[1]  Review of patient's allergies indicates:  No Known Allergies    Review of Systems   Constitutional:  Positive for fever. Negative for chills and malaise/fatigue.   HENT:  Negative for congestion.    Eyes:  Positive for blurred vision.  "  Respiratory:  Negative for cough and shortness of breath.    Cardiovascular:  Negative for chest pain and palpitations.   Gastrointestinal:  Negative for abdominal pain and constipation.   Musculoskeletal:  Positive for myalgias.   Skin:  Negative for rash.   Psychiatric/Behavioral:  Positive for depression. The patient is nervous/anxious and has insomnia.      Objective:       /70 Comment: home reading  Pulse (!) 59   Temp 97.7 °F (36.5 °C) (Tympanic)   Ht 5' 8" (1.727 m)   Wt 129.9 kg (286 lb 6 oz)   LMP 06/10/2025 (Exact Date)   SpO2 99%   BMI 43.54 kg/m²   Physical Exam             Physical Exam  Vitals reviewed.   Constitutional:       General: She is not in acute distress.     Appearance: Normal appearance. She is well-developed. She is not ill-appearing or diaphoretic.   HENT:      Head: Normocephalic and atraumatic.      Right Ear: Hearing, tympanic membrane, ear canal and external ear normal.      Left Ear: Hearing, tympanic membrane, ear canal and external ear normal.      Nose: Nose normal.      Mouth/Throat:      Pharynx: Uvula midline. No oropharyngeal exudate.   Eyes:      Conjunctiva/sclera: Conjunctivae normal.      Pupils: Pupils are equal, round, and reactive to light.   Neck:      Thyroid: No thyromegaly.      Trachea: No tracheal deviation.   Cardiovascular:      Rate and Rhythm: Normal rate and regular rhythm.      Heart sounds: Normal heart sounds. No murmur heard.  Pulmonary:      Effort: Pulmonary effort is normal. No respiratory distress.      Breath sounds: Normal breath sounds.   Abdominal:      General: Bowel sounds are normal.      Palpations: Abdomen is soft.      Tenderness: There is no abdominal tenderness. There is no guarding.      Hernia: No hernia is present.   Musculoskeletal:         General: Normal range of motion.      Cervical back: Normal range of motion and neck supple.   Lymphadenopathy:      Cervical: No cervical adenopathy.   Skin:     General: Skin is " warm and dry.      Capillary Refill: Capillary refill takes less than 2 seconds.   Neurological:      General: No focal deficit present.      Mental Status: She is alert and oriented to person, place, and time.   Psychiatric:         Mood and Affect: Mood normal.         Behavior: Behavior normal.         Thought Content: Thought content normal.         Judgment: Judgment normal.         Assessment:     1. Hypertension, unspecified type    2. Hyperlipidemia, unspecified hyperlipidemia type    3. Encounter for screening mammogram for malignant neoplasm of breast      Plan:   Assessment & Plan    MEDICAL DECISION MAKING:  - Reviewed CBC results, noting all parameters WNL including hemoglobin and WBC counts, with prior anemia now resolved.  - Identified elevated cholesterol levels, particularly LDL, requiring medication intervention.  - Initiated Pravastatin as an alternative statin, with potential for better tolerability due to different metabolism.  - Developed stepped approach for cholesterol management: Pravastatin first, then Zetia (ezetimibe) if needed, followed by injectable options like Repatha as a last resort.  - A1C WNL, indicating good glycemic control.  - Thyroid function normal.  - Gynecological US results normal, noting absence of fibroids or endometrial thickening.  - Initiated Wellbutrin (bupropion) daily for mood management and potential off-label benefits for smoking cessation and weight management.  - Clarified the safety of using ibuprofen 800 mg every 4 hours as needed for menstrual cramps, given normal renal function and absence of interacting medications, to be taken with food.    PATIENT EDUCATION:  - Explained cholesterol test results, emphasizing the significance of elevated LDL levels and target ranges for different risk categories.  - Provided information on mammogram procedure:  - Described the process as quick (approximately 6 minutes)  - Explained that modern machines are less  uncomfortable than older versions  - Emphasized the importance of early detection for breast cancer.    ACTION ITEMS/LIFESTYLE:  - Discussed dietary strategies to lower cholesterol, including:  - Increasing fiber intake  - Consuming nuts like walnuts, pecans, cashews, and almonds  - Choosing lean proteins such as chicken, turkey, and seafood over red meat  - Reading nutrition labels to identify and limit saturated and trans fats.  - Ms. Rudolph to continue current exercise regimen.    MEDICATIONS:  - Initiated Pravastatin as an alternative statin, with potential for better tolerability due to different metabolism.  - Initiated Wellbutrin (bupropion) daily for mood management and potential off-label benefits for smoking cessation and weight management.  - Clarified the safety of using ibuprofen 800 mg every 4 hours as needed for menstrual cramps, given normal renal function and absence of interacting medications, to be taken with food.    ORDERS:  - Ordered cholesterol and metabolic panel recheck in 3 months.  - Ordered mammogram for first-time screening.    REFERRALS:  - Consider rescheduling appointment with Dr. Bentley for ongoing GYN issues.        Hypertension, unspecified type    Hyperlipidemia, unspecified hyperlipidemia type  -     Mammo Digital Screening Bilat w/ Alfonso (XPD); Future; Expected date: 07/02/2025  -     Lipid Panel; Future; Expected date: 09/30/2025  -     Comprehensive Metabolic Panel; Future; Expected date: 09/30/2025    Encounter for screening mammogram for malignant neoplasm of breast  -     Mammo Digital Screening Bilat w/ Alfonso (XPD); Future; Expected date: 07/02/2025    Other orders  -     pravastatin (PRAVACHOL) 20 MG tablet; Take 1 tablet (20 mg total) by mouth once daily.  Dispense: 30 tablet; Refill: 3  -     lisinopriL (PRINIVIL,ZESTRIL) 40 MG tablet; Take 1 tablet (40 mg total) by mouth once daily.  Dispense: 30 tablet; Refill: 5  -     buPROPion (WELLBUTRIN XL) 150 MG TB24 tablet; Take  1 tablet (150 mg total) by mouth once daily.  Dispense: 30 tablet; Refill: 3      Medication List with Changes/Refills   New Medications    BUPROPION (WELLBUTRIN XL) 150 MG TB24 TABLET    Take 1 tablet (150 mg total) by mouth once daily.    PRAVASTATIN (PRAVACHOL) 20 MG TABLET    Take 1 tablet (20 mg total) by mouth once daily.   Current Medications    ALPRAZOLAM (XANAX) 0.5 MG TABLET    Take 1 tablet (0.5 mg total) by mouth 2 (two) times daily as needed for Anxiety.    ESCITALOPRAM OXALATE (LEXAPRO) 20 MG TABLET    Take 1 tablet (20 mg total) by mouth once daily.    NAPROXEN (NAPROSYN) 500 MG TABLET    Take 1 tablet (500 mg total) by mouth 2 (two) times daily as needed (menstrual cramping).    NICOTINE (NICODERM CQ) 21 MG/24 HR    Place 1 patch onto the skin once daily.    NORETHINDRONE (MICRONOR) 0.35 MG TABLET    Take 1 tablet (0.35 mg total) by mouth once daily.   Changed and/or Refilled Medications    Modified Medication Previous Medication    LISINOPRIL (PRINIVIL,ZESTRIL) 40 MG TABLET lisinopriL (PRINIVIL,ZESTRIL) 40 MG tablet       Take 1 tablet (40 mg total) by mouth once daily.    Take 1 tablet (40 mg total) by mouth once daily.   Discontinued Medications    ROSUVASTATIN (CRESTOR) 10 MG TABLET    Take 1 tablet (10 mg total) by mouth once daily.       This note was generated with the assistance of ambient listening technology. Verbal consent was obtained by the patient and accompanying visitor(s) for the recording of patient appointment to facilitate this note. I attest to having reviewed and edited the generated note for accuracy, though some syntax or spelling errors may persist. Please contact the author of this note for any clarification.            [1]   Social History  Socioeconomic History    Marital status: Single   Tobacco Use    Smoking status: Every Day     Types: Cigarettes    Smokeless tobacco: Current   Substance and Sexual Activity    Alcohol use: Yes     Alcohol/week: 0.0 standard drinks of  alcohol     Comment: weekly     Drug use: No    Sexual activity: Yes     Partners: Male     Birth control/protection: None     Social Drivers of Health     Financial Resource Strain: Low Risk  (6/30/2025)    Overall Financial Resource Strain (CARDIA)     Difficulty of Paying Living Expenses: Not very hard   Food Insecurity: No Food Insecurity (6/30/2025)    Hunger Vital Sign     Worried About Running Out of Food in the Last Year: Never true     Ran Out of Food in the Last Year: Never true   Transportation Needs: No Transportation Needs (6/30/2025)    PRAPARE - Transportation     Lack of Transportation (Medical): No     Lack of Transportation (Non-Medical): No   Physical Activity: Unknown (6/30/2025)    Exercise Vital Sign     Days of Exercise per Week: 3 days   Stress: Stress Concern Present (6/30/2025)    [employer] - Occupational Stress Questionnaire     Feeling of Stress : Very much   Housing Stability: High Risk (6/30/2025)    Housing Stability Vital Sign     Unable to Pay for Housing in the Last Year: No     Number of Times Moved in the Last Year: 3     Homeless in the Last Year: No

## 2025-07-22 ENCOUNTER — PATIENT MESSAGE (OUTPATIENT)
Dept: INTERNAL MEDICINE | Facility: CLINIC | Age: 45
End: 2025-07-22
Payer: MEDICAID

## 2025-07-23 ENCOUNTER — PATIENT MESSAGE (OUTPATIENT)
Dept: INTERNAL MEDICINE | Facility: CLINIC | Age: 45
End: 2025-07-23

## 2025-07-23 ENCOUNTER — HOSPITAL ENCOUNTER (OUTPATIENT)
Dept: RADIOLOGY | Facility: HOSPITAL | Age: 45
Discharge: HOME | End: 2025-07-23
Attending: FAMILY MEDICINE
Payer: MEDICAID

## 2025-07-23 ENCOUNTER — OFFICE VISIT (OUTPATIENT)
Dept: INTERNAL MEDICINE | Facility: CLINIC | Age: 45
End: 2025-07-23
Payer: MEDICAID

## 2025-07-23 VITALS
HEIGHT: 68 IN | OXYGEN SATURATION: 100 % | BODY MASS INDEX: 43.84 KG/M2 | DIASTOLIC BLOOD PRESSURE: 88 MMHG | HEART RATE: 90 BPM | WEIGHT: 289.25 LBS | TEMPERATURE: 98 F | SYSTOLIC BLOOD PRESSURE: 136 MMHG

## 2025-07-23 DIAGNOSIS — J06.9 UPPER RESPIRATORY TRACT INFECTION, UNSPECIFIED TYPE: Primary | ICD-10-CM

## 2025-07-23 DIAGNOSIS — Z12.31 ENCOUNTER FOR SCREENING MAMMOGRAM FOR MALIGNANT NEOPLASM OF BREAST: ICD-10-CM

## 2025-07-23 DIAGNOSIS — E78.5 HYPERLIPIDEMIA, UNSPECIFIED HYPERLIPIDEMIA TYPE: ICD-10-CM

## 2025-07-23 LAB
CTP QC/QA: YES
CTP QC/QA: YES
POC MOLECULAR INFLUENZA A AGN: NEGATIVE
POC MOLECULAR INFLUENZA B AGN: NEGATIVE
SARS-COV-2 RDRP RESP QL NAA+PROBE: NEGATIVE

## 2025-07-23 PROCEDURE — 99999 PR PBB SHADOW E&M-EST. PATIENT-LVL IV: CPT | Mod: PBBFAC,,,

## 2025-07-23 PROCEDURE — 99999PBSHW POCT INFLUENZA A/B MOLECULAR: Mod: PBBFAC,,,

## 2025-07-23 PROCEDURE — 99213 OFFICE O/P EST LOW 20 MIN: CPT | Mod: S$PBB,,,

## 2025-07-23 PROCEDURE — 87502 INFLUENZA DNA AMP PROBE: CPT | Mod: PBBFAC,PO

## 2025-07-23 PROCEDURE — 77063 BREAST TOMOSYNTHESIS BI: CPT | Mod: 26,,, | Performed by: RADIOLOGY

## 2025-07-23 PROCEDURE — 3075F SYST BP GE 130 - 139MM HG: CPT | Mod: CPTII,,,

## 2025-07-23 PROCEDURE — 3008F BODY MASS INDEX DOCD: CPT | Mod: CPTII,,,

## 2025-07-23 PROCEDURE — 1159F MED LIST DOCD IN RCRD: CPT | Mod: CPTII,,,

## 2025-07-23 PROCEDURE — 87635 SARS-COV-2 COVID-19 AMP PRB: CPT | Mod: PBBFAC,PO

## 2025-07-23 PROCEDURE — 3079F DIAST BP 80-89 MM HG: CPT | Mod: CPTII,,,

## 2025-07-23 PROCEDURE — 3044F HG A1C LEVEL LT 7.0%: CPT | Mod: CPTII,,,

## 2025-07-23 PROCEDURE — 77063 BREAST TOMOSYNTHESIS BI: CPT | Mod: TC

## 2025-07-23 PROCEDURE — 4010F ACE/ARB THERAPY RXD/TAKEN: CPT | Mod: CPTII,,,

## 2025-07-23 PROCEDURE — 99999PBSHW: Mod: PBBFAC,,,

## 2025-07-23 PROCEDURE — 99214 OFFICE O/P EST MOD 30 MIN: CPT | Mod: PBBFAC,PO

## 2025-07-23 PROCEDURE — 77067 SCR MAMMO BI INCL CAD: CPT | Mod: 26,,, | Performed by: RADIOLOGY

## 2025-07-23 RX ORDER — METHYLPREDNISOLONE 4 MG/1
TABLET ORAL
Qty: 21 EACH | Refills: 0 | Status: SHIPPED | OUTPATIENT
Start: 2025-07-23 | End: 2025-08-13

## 2025-07-24 ENCOUNTER — RESULTS FOLLOW-UP (OUTPATIENT)
Dept: INTERNAL MEDICINE | Facility: CLINIC | Age: 45
End: 2025-07-24
Payer: MEDICAID

## 2025-08-14 ENCOUNTER — OFFICE VISIT (OUTPATIENT)
Dept: URGENT CARE | Facility: CLINIC | Age: 45
End: 2025-08-14
Payer: MEDICAID

## 2025-08-14 VITALS
HEIGHT: 69 IN | TEMPERATURE: 99 F | SYSTOLIC BLOOD PRESSURE: 140 MMHG | HEART RATE: 77 BPM | BODY MASS INDEX: 41.99 KG/M2 | RESPIRATION RATE: 20 BRPM | DIASTOLIC BLOOD PRESSURE: 83 MMHG | OXYGEN SATURATION: 100 % | WEIGHT: 283.5 LBS

## 2025-08-14 DIAGNOSIS — F17.200 SMOKER: ICD-10-CM

## 2025-08-14 DIAGNOSIS — I10 ELEVATED BLOOD PRESSURE READING IN OFFICE WITH DIAGNOSIS OF HYPERTENSION: ICD-10-CM

## 2025-08-14 DIAGNOSIS — R59.9 LYMPH NODE ENLARGEMENT: Primary | ICD-10-CM

## 2025-08-14 PROBLEM — F41.9 ANXIETY DISORDER, UNSPECIFIED: Status: ACTIVE | Noted: 2020-09-04

## 2025-08-14 PROCEDURE — 99214 OFFICE O/P EST MOD 30 MIN: CPT | Mod: S$GLB,,, | Performed by: NURSE PRACTITIONER

## 2025-08-19 ENCOUNTER — OFFICE VISIT (OUTPATIENT)
Dept: INTERNAL MEDICINE | Facility: CLINIC | Age: 45
End: 2025-08-19
Payer: MEDICAID

## 2025-08-19 VITALS
OXYGEN SATURATION: 100 % | HEART RATE: 90 BPM | WEIGHT: 285.69 LBS | HEIGHT: 68 IN | TEMPERATURE: 97 F | DIASTOLIC BLOOD PRESSURE: 84 MMHG | BODY MASS INDEX: 43.3 KG/M2 | SYSTOLIC BLOOD PRESSURE: 130 MMHG

## 2025-08-19 DIAGNOSIS — I10 HYPERTENSION, UNSPECIFIED TYPE: ICD-10-CM

## 2025-08-19 DIAGNOSIS — R59.0 LYMPHADENOPATHY, AXILLARY: Primary | ICD-10-CM

## 2025-08-19 PROCEDURE — 99213 OFFICE O/P EST LOW 20 MIN: CPT | Mod: S$PBB,,, | Performed by: FAMILY MEDICINE

## 2025-08-19 PROCEDURE — 3044F HG A1C LEVEL LT 7.0%: CPT | Mod: CPTII,,, | Performed by: FAMILY MEDICINE

## 2025-08-19 PROCEDURE — 3008F BODY MASS INDEX DOCD: CPT | Mod: CPTII,,, | Performed by: FAMILY MEDICINE

## 2025-08-19 PROCEDURE — 3079F DIAST BP 80-89 MM HG: CPT | Mod: CPTII,,, | Performed by: FAMILY MEDICINE

## 2025-08-19 PROCEDURE — 4010F ACE/ARB THERAPY RXD/TAKEN: CPT | Mod: CPTII,,, | Performed by: FAMILY MEDICINE

## 2025-08-19 PROCEDURE — 1159F MED LIST DOCD IN RCRD: CPT | Mod: CPTII,,, | Performed by: FAMILY MEDICINE

## 2025-08-19 PROCEDURE — 99999 PR PBB SHADOW E&M-EST. PATIENT-LVL IV: CPT | Mod: PBBFAC,,, | Performed by: FAMILY MEDICINE

## 2025-08-19 PROCEDURE — 99214 OFFICE O/P EST MOD 30 MIN: CPT | Mod: PBBFAC,PO | Performed by: FAMILY MEDICINE

## 2025-08-19 PROCEDURE — 3075F SYST BP GE 130 - 139MM HG: CPT | Mod: CPTII,,, | Performed by: FAMILY MEDICINE

## 2025-09-02 ENCOUNTER — HOSPITAL ENCOUNTER (EMERGENCY)
Facility: HOSPITAL | Age: 45
Discharge: HOME | End: 2025-09-02
Attending: EMERGENCY MEDICINE
Payer: MEDICAID

## 2025-09-02 VITALS
HEART RATE: 96 BPM | DIASTOLIC BLOOD PRESSURE: 119 MMHG | SYSTOLIC BLOOD PRESSURE: 176 MMHG | WEIGHT: 293 LBS | BODY MASS INDEX: 43.4 KG/M2 | RESPIRATION RATE: 18 BRPM | TEMPERATURE: 98 F | OXYGEN SATURATION: 100 % | HEIGHT: 69 IN

## 2025-09-02 DIAGNOSIS — M54.41 ACUTE RIGHT-SIDED LOW BACK PAIN WITH RIGHT-SIDED SCIATICA: ICD-10-CM

## 2025-09-02 DIAGNOSIS — M54.31 SCIATICA OF RIGHT SIDE: Primary | ICD-10-CM

## 2025-09-02 LAB — B-HCG UR QL: NEGATIVE

## 2025-09-02 PROCEDURE — 99284 EMERGENCY DEPT VISIT MOD MDM: CPT | Mod: 25

## 2025-09-02 PROCEDURE — 96372 THER/PROPH/DIAG INJ SC/IM: CPT | Performed by: NURSE PRACTITIONER

## 2025-09-02 PROCEDURE — 81025 URINE PREGNANCY TEST: CPT | Performed by: NURSE PRACTITIONER

## 2025-09-02 PROCEDURE — 63600175 PHARM REV CODE 636 W HCPCS: Performed by: NURSE PRACTITIONER

## 2025-09-02 RX ORDER — ORPHENADRINE CITRATE 30 MG/ML
60 INJECTION INTRAMUSCULAR; INTRAVENOUS
Status: COMPLETED | OUTPATIENT
Start: 2025-09-02 | End: 2025-09-02

## 2025-09-02 RX ORDER — KETOROLAC TROMETHAMINE 30 MG/ML
60 INJECTION, SOLUTION INTRAMUSCULAR; INTRAVENOUS
Status: COMPLETED | OUTPATIENT
Start: 2025-09-02 | End: 2025-09-02

## 2025-09-02 RX ORDER — HYDROCODONE BITARTRATE AND ACETAMINOPHEN 5; 325 MG/1; MG/1
1 TABLET ORAL EVERY 6 HOURS PRN
Qty: 12 TABLET | Refills: 0 | Status: SHIPPED | OUTPATIENT
Start: 2025-09-02

## 2025-09-02 RX ORDER — METHOCARBAMOL 500 MG/1
1000 TABLET, FILM COATED ORAL 2 TIMES DAILY PRN
Qty: 28 TABLET | Refills: 0 | Status: SHIPPED | OUTPATIENT
Start: 2025-09-02 | End: 2025-09-09

## 2025-09-02 RX ADMIN — ORPHENADRINE CITRATE 60 MG: 60 INJECTION INTRAMUSCULAR; INTRAVENOUS at 12:09

## 2025-09-02 RX ADMIN — KETOROLAC TROMETHAMINE 60 MG: 30 INJECTION, SOLUTION INTRAMUSCULAR at 01:09
